# Patient Record
Sex: FEMALE | Race: BLACK OR AFRICAN AMERICAN | NOT HISPANIC OR LATINO | Employment: OTHER | URBAN - METROPOLITAN AREA
[De-identification: names, ages, dates, MRNs, and addresses within clinical notes are randomized per-mention and may not be internally consistent; named-entity substitution may affect disease eponyms.]

---

## 2017-01-05 RX ORDER — ZOLPIDEM TARTRATE 5 MG/1
TABLET ORAL
Qty: 21 TABLET | Refills: 0 | Status: SHIPPED | OUTPATIENT
Start: 2017-01-05 | End: 2017-05-15 | Stop reason: SDUPTHER

## 2017-01-16 ENCOUNTER — OFFICE VISIT (OUTPATIENT)
Dept: INTERNAL MEDICINE | Facility: CLINIC | Age: 62
End: 2017-01-16
Payer: COMMERCIAL

## 2017-01-16 VITALS
SYSTOLIC BLOOD PRESSURE: 132 MMHG | BODY MASS INDEX: 27.27 KG/M2 | WEIGHT: 153.88 LBS | TEMPERATURE: 96 F | DIASTOLIC BLOOD PRESSURE: 78 MMHG | HEART RATE: 69 BPM | HEIGHT: 63 IN

## 2017-01-16 DIAGNOSIS — I10 HTN (HYPERTENSION), BENIGN: ICD-10-CM

## 2017-01-16 DIAGNOSIS — L03.011 PARONYCHIA, RIGHT: ICD-10-CM

## 2017-01-16 DIAGNOSIS — M81.0 OSTEOPOROSIS: Primary | ICD-10-CM

## 2017-01-16 DIAGNOSIS — D64.9 ANEMIA, UNSPECIFIED TYPE: ICD-10-CM

## 2017-01-16 PROCEDURE — 3075F SYST BP GE 130 - 139MM HG: CPT | Mod: S$GLB,,, | Performed by: FAMILY MEDICINE

## 2017-01-16 PROCEDURE — 99999 PR PBB SHADOW E&M-EST. PATIENT-LVL II: CPT | Mod: PBBFAC,,, | Performed by: FAMILY MEDICINE

## 2017-01-16 PROCEDURE — 99214 OFFICE O/P EST MOD 30 MIN: CPT | Mod: S$GLB,,, | Performed by: FAMILY MEDICINE

## 2017-01-16 PROCEDURE — 1159F MED LIST DOCD IN RCRD: CPT | Mod: S$GLB,,, | Performed by: FAMILY MEDICINE

## 2017-01-16 PROCEDURE — 3078F DIAST BP <80 MM HG: CPT | Mod: S$GLB,,, | Performed by: FAMILY MEDICINE

## 2017-01-16 RX ORDER — CLINDAMYCIN HYDROCHLORIDE 150 MG/1
150 CAPSULE ORAL 3 TIMES DAILY
Qty: 21 CAPSULE | Refills: 0 | Status: SHIPPED | OUTPATIENT
Start: 2017-01-16 | End: 2017-01-23

## 2017-01-16 RX ORDER — ALENDRONATE SODIUM 70 MG/1
TABLET ORAL
Qty: 12 TABLET | Refills: 3 | Status: SHIPPED | OUTPATIENT
Start: 2017-01-16 | End: 2018-02-06 | Stop reason: SDUPTHER

## 2017-01-16 NOTE — PROGRESS NOTES
Subjective:       Patient ID: Kacy Blackwell is a 61 y.o. female.    Chief Complaint: Multiple issues see below    HPIr 1st toe nicked while cutting nails and swelling pain since. No fever  osteopor d/wd resuming fosmax. She remembers dosing /side eff. No intol  intermitt mild anemia    Some stress taking care 95 y/o mom    ,.  Past Medical History   Diagnosis Date    Chronic cough      controlled w flonase/zyrtec    Hyperaldosteronism      dr cummings    Hyperlipidemia     Hypertension     Hypokalemia     Osteoporosis 12/16 naeem 12/18    Seasonal allergies      Past Surgical History   Procedure Laterality Date    Hysterectomy  1980     cervix absent    Cyst removed from head       Family History   Problem Relation Age of Onset    Hypertension Mother     Arthritis Mother     Diabetes Mother     Heart failure Mother     Hypertension Father     Arthritis Father     Cancer Sister     Hypertension Brother     Diabetes Brother     Stroke Brother     Hypertension Sister     Breast cancer Neg Hx     Colon cancer Neg Hx     Ovarian cancer Neg Hx      Social History     Social History    Marital status:      Spouse name: N/A    Number of children: N/A    Years of education: N/A     Social History Main Topics    Smoking status: Never Smoker    Smokeless tobacco: Never Used    Alcohol use Yes      Comment: rare social drinker    Drug use: No    Sexual activity: Yes     Partners: Male     Other Topics Concern    None     Social History Narrative    . 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    Pulmonary/Chest: Effort normal and breath sounds normal.     no carot bruit    r 1st toe eith mild swelling tend paronych  area  Assessment:       1. Osteoporosis    2. HTN (hypertension), benign    3. Anemia, unspecified type      paronychia   Plan:       **If no better 3-4 days followup sooner if any worsening or change in symptoms or lack of resolution  Has [facility] visit May  *  fosmax rx  Declines flu shot

## 2017-03-06 RX ORDER — TRIAMTERENE 100 MG/1
CAPSULE ORAL
Qty: 30 CAPSULE | Refills: 7 | OUTPATIENT
Start: 2017-03-06

## 2017-03-07 RX ORDER — TRIAMTERENE 100 MG/1
CAPSULE ORAL
Qty: 30 CAPSULE | Refills: 7 | OUTPATIENT
Start: 2017-03-07

## 2017-03-09 RX ORDER — TRIAMTERENE 100 MG/1
CAPSULE ORAL
Qty: 30 CAPSULE | Refills: 7 | OUTPATIENT
Start: 2017-03-09

## 2017-03-14 ENCOUNTER — PATIENT MESSAGE (OUTPATIENT)
Dept: NEPHROLOGY | Facility: CLINIC | Age: 62
End: 2017-03-14

## 2017-03-14 ENCOUNTER — PATIENT MESSAGE (OUTPATIENT)
Dept: INTERNAL MEDICINE | Facility: CLINIC | Age: 62
End: 2017-03-14

## 2017-03-14 RX ORDER — TRIAMTERENE CAPSULES 100 MG/1
100 CAPSULE ORAL DAILY
Qty: 30 CAPSULE | Refills: 11 | OUTPATIENT
Start: 2017-03-14

## 2017-03-15 ENCOUNTER — PATIENT MESSAGE (OUTPATIENT)
Dept: NEPHROLOGY | Facility: CLINIC | Age: 62
End: 2017-03-15

## 2017-03-15 DIAGNOSIS — I10 ESSENTIAL HYPERTENSION: Primary | ICD-10-CM

## 2017-03-16 ENCOUNTER — PATIENT MESSAGE (OUTPATIENT)
Dept: NEPHROLOGY | Facility: CLINIC | Age: 62
End: 2017-03-16

## 2017-03-16 RX ORDER — TRIAMTERENE CAPSULES 100 MG/1
100 CAPSULE ORAL DAILY
Qty: 30 CAPSULE | Refills: 8 | OUTPATIENT
Start: 2017-03-16

## 2017-03-20 ENCOUNTER — LAB VISIT (OUTPATIENT)
Dept: LAB | Facility: HOSPITAL | Age: 62
End: 2017-03-20
Attending: INTERNAL MEDICINE
Payer: COMMERCIAL

## 2017-03-20 ENCOUNTER — OFFICE VISIT (OUTPATIENT)
Dept: NEPHROLOGY | Facility: CLINIC | Age: 62
End: 2017-03-20
Payer: COMMERCIAL

## 2017-03-20 VITALS
WEIGHT: 158.75 LBS | DIASTOLIC BLOOD PRESSURE: 86 MMHG | HEIGHT: 63 IN | SYSTOLIC BLOOD PRESSURE: 146 MMHG | HEART RATE: 64 BPM | BODY MASS INDEX: 28.13 KG/M2

## 2017-03-20 DIAGNOSIS — I10 HTN (HYPERTENSION), BENIGN: Primary | ICD-10-CM

## 2017-03-20 DIAGNOSIS — I10 ESSENTIAL HYPERTENSION: ICD-10-CM

## 2017-03-20 LAB
BILIRUB UR QL STRIP: NEGATIVE
CLARITY UR: CLEAR
COLOR UR: YELLOW
CREAT UR-MCNC: 108 MG/DL
GLUCOSE UR QL STRIP: NEGATIVE
HGB UR QL STRIP: ABNORMAL
KETONES UR QL STRIP: NEGATIVE
LEUKOCYTE ESTERASE UR QL STRIP: NEGATIVE
NITRITE UR QL STRIP: NEGATIVE
PH UR STRIP: 6 [PH] (ref 5–8)
PROT UR QL STRIP: NEGATIVE
PROT UR-MCNC: 7 MG/DL
PROT/CREAT RATIO, UR: 0.06
SP GR UR STRIP: 1.02 (ref 1–1.03)
URN SPEC COLLECT METH UR: ABNORMAL

## 2017-03-20 PROCEDURE — 1160F RVW MEDS BY RX/DR IN RCRD: CPT | Mod: S$GLB,,, | Performed by: INTERNAL MEDICINE

## 2017-03-20 PROCEDURE — 99999 PR PBB SHADOW E&M-EST. PATIENT-LVL III: CPT | Mod: PBBFAC,,, | Performed by: INTERNAL MEDICINE

## 2017-03-20 PROCEDURE — 99204 OFFICE O/P NEW MOD 45 MIN: CPT | Mod: S$GLB,,, | Performed by: INTERNAL MEDICINE

## 2017-03-20 PROCEDURE — 82570 ASSAY OF URINE CREATININE: CPT

## 2017-03-20 PROCEDURE — 3077F SYST BP >= 140 MM HG: CPT | Mod: S$GLB,,, | Performed by: INTERNAL MEDICINE

## 2017-03-20 PROCEDURE — 81003 URINALYSIS AUTO W/O SCOPE: CPT | Mod: PO

## 2017-03-20 PROCEDURE — 3079F DIAST BP 80-89 MM HG: CPT | Mod: S$GLB,,, | Performed by: INTERNAL MEDICINE

## 2017-03-20 RX ORDER — TRIAMTERENE CAPSULES 100 MG/1
100 CAPSULE ORAL DAILY
Qty: 90 CAPSULE | Refills: 3 | Status: SHIPPED | OUTPATIENT
Start: 2017-03-20 | End: 2018-04-06 | Stop reason: SDUPTHER

## 2017-03-20 NOTE — PATIENT INSTRUCTIONS
Avoid NSAID pain medications such as advil, aleve, motrin, ibuprofen, naprosyn, meloxicam, diclofenac, mobic.     Drink 60 - 70 oz of fluids a day     Low salt diet     Target BP less than 140/90     keep a track of daily BPs and bring at next appt    bring all meds in a bag at next appt

## 2017-03-20 NOTE — MR AVS SNAPSHOT
[facility] - Nephrology  [address] [facility] [place] [address]  Phone: [phone]  Fax: [phone]                  Kacy Blackwell   3/20/2017 8:30 AM   Office Visit    Description:  Female : 1955   Provider:  Cliff Barrera MD   Department:  [facility] - Nephrology           Reason for Visit     Follow-up           Diagnoses this Visit        Comments    HTN (hypertension), benign    -  Primary            To Do List           Future Appointments        Provider Department Dept Phone    2017 11:00 AM LABORATORY, [facility] [phone]    2017 11:10 AM SPECIMEN, [facility] [phone]    2017 11:00 AM Cliff Barrera MD [facility] Nephrology [phone]    2017 11:00 AM Gurinder Pierce MD [facility] Internal Medicine [phone]      Goals (5 Years of Data)     None      Follow-Up and Disposition     Return in about 3 months (around 2017).       These Medications        Disp Refills Start End    triamterene (DYRENIUM) 100 MG Cap 90 capsule 3 3/20/2017     Take 1 capsule (100 mg total) by mouth once daily. - Oral    [facility]: [id number] [address] #: [phone]         [facility]     [facility] Nurse Care [facility] -  Assistance  Registered nurses in the [facility] provide clinical advisement, health education, appointment booking, and other advisory services.  Call for this free service at [phone].             Medications           Message regarding Medications     Verify the changes and/or additions to your medication regime listed below are the same as discussed with your clinician today.  If any of these changes or additions are incorrect, please notify your healthcare provider.        CHANGE how you are taking these medications     Start Taking Instead of    triamterene (DYRENIUM) 100 MG Cap DYRENIUM 100 mg Cap    Dosage:  Take 1 capsule (100 mg total)  "by mouth once daily. Dosage:  TAKE ONE CAPSULE BY MOUTH EVERY DAY    Reason for Change:  Reorder       STOP taking these medications     scopolamine (TRANSDERM-SCOP) 1.5 mg (1 mg over 3 days) Place 1 patch (1.5 mg total) onto the skin Every 3 (three) days.           Verify that the below list of medications is an accurate representation of the medications you are currently taking.  If none reported, the list may be blank. If incorrect, please contact your healthcare provider. Carry this list with you in case of emergency.           Current Medications     alendronate (FOSAMAX) 70 MG tablet Take 1 tablet once weekly in the morning with a full glass of water on an empty stomach. Do not lie down for at least 30 minutes afterwards.    aspirin 81 MG Chew Take 81 mg by mouth once daily.    BIOTIN ORAL Take 10,000 mg by mouth once daily.     calcium carbonate-vit D3-min (CALTRATE 600+D PLUS MINERALS) 600 mg calcium- 400 unit Tab Take 1 tablet by mouth once daily.    CETIRIZINE HCL (ZYRTEC ORAL) Take by mouth.    CRESTOR 5 mg tablet TAKE ONE TABLET BY MOUTH AT BEDTIME    metoprolol succinate (TOPROL-XL) 50 MG 24 hr tablet TAKE ONE TABLET BY MOUTH EVERY DAY    multivitamin capsule Take 1 capsule by mouth once daily.    peg 400-propylene glycol, PF, (SYSTANE, PF,) 0.4-0.3 % Dpet Apply to eye.    potassium chloride SA (K-DUR,KLOR-CON) 20 MEQ tablet Take 1 tablet (20 mEq total) by mouth 2 (two) times daily.    triamterene (DYRENIUM) 100 MG Cap Take 1 capsule (100 mg total) by mouth once daily.    zolpidem (AMBIEN) 5 MG Tab TAKE ONE TABLET BY MOUTH NIGHTLY AS NEEDED. AVOID WITH ALPRAZOLAM.           Clinical Reference Information           Your Vitals Were     BP Pulse Height Weight BMI    146/86 64 5' 3" (1.6 m) 72 kg (158 lb 11.7 oz) 28.12 kg/m2      Blood Pressure          Most Recent Value    BP  (!)  146/86      Allergies as of 3/20/2017     Diovan [Valsartan]      Immunizations Administered on Date of Encounter - 3/20/2017 "     None      Orders Placed During Today's Visit     Future Labs/Procedures Expected by Expires    Aldosterone  3/20/2017 5/19/2018    CBC auto differential  3/20/2017 5/19/2018    Renal function panel  3/20/2017 5/19/2018    Renin  3/20/2017 5/19/2018    Urinalysis  3/20/2017 3/20/2018      Instructions    Avoid NSAID pain medications such as advil, aleve, motrin, ibuprofen, naprosyn, meloxicam, diclofenac, mobic.     Drink 60 - 70 oz of fluids a day     Low salt diet     Target BP less than 140/90     keep a track of daily BPs and bring at next appt    bring all meds in a bag at next appt            Language Assistance Services     ATTENTION: Language assistance services are available, free of charge. Please call [phone].      ATENCIÓN: Si habla luis m, tiene a nunes disposición servicios gratuitos de asistencia lingüística. Llame al [phone].     CHÚ Ý: N?u b?n nói Ti?ng Vi?t, có các d?ch v? h? tr? ngôn ng? mi?n phí dành cho b?n. G?i s? [phone].         [facility] - Nephrology complies with applicable Federal civil rights laws and does not discriminate on the basis of race, color, national origin, age, disability, or sex.

## 2017-03-20 NOTE — PROGRESS NOTES
Subjective:       Patient ID: Kacy Blackwell is a 61 y.o.   female who presents for new evaluation of Hypokalemia, Hypertension ,     Gurinder Pierce MD     HPI: Ms Kacy Blackwell is a 61-year-old AA woman seen in office today in consultation for above medical problems on referral from primary M.D.  Pertinent provider notes in the interim were reviewed.  I saw the patient in the clinic more than 3 years ago and she was lost to follow-up.  Patient has chronic hypokalemia dating back to 2009. She was stareted on potassium supplements. Despite the supplements her potassium levels have been running on the low side. Her hypokalemia is secondary to increased urinary losses. Serum renin and aldosterone levels were elevated acc to previous labs . CAT scan of the abdomen showed normal adrenal glands. Patient was initially started on Aldactone. Later  switched to triamterene secondary to concerns of side effects of aldactone, she has been tolerating Triamterene well, not very consistent with low salt diet,             Past Medical History:   Diagnosis Date    Chronic cough     controlled w flonase/zyrtec    Hyperaldosteronism     dr cummings    Hyperlipidemia     Hypertension     Hypokalemia     Osteoporosis 12/16 naeem 12/18    Seasonal allergies        Past Surgical History:   Procedure Laterality Date    cyst removed from head      HYSTERECTOMY  1980    cervix absent           Current Outpatient Prescriptions on File Prior to Visit   Medication Sig Dispense Refill    alendronate (FOSAMAX) 70 MG tablet Take 1 tablet once weekly in the morning with a full glass of water on an empty stomach. Do not lie down for at least 30 minutes afterwards. 12 tablet 3    aspirin 81 MG Chew Take 81 mg by mouth once daily.      BIOTIN ORAL Take 10,000 mg by mouth once daily.       calcium carbonate-vit D3-min (CALTRATE 600+D PLUS MINERALS) 600 mg calcium- 400 unit Tab Take 1 tablet by mouth once daily.       CETIRIZINE HCL (ZYRTEC ORAL) Take by mouth.      CRESTOR 5 mg tablet TAKE ONE TABLET BY MOUTH AT BEDTIME 30 tablet 10    DYRENIUM 100 mg Cap TAKE ONE CAPSULE BY MOUTH EVERY DAY 30 capsule 8    metoprolol succinate (TOPROL-XL) 50 MG 24 hr tablet TAKE ONE TABLET BY MOUTH EVERY DAY 90 tablet 3    multivitamin capsule Take 1 capsule by mouth once daily.      peg 400-propylene glycol, PF, (SYSTANE, PF,) 0.4-0.3 % Dpet Apply to eye.      potassium chloride SA (K-DUR,KLOR-CON) 20 MEQ tablet Take 1 tablet (20 mEq total) by mouth 2 (two) times daily. 60 tablet 5    zolpidem (AMBIEN) 5 MG Tab TAKE ONE TABLET BY MOUTH NIGHTLY AS NEEDED. AVOID WITH ALPRAZOLAM. 21 tablet 0    [DISCONTINUED] scopolamine (TRANSDERM-SCOP) 1.5 mg (1 mg over 3 days) Place 1 patch (1.5 mg total) onto the skin Every 3 (three) days. 1 patch 1     No current facility-administered medications on file prior to visit.        Review of patient's allergies indicates:   Allergen Reactions    Diovan [valsartan]      angioedema       FH : no FH of kidney disease,     SH : she is a , lives at home , has a son         Review of Systems  :      Constitutional: Negative for fever, activity change, appetite change and fatigue.   HENT: Negative for congestion, sore throat, facial swelling, trouble swallowing and voice change.   Eyes: Negative for redness and visual disturbance.   Respiratory: Negative for apnea, cough, chest tightness, shortness of breath and wheezing.   Cardiovascular: Negative for chest pain, palpitations and leg swelling.   Gastrointestinal: Negative for nausea, vomiting, abdominal pain, diarrhea, constipation, blood in stool and abdominal distention.   Genitourinary: Negative for dysuria, urgency, frequency, hematuria, flank pain, decreased urine volume, difficulty urinating and pelvic pain.   Musculoskeletal: Negative for back pain, joint swelling and gait problem.   Skin: Negative for color change and rash.   Neurological:  Negative for dizziness, syncope, weakness and headaches.   Hematological: Does not bruise/bleed easily.   Psychiatric/Behavioral: Negative for behavioral problems, confusion and agitation. The patient is not nervous/anxious.       Objective:         Vitals:    03/20/17 0825   BP: (!) 146/86   Pulse: 64       Respiratory rate 20, afebrile, weight 158 pounds    Physical Exam :    Constitutional: She is oriented to person, place, and time. She appears well-developed and well-nourished. No distress.   HENT: Head: Normocephalic and atraumatic.   Mouth/Throat: Oropharynx is clear and moist. No oropharyngeal exudate.   Eyes: Conjunctivae normal and EOM are normal. Pupils are equal, round, and reactive to light. No scleral icterus.   Neck: Normal range of motion. Neck supple. No JVD present. Carotid bruit is not present. No tracheal deviation present. No mass and no thyromegaly present.   Cardiovascular: Normal rate, regular rhythm, normal heart sounds and intact distal pulses. Exam reveals no gallop and no friction rub.   No murmur heard.  Pulmonary/Chest: Effort normal and breath sounds normal. No respiratory distress. She has no wheezes. She has no rales. She exhibits no tenderness.   Abdominal: Soft. Bowel sounds are normal. She exhibits no distension, no abdominal bruit, no ascites and no mass. There is no hepatosplenomegaly. There is no tenderness. There is no rebound, no guarding and no CVA tenderness.   Musculoskeletal: Normal range of motion. She exhibits no edema and no tenderness.   Lymphadenopathy: She has no cervical adenopathy.   Neurological: She is alert and oriented to person, place, and time. She has normal reflexes. No cranial nerve deficit. She exhibits normal muscle tone. Coordination normal.   Skin: Skin is warm and intact. No rash noted. No erythema. No pallor.   Psychiatric: She has a normal mood and affect. Her behavior is normal. Judgment normal.                Labs:    Lab Results   Component  Value Date    CREATININE 1.1 03/20/2017    BUN 26 (H) 03/20/2017     03/20/2017    K 3.5 03/20/2017     03/20/2017    CO2 25 03/20/2017         Lab Results   Component Value Date    WBC 5.67 03/20/2017    HGB 11.9 (L) 03/20/2017    HCT 37.2 03/20/2017    MCV 93 03/20/2017     03/20/2017       Lab Results   Component Value Date    CALCIUM 9.7 05/24/2016    PHOS 2.7 09/11/2013       Lab Results   Component Value Date    ALBUMIN 3.7 03/20/2017           61 -year-old  woman seen office today in consultation for following medical problems      1. Hypokalemia - from primary hyperaldosteronism. CAT scan of the abdomen shows normal adrenal glands. potassium levels are stable with current medications. Continue triamtrene 100 mg daily. Also she is on potassium chloride 20 mEq twice a day.  Continue current medications.  We will continue to monitor.     2. Hypertension - her blood pressure is slightly elevated today, patient is out of her triamterene.  Refill on the medication given today.  Advised patient to monitor her blood pressures at home and bring them at next visit.  Again discussed low-salt diet.     3. Stable renal fn with a normal serum creatinine    4.  Recent urinalysis shows normal range proteinuria.     We'll see her in followup in about 3 months.  Thank you for involving us in the care of this pleasant woman.  Total time spent 50 minutes including time needed to review the records,  patient  evaluation, documentation, face-to-face discussion with the patient,    more than 50% of the time was spent on coordination of care and counseling.        Sincerely,     Cliff Barrera M.D.    CC to Dr Pierce

## 2017-03-31 DIAGNOSIS — Z00.00 ROUTINE GENERAL MEDICAL EXAMINATION AT A HEALTH CARE FACILITY: Primary | ICD-10-CM

## 2017-05-09 RX ORDER — POTASSIUM CHLORIDE 20 MEQ/1
TABLET, EXTENDED RELEASE ORAL
Qty: 60 TABLET | Refills: 4 | Status: SHIPPED | OUTPATIENT
Start: 2017-05-09 | End: 2017-10-11 | Stop reason: SDUPTHER

## 2017-05-15 ENCOUNTER — CLINICAL SUPPORT (OUTPATIENT)
Dept: INTERNAL MEDICINE | Facility: CLINIC | Age: 62
End: 2017-05-15

## 2017-05-15 ENCOUNTER — CLINICAL SUPPORT (OUTPATIENT)
Dept: CARDIOLOGY | Facility: CLINIC | Age: 62
End: 2017-05-15
Payer: COMMERCIAL

## 2017-05-15 ENCOUNTER — HOSPITAL ENCOUNTER (OUTPATIENT)
Dept: RADIOLOGY | Facility: HOSPITAL | Age: 62
Discharge: HOME | End: 2017-05-15
Attending: FAMILY MEDICINE
Payer: COMMERCIAL

## 2017-05-15 ENCOUNTER — OFFICE VISIT (OUTPATIENT)
Dept: INTERNAL MEDICINE | Facility: CLINIC | Age: 62
End: 2017-05-15
Payer: COMMERCIAL

## 2017-05-15 VITALS
HEIGHT: 63 IN | BODY MASS INDEX: 27.34 KG/M2 | RESPIRATION RATE: 16 BRPM | SYSTOLIC BLOOD PRESSURE: 138 MMHG | DIASTOLIC BLOOD PRESSURE: 78 MMHG | WEIGHT: 154.31 LBS | HEART RATE: 72 BPM

## 2017-05-15 VITALS
HEART RATE: 72 BPM | HEIGHT: 63 IN | TEMPERATURE: 98 F | BODY MASS INDEX: 27.34 KG/M2 | SYSTOLIC BLOOD PRESSURE: 138 MMHG | WEIGHT: 154.31 LBS | DIASTOLIC BLOOD PRESSURE: 78 MMHG

## 2017-05-15 DIAGNOSIS — Z00.00 ROUTINE GENERAL MEDICAL EXAMINATION AT A HEALTH CARE FACILITY: ICD-10-CM

## 2017-05-15 DIAGNOSIS — Z00.00 ROUTINE HEALTH MAINTENANCE: Primary | ICD-10-CM

## 2017-05-15 DIAGNOSIS — Z00.00 ROUTINE GENERAL MEDICAL EXAMINATION AT A HEALTH CARE FACILITY: Primary | ICD-10-CM

## 2017-05-15 DIAGNOSIS — I10 HTN (HYPERTENSION), BENIGN: ICD-10-CM

## 2017-05-15 LAB
ALBUMIN SERPL BCP-MCNC: 3.8 G/DL
ALP SERPL-CCNC: 66 U/L
ALT SERPL W/O P-5'-P-CCNC: 25 U/L
ANION GAP SERPL CALC-SCNC: 12 MMOL/L
AST SERPL-CCNC: 25 U/L
BILIRUB SERPL-MCNC: 0.5 MG/DL
BILIRUB UR QL STRIP: NEGATIVE
BUN SERPL-MCNC: 26 MG/DL
CALCIUM SERPL-MCNC: 9.6 MG/DL
CHLORIDE SERPL-SCNC: 109 MMOL/L
CHOLEST/HDLC SERPL: 2.8 {RATIO}
CLARITY UR: CLEAR
CO2 SERPL-SCNC: 23 MMOL/L
COLOR UR: YELLOW
CREAT SERPL-MCNC: 1.4 MG/DL
ERYTHROCYTE [DISTWIDTH] IN BLOOD BY AUTOMATED COUNT: 13.8 %
EST. GFR  (AFRICAN AMERICAN): 46 ML/MIN/1.73 M^2
EST. GFR  (NON AFRICAN AMERICAN): 40 ML/MIN/1.73 M^2
GLUCOSE SERPL-MCNC: 95 MG/DL
GLUCOSE UR QL STRIP: NEGATIVE
GRAN CASTS #/AREA URNS LPF: 2 /LPF
HCT VFR BLD AUTO: 37.6 %
HDL/CHOLESTEROL RATIO: 35.3 %
HDLC SERPL-MCNC: 156 MG/DL
HDLC SERPL-MCNC: 55 MG/DL
HGB BLD-MCNC: 11.8 G/DL
HGB UR QL STRIP: NEGATIVE
KETONES UR QL STRIP: NEGATIVE
LDLC SERPL CALC-MCNC: 81.6 MG/DL
LEUKOCYTE ESTERASE UR QL STRIP: ABNORMAL
MCH RBC QN AUTO: 28.9 PG
MCHC RBC AUTO-ENTMCNC: 31.4 %
MCV RBC AUTO: 92 FL
MICROSCOPIC COMMENT: ABNORMAL
NITRITE UR QL STRIP: NEGATIVE
NONHDLC SERPL-MCNC: 101 MG/DL
PH UR STRIP: 6 [PH] (ref 5–8)
PLATELET # BLD AUTO: 243 K/UL
PMV BLD AUTO: 10.5 FL
POTASSIUM SERPL-SCNC: 4.1 MMOL/L
PROT SERPL-MCNC: 6.8 G/DL
PROT UR QL STRIP: NEGATIVE
RBC # BLD AUTO: 4.08 M/UL
SODIUM SERPL-SCNC: 144 MMOL/L
SP GR UR STRIP: 1.02 (ref 1–1.03)
TRIGL SERPL-MCNC: 97 MG/DL
URN SPEC COLLECT METH UR: ABNORMAL
WBC # BLD AUTO: 5.77 K/UL
WBC #/AREA URNS HPF: 1 /HPF (ref 0–5)

## 2017-05-15 PROCEDURE — 80053 COMPREHEN METABOLIC PANEL: CPT | Mod: PO

## 2017-05-15 PROCEDURE — 93000 ELECTROCARDIOGRAM COMPLETE: CPT | Mod: S$GLB,,, | Performed by: NUCLEAR MEDICINE

## 2017-05-15 PROCEDURE — 99999 PR PBB SHADOW E&M-EST. PATIENT-LVL III: CPT | Mod: PBBFAC,,, | Performed by: FAMILY MEDICINE

## 2017-05-15 PROCEDURE — 80061 LIPID PANEL: CPT | Mod: PO

## 2017-05-15 PROCEDURE — 3075F SYST BP GE 130 - 139MM HG: CPT | Mod: S$GLB,,, | Performed by: FAMILY MEDICINE

## 2017-05-15 PROCEDURE — 99396 PREV VISIT EST AGE 40-64: CPT | Mod: S$GLB,,, | Performed by: FAMILY MEDICINE

## 2017-05-15 PROCEDURE — 85027 COMPLETE CBC AUTOMATED: CPT | Mod: PO

## 2017-05-15 PROCEDURE — 71020 XR CHEST PA AND LATERAL: CPT | Mod: 26,,, | Performed by: RADIOLOGY

## 2017-05-15 PROCEDURE — 71020 XR CHEST PA AND LATERAL: CPT | Mod: TC,PO

## 2017-05-15 PROCEDURE — 81000 URINALYSIS NONAUTO W/SCOPE: CPT | Mod: PO

## 2017-05-15 PROCEDURE — 3078F DIAST BP <80 MM HG: CPT | Mod: S$GLB,,, | Performed by: FAMILY MEDICINE

## 2017-05-15 RX ORDER — ZOLPIDEM TARTRATE 5 MG/1
5 TABLET ORAL NIGHTLY PRN
Qty: 30 TABLET | Refills: 0 | Status: SHIPPED | OUTPATIENT
Start: 2017-05-15 | End: 2017-10-31 | Stop reason: SDUPTHER

## 2017-05-15 NOTE — MR AVS SNAPSHOT
[facility] Internal Medicine  [address] [facility] [address] [place] [address]  Phone: [phone]  Fax: [phone]                  Kacy Blackwell   5/15/2017 9:00 AM   Office Visit    Description:  Female : 1955   Provider:  Gurinder Pierce MD   Department:  [facility] - Internal Medicine           Diagnoses this Visit        Comments    Routine health maintenance    -  Primary     HTN (hypertension), benign                To Do List           Future Appointments        Provider Department Dept Phone    5/15/2017 10:30 AM [facility] [phone]    2017 11:00 AM LABORATORY, [facility] [phone]    2017 11:10 AM SPECIMEN, [facility] [phone]    2017 11:00 AM Cliff Barrera MD [facility] Nephrology [phone]    2017 11:20 AM Gurinder Pierce MD [facility] Internal Medicine [phone]      Goals (5 Years of Data)     None       These Medications        Disp Refills Start End    zolpidem (AMBIEN) 5 MG Tab 30 tablet 0 5/15/2017     Take 1 tablet (5 mg total) by mouth nightly as needed. - Oral    [facility]: [id number] [address] Ph #: [phone]         [facility] On Call     [facility] On [facility] Nurse Care Line -  Assistance  Unless otherwise directed by your provider, please contact [facility], our nurse care line that is available for / assistance.     Registered nurses in the [facility] provide: appointment scheduling, clinical advisement, health education, and other advisory services.  Call: [phone] (toll free)               Medications           Message regarding Medications     Verify the changes and/or additions to your medication regime listed below are the same as discussed with your clinician today.  If any of these changes or additions are incorrect, please notify your healthcare provider.        CHANGE how you are taking these medications  "    Start Taking Instead of    zolpidem (AMBIEN) 5 MG Tab zolpidem (AMBIEN) 5 MG Tab    Dosage:  Take 1 tablet (5 mg total) by mouth nightly as needed. Dosage:  TAKE ONE TABLET BY MOUTH NIGHTLY AS NEEDED. AVOID WITH ALPRAZOLAM.    Reason for Change:  Reorder            Verify that the below list of medications is an accurate representation of the medications you are currently taking.  If none reported, the list may be blank. If incorrect, please contact your healthcare provider. Carry this list with you in case of emergency.           Current Medications     alendronate (FOSAMAX) 70 MG tablet Take 1 tablet once weekly in the morning with a full glass of water on an empty stomach. Do not lie down for at least 30 minutes afterwards.    aspirin 81 MG Chew Take 81 mg by mouth once daily.    BIOTIN ORAL Take 10,000 mg by mouth once daily.     calcium carbonate-vit D3-min (CALTRATE 600+D PLUS MINERALS) 600 mg calcium- 400 unit Tab Take 1 tablet by mouth once daily.    CETIRIZINE HCL (ZYRTEC ORAL) Take by mouth.    CRESTOR 5 mg tablet TAKE ONE TABLET BY MOUTH AT BEDTIME    metoprolol succinate (TOPROL-XL) 50 MG 24 hr tablet TAKE ONE TABLET BY MOUTH EVERY DAY    multivitamin capsule Take 1 capsule by mouth once daily.    peg 400-propylene glycol, PF, (SYSTANE, PF,) 0.4-0.3 % Dpet Apply to eye.    potassium chloride SA (K-DUR,KLOR-CON) 20 MEQ tablet TAKE ONE TABLET BY MOUTH TWICE DAILY    triamterene (DYRENIUM) 100 MG Cap Take 1 capsule (100 mg total) by mouth once daily.    zolpidem (AMBIEN) 5 MG Tab Take 1 tablet (5 mg total) by mouth nightly as needed.           Clinical Reference Information           Your Vitals Were     BP Pulse Temp Height Weight BMI    138/78 72 97.7 °F (36.5 °C) (Tympanic) 5' 3" (1.6 m) 70 kg (154 lb 5.2 oz) 27.34 kg/m2      Blood Pressure          Most Recent Value    BP  138/78      Allergies as of 5/15/2017     Diovan [Valsartan]      Immunizations Administered on Date of Encounter - 5/15/2017  "    None      Orders Placed During Today's Visit     Future Labs/Procedures Expected by Expires    X-Ray Chest PA And Lateral  5/15/2017 5/15/2018      Language Assistance Services     ATTENTION: Language assistance services are available, free of charge. Please call [phone].      ATENCIÓN: Si colin asencio, tiene a nunes disposición servicios gratuitos de asistencia lingüística. Llame al [phone].     CHÚ Ý: N?u b?n nói Ti?ng Vi?t, có các d?ch v? h? tr? ngôn ng? mi?n phí dành cho b?n. G?i s? [phone].         [facility] - Internal Medicine complies with applicable Federal civil rights laws and does not discriminate on the basis of race, color, national origin, age, disability, or sex.

## 2017-05-15 NOTE — PROGRESS NOTES
Subjective:       Patient ID: Kacy Blackwell is a 61 y.o. female.    Chief Complaint: exec phys  HPIhere for exec phys; only concern is about once per week needing to take deep breath. No piedra cp. See prior note re: stress w mother and her son; mood ok. infreq ambien need; at least a yr ago olol tmst and holter for occas sensation palpit no recurr  Hypertension: blood pressures at home normal. Tolerating medicine.   osteopor shad fosamax    Past Medical History:   Diagnosis Date    Chronic cough     controlled w flonase/zyrtec    Hyperaldosteronism     dr cummings    Hyperlipidemia     Hypertension     Hypokalemia     Osteoporosis 12/16 naeem 12/18    Seasonal allergies      Past Surgical History:   Procedure Laterality Date    cyst removed from head      HYSTERECTOMY  1980    cervix absent     Family History   Problem Relation Age of Onset    Hypertension Mother     Arthritis Mother     Diabetes Mother     Heart failure Mother     Hypertension Father     Arthritis Father     Cancer Sister     Hypertension Brother     Diabetes Brother     Stroke Brother     Hypertension Sister     Breast cancer Neg Hx     Colon cancer Neg Hx     Ovarian cancer Neg Hx      Social History     Social History    Marital status:      Spouse name: N/A    Number of children: N/A    Years of education: N/A     Social History Main Topics    Smoking status: Never Smoker    Smokeless tobacco: Never Used    Alcohol use Yes      Comment: rare social drinker    Drug use: No    Sexual activity: Yes     Partners: Male     Other Topics Concern    None     Social History Narrative    . 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 She appears well-developed and well-nourished. No distress.   HENT:   Head: Atraumatic.   Right Ear: External ear normal.   Left Ear: External ear normal.   Nose: Nose normal.   Mouth/Throat: Oropharynx is clear and moist. No oropharyngeal exudate.   bilat tms nl   Eyes: Conjunctivae and EOM are normal. Pupils are equal, round, and reactive to light. No scleral icterus.   Neck: Normal range of motion. Neck supple. No thyromegaly present.   Cardiovascular: Normal rate, regular rhythm and normal heart sounds.    No murmur heard.  Pulmonary/Chest: Effort normal and breath sounds normal. No respiratory distress. She has no wheezes. She has no rales.   Abdominal: Soft. Bowel sounds are normal. She exhibits no distension and no mass. There is no hepatosplenomegaly. There is no tenderness. There is no rebound and no guarding.   Musculoskeletal: Normal range of motion. She exhibits no edema or tenderness.   Lymphadenopathy:     She has no cervical adenopathy.   Neurological: She is alert and oriented to person, place, and time. No cranial nerve deficit. She exhibits normal muscle tone. Coordination normal.   Skin: Skin is warm. No rash noted. No erythema. No pallor.   Psychiatric: She has a normal mood and affect. Her behavior is normal. Judgment and thought content normal.   Nursing note and vitals reviewed.      Assessment:       1. Routine health maintenance    2. HTN (hypertension), benign        Plan:     f/u dr dick. When due  exerc more   D/c jly visit  F/u 6month  **naeem tmst when ready  reassur sympt likely related to stress but will further eval when ready  Routine health maintenance    HTN (hypertension), benign  -     X-Ray Chest PA And Lateral; Future; Expected date: 5/15/17    Other orders  -     zolpidem (AMBIEN) 5 MG Tab; Take 1 tablet (5 mg total) by mouth nightly as needed.  Dispense: 30 tablet; Refill: 0    *  letter dict

## 2017-06-08 RX ORDER — ROSUVASTATIN CALCIUM 5 MG/1
TABLET, COATED ORAL
Qty: 30 TABLET | Refills: 9 | Status: SHIPPED | OUTPATIENT
Start: 2017-06-08 | End: 2018-04-25 | Stop reason: SDUPTHER

## 2017-06-26 ENCOUNTER — LAB VISIT (OUTPATIENT)
Dept: LAB | Facility: HOSPITAL | Age: 62
End: 2017-06-26
Attending: INTERNAL MEDICINE
Payer: COMMERCIAL

## 2017-06-26 DIAGNOSIS — I10 HTN (HYPERTENSION), BENIGN: ICD-10-CM

## 2017-06-26 LAB
ALBUMIN SERPL BCP-MCNC: 3.9 G/DL
ANION GAP SERPL CALC-SCNC: 8 MMOL/L
BASOPHILS # BLD AUTO: 0 K/UL
BASOPHILS NFR BLD: 0 %
BUN SERPL-MCNC: 18 MG/DL
CALCIUM SERPL-MCNC: 9.5 MG/DL
CHLORIDE SERPL-SCNC: 107 MMOL/L
CO2 SERPL-SCNC: 28 MMOL/L
CREAT SERPL-MCNC: 1.2 MG/DL
DIFFERENTIAL METHOD: ABNORMAL
EOSINOPHIL # BLD AUTO: 0.2 K/UL
EOSINOPHIL NFR BLD: 2.8 %
ERYTHROCYTE [DISTWIDTH] IN BLOOD BY AUTOMATED COUNT: 14.2 %
EST. GFR  (AFRICAN AMERICAN): 56 ML/MIN/1.73 M^2
EST. GFR  (NON AFRICAN AMERICAN): 48.6 ML/MIN/1.73 M^2
GLUCOSE SERPL-MCNC: 87 MG/DL
HCT VFR BLD AUTO: 36.7 %
HGB BLD-MCNC: 11.5 G/DL
LYMPHOCYTES # BLD AUTO: 1.7 K/UL
LYMPHOCYTES NFR BLD: 28.2 %
MCH RBC QN AUTO: 29 PG
MCHC RBC AUTO-ENTMCNC: 31.3 %
MCV RBC AUTO: 93 FL
MONOCYTES # BLD AUTO: 0.5 K/UL
MONOCYTES NFR BLD: 7.5 %
NEUTROPHILS # BLD AUTO: 3.7 K/UL
NEUTROPHILS NFR BLD: 61.2 %
PHOSPHATE SERPL-MCNC: 3 MG/DL
PLATELET # BLD AUTO: 277 K/UL
PMV BLD AUTO: 10.4 FL
POTASSIUM SERPL-SCNC: 3.9 MMOL/L
RBC # BLD AUTO: 3.96 M/UL
SODIUM SERPL-SCNC: 143 MMOL/L
WBC # BLD AUTO: 6.1 K/UL

## 2017-06-26 PROCEDURE — 82088 ASSAY OF ALDOSTERONE: CPT

## 2017-06-26 PROCEDURE — 36415 COLL VENOUS BLD VENIPUNCTURE: CPT | Mod: PO

## 2017-06-26 PROCEDURE — 84244 ASSAY OF RENIN: CPT

## 2017-06-26 PROCEDURE — 85025 COMPLETE CBC W/AUTO DIFF WBC: CPT

## 2017-06-26 PROCEDURE — 80069 RENAL FUNCTION PANEL: CPT

## 2017-06-28 LAB
ALDOST SERPL-MCNC: 54.2 NG/DL
RENIN PLAS-CCNC: 0.7 NG/ML/H

## 2017-06-30 ENCOUNTER — TELEPHONE (OUTPATIENT)
Dept: NEPHROLOGY | Facility: CLINIC | Age: 62
End: 2017-06-30

## 2017-06-30 DIAGNOSIS — E26.09 PRIMARY HYPERALDOSTERONISM: Primary | ICD-10-CM

## 2017-06-30 NOTE — PROGRESS NOTES
Reviewed recent labs, significantly elevated aldosterone level noted, will schedule appointment with endocrinology for further evaluation and recommendations.    Cliff Barrera MD

## 2017-07-12 ENCOUNTER — OFFICE VISIT (OUTPATIENT)
Dept: ENDOCRINOLOGY | Facility: CLINIC | Age: 62
End: 2017-07-12
Payer: COMMERCIAL

## 2017-07-12 VITALS
HEIGHT: 63 IN | SYSTOLIC BLOOD PRESSURE: 132 MMHG | BODY MASS INDEX: 26.88 KG/M2 | WEIGHT: 151.69 LBS | TEMPERATURE: 97 F | DIASTOLIC BLOOD PRESSURE: 74 MMHG | HEART RATE: 68 BPM

## 2017-07-12 DIAGNOSIS — E26.9 HYPERALDOSTERONISM: Primary | ICD-10-CM

## 2017-07-12 DIAGNOSIS — I10 HTN (HYPERTENSION), BENIGN: ICD-10-CM

## 2017-07-12 PROCEDURE — 99999 PR PBB SHADOW E&M-EST. PATIENT-LVL III: CPT | Mod: PBBFAC,,, | Performed by: INTERNAL MEDICINE

## 2017-07-12 PROCEDURE — 99244 OFF/OP CNSLTJ NEW/EST MOD 40: CPT | Mod: S$GLB,,, | Performed by: INTERNAL MEDICINE

## 2017-07-12 NOTE — PROGRESS NOTES
Subjective:       Patient ID: Kacy Blackwell is a 61 y.o. female.  Patient is new to me    Records were reviewed      Chief Complaint: Hypertension (hyperaldosteronism)      Consultation requested by Dr. Cliff Barrera    HPI     Hx of hypokalemia and hypertension since 2009. Initially seen by nephrology in 2013  Aldosterone at the time was 26 and PRA was suppressed and CT scan  Showed normal adrenal glands  She was on aldactone but requested to stop because of her concern she read about it causing tumors in animals so she was switched to triamterone and is on potassium  Recent labs show nl potassium and B/P has been controlled, also on metoprolol  Review of Systems   Constitutional: Negative for appetite change, fatigue, fever and unexpected weight change.   HENT: Negative for sore throat and trouble swallowing.    Eyes: Negative for visual disturbance.   Respiratory: Negative for shortness of breath and wheezing.    Cardiovascular: Negative for chest pain, palpitations and leg swelling.   Gastrointestinal: Negative for diarrhea, nausea and vomiting.   Endocrine: Negative for cold intolerance, heat intolerance, polydipsia, polyphagia and polyuria.   Genitourinary: Negative for difficulty urinating, dysuria and menstrual problem.   Musculoskeletal: Negative for arthralgias and joint swelling.   Skin: Negative for rash.        Hair thinning   Neurological: Negative for dizziness, weakness, numbness and headaches.   Psychiatric/Behavioral: Negative for confusion, dysphoric mood and sleep disturbance.       Objective:      Physical Exam   Constitutional: She is oriented to person, place, and time. She appears well-developed and well-nourished. No distress.   HENT:   Head: Normocephalic and atraumatic.   Right Ear: External ear normal.   Left Ear: External ear normal.   Nose: Nose normal.   Mouth/Throat: Oropharynx is clear and moist. No oropharyngeal exudate.   Eyes: Conjunctivae and EOM are normal. Pupils are  equal, round, and reactive to light. No scleral icterus.   Neck: No JVD present. No tracheal deviation present. No thyromegaly present.   Cardiovascular: Normal rate, regular rhythm, normal heart sounds and intact distal pulses.  Exam reveals no gallop and no friction rub.    No murmur heard.  Pulmonary/Chest: Effort normal and breath sounds normal. No respiratory distress. She has no wheezes. She has no rales.   Abdominal: Soft. Bowel sounds are normal. She exhibits no distension and no mass. There is no tenderness. There is no rebound and no guarding. No hernia.   Musculoskeletal: She exhibits no edema or deformity.   Lymphadenopathy:     She has no cervical adenopathy.   Neurological: She is alert and oriented to person, place, and time. She has normal reflexes. No cranial nerve deficit.   Skin: Skin is warm. No rash noted. No erythema.   Psychiatric: She has a normal mood and affect. Her behavior is normal.   Vitals reviewed.        Lab Review:   Lab Visit on 06/26/2017   Component Date Value    Specimen UA 06/26/2017 Urine, Clean Catch     Color, UA 06/26/2017 Yellow     Appearance, UA 06/26/2017 Clear     pH, UA 06/26/2017 7.0     Specific Gravity, UA 06/26/2017 1.010     Protein, UA 06/26/2017 Negative     Glucose, UA 06/26/2017 Negative     Ketones, UA 06/26/2017 Negative     Bilirubin (UA) 06/26/2017 Negative     Occult Blood UA 06/26/2017 Negative     Nitrite, UA 06/26/2017 Negative     Leukocytes, UA 06/26/2017 1+*    RBC, UA 06/26/2017 2     WBC, UA 06/26/2017 6*    Squam Epithel, UA 06/26/2017 6     Microscopic Comment 06/26/2017 SEE COMMENT    Lab Visit on 06/26/2017   Component Date Value    Glucose 06/26/2017 87     Sodium 06/26/2017 143     Potassium 06/26/2017 3.9     Chloride 06/26/2017 107     CO2 06/26/2017 28     BUN, Bld 06/26/2017 18     Calcium 06/26/2017 9.5     Creatinine 06/26/2017 1.2     Albumin 06/26/2017 3.9     Phosphorus 06/26/2017 3.0     eGFR if African  American 06/26/2017 56.0*    eGFR if non  Amer* 06/26/2017 48.6*    Anion Gap 06/26/2017 8     WBC 06/26/2017 6.10     RBC 06/26/2017 3.96*    Hemoglobin 06/26/2017 11.5*    Hematocrit 06/26/2017 36.7*    MCV 06/26/2017 93     MCH 06/26/2017 29.0     MCHC 06/26/2017 31.3*    RDW 06/26/2017 14.2     Platelets 06/26/2017 277     MPV 06/26/2017 10.4     Gran # 06/26/2017 3.7     Lymph # 06/26/2017 1.7     Mono # 06/26/2017 0.5     Eos # 06/26/2017 0.2     Baso # 06/26/2017 0.00     Gran% 06/26/2017 61.2     Lymph% 06/26/2017 28.2     Mono% 06/26/2017 7.5     Eosinophil% 06/26/2017 2.8     Basophil% 06/26/2017 0.0     Differential Method 06/26/2017 Automated     Aldosterone 06/28/2017 54.2     Renin Activity 06/28/2017 0.7    Clinical Support on 05/15/2017   Component Date Value    Sodium 05/15/2017 144     Potassium 05/15/2017 4.1     Chloride 05/15/2017 109     CO2 05/15/2017 23     Glucose 05/15/2017 95     BUN, Bld 05/15/2017 26*    Creatinine 05/15/2017 1.4     Calcium 05/15/2017 9.6     Total Protein 05/15/2017 6.8     Albumin 05/15/2017 3.8     Total Bilirubin 05/15/2017 0.5     Alkaline Phosphatase 05/15/2017 66     AST 05/15/2017 25     ALT 05/15/2017 25     Anion Gap 05/15/2017 12     eGFR if  05/15/2017 46*    eGFR if non  Amer* 05/15/2017 40*    WBC 05/15/2017 5.77     RBC 05/15/2017 4.08     Hemoglobin 05/15/2017 11.8*    Hematocrit 05/15/2017 37.6     MCV 05/15/2017 92     MCH 05/15/2017 28.9     MCHC 05/15/2017 31.4*    RDW 05/15/2017 13.8     Platelets 05/15/2017 243     MPV 05/15/2017 10.5     Cholesterol 05/15/2017 156     Triglycerides 05/15/2017 97     HDL 05/15/2017 55     LDL Cholesterol 05/15/2017 81.6     HDL/Chol Ratio 05/15/2017 35.3     Total Cholesterol/HDL Ra* 05/15/2017 2.8     Non-HDL Cholesterol 05/15/2017 101     Specimen UA 05/15/2017 Urine, Clean Catch     Color, UA 05/15/2017 Yellow      Appearance, UA 05/15/2017 Clear     pH, UA 05/15/2017 6.0     Specific Gravity, UA 05/15/2017 1.020     Protein, UA 05/15/2017 Negative     Glucose, UA 05/15/2017 Negative     Ketones, UA 05/15/2017 Negative     Bilirubin (UA) 05/15/2017 Negative     Occult Blood UA 05/15/2017 Negative     Nitrite, UA 05/15/2017 Negative     Leukocytes, UA 05/15/2017 1+*    WBC, UA 05/15/2017 1     Granular Casts, UA 05/15/2017 2*    Microscopic Comment 05/15/2017 SEE COMMENT        Assessment:     1. Hyperaldosteronism  CT Abdomen With Without Contrast   2. HTN (hypertension), benign          Considering the abount of elevation of aldosterone this is diagnostic of hyperaldosteronism and no confirmatory test such as salt loading is required however since it has been several years will repeat CT scan to see if adrenal abnormaility is visualized; if unrevealing will consider AVS    con't current meds for now  Plan:   Hyperaldosteronism  -     CT Abdomen With Without Contrast; Future; Expected date: 07/12/2017    HTN (hypertension), benign          No Follow-up on file.      This note is unavailable for viewing online. Certain specialties, including Behavioral Health and Pain Management, are currently not included in the open progress note program. For notes unavailable online, you can request a copy of your medical record.

## 2017-07-12 NOTE — LETTER
July 12, 2017      Cliff Barrera MD  [address]  [place] [address]           [facility] - Endocrinology  [address]  [address]  [place] [address]  Phone: [phone]  Fax: [phone]          Patient: Kacy Blackwell   MR Number: [id number]   YOB: 1955   Date of Visit: 7/12/2017       Dear Dr. Cliff Barrera:    Thank you for referring Kacy Blackwell to me for evaluation. Attached you will find relevant portions of my assessment and plan of care.    If you have questions, please do not hesitate to call me. I look forward to following Kacy Blackwell along with you.    Sincerely,    Rosalina Cabrera MD    Enclosure  CC:  No Recipients    If you would like to receive this communication electronically, please contact [email or web address] or [phone] to request more information on [facility] access.    For providers and/or their staff who would like to refer a patient to [facility], please contact us through our one-stop-shop provider referral line, [facility], at [phone].    If you feel you have received this communication in error or would no longer like to receive these types of communications, please e-mail [email or web address]

## 2017-07-14 ENCOUNTER — TELEPHONE (OUTPATIENT)
Dept: ADMINISTRATIVE | Facility: HOSPITAL | Age: 62
End: 2017-07-14

## 2017-07-14 ENCOUNTER — TELEPHONE (OUTPATIENT)
Dept: RADIOLOGY | Facility: HOSPITAL | Age: 62
End: 2017-07-14

## 2017-07-14 DIAGNOSIS — I10 ESSENTIAL HYPERTENSION: Primary | ICD-10-CM

## 2017-07-14 RX ORDER — METOPROLOL SUCCINATE 50 MG/1
50 TABLET, EXTENDED RELEASE ORAL DAILY
Qty: 90 TABLET | Refills: 3 | Status: SHIPPED | OUTPATIENT
Start: 2017-07-14 | End: 2018-04-06 | Stop reason: SDUPTHER

## 2017-07-14 NOTE — TELEPHONE ENCOUNTER
Called radiology at [facility] to discuss exactly what needs to be done with this patient. Radiology desk said place the order and schedule it one hour prior to ct at [facility]. Thy will call and notify patient of labs needed and arrival time for Monday. creatine order needs to be placed stat.

## 2017-07-14 NOTE — TELEPHONE ENCOUNTER
----- Message from Jacobo Moreira sent at 7/14/2017 12:19 PM CDT -----  Contact: Pt   States she is calling rg pt's pharm [facility] has made a request twice and has not received a response for her BP med and can be reached at [phone]/thanks/dbw   Pt is at pharm     1. What is the name of the medication you are requesting? metroprolol  2. What is the dose? 50mg  3. How do you take the medication? Orally, topically, etc? Orally   4. How often do you take this medication? Once daily   5. Do you need a 30 day or 90 day supply? As per Dr per pt   6. How many refills are you requesting? Per Dr  7. What is your preferred pharmacy and location of the pharmacy?   8. Who can we contact with further questions? Pt       [id number] - [address] [address]  Phone: [phone] Fax: [phone]    .

## 2017-07-14 NOTE — TELEPHONE ENCOUNTER
----- Message from Jayde Sims-Overs sent at 7/14/2017  7:35 AM CDT -----  Contact: Radiology  Hello,    This patient is scheduled for a CT on Monday. We will need a recent creatinine for this patient (within the last 30 days). Please place stat order and schedule patient 1 hr prior to radiology appointment. If you have any questions please contact Radiology at [phone].    Thank you,     Jayde  Radiology Dept

## 2017-07-17 ENCOUNTER — HOSPITAL ENCOUNTER (OUTPATIENT)
Dept: RADIOLOGY | Facility: HOSPITAL | Age: 62
Discharge: HOME | End: 2017-07-17
Attending: INTERNAL MEDICINE
Payer: COMMERCIAL

## 2017-07-17 DIAGNOSIS — E26.9 HYPERALDOSTERONISM: ICD-10-CM

## 2017-07-17 PROCEDURE — 74170 CT ABD WO CNTRST FLWD CNTRST: CPT | Mod: 26,,, | Performed by: RADIOLOGY

## 2017-07-17 PROCEDURE — 25500020 PHARM REV CODE 255: Mod: PO | Performed by: INTERNAL MEDICINE

## 2017-07-17 PROCEDURE — 74170 CT ABD WO CNTRST FLWD CNTRST: CPT | Mod: TC,PO

## 2017-07-17 RX ADMIN — IOHEXOL 100 ML: 350 INJECTION, SOLUTION INTRAVENOUS at 10:07

## 2017-07-17 RX ADMIN — IOHEXOL 30 ML: 350 INJECTION, SOLUTION INTRAVENOUS at 09:07

## 2017-07-27 ENCOUNTER — PATIENT MESSAGE (OUTPATIENT)
Dept: ENDOCRINOLOGY | Facility: CLINIC | Age: 62
End: 2017-07-27

## 2017-07-28 ENCOUNTER — PATIENT MESSAGE (OUTPATIENT)
Dept: ADMINISTRATIVE | Facility: HOSPITAL | Age: 62
End: 2017-07-28

## 2017-07-28 ENCOUNTER — TELEPHONE (OUTPATIENT)
Dept: ADMINISTRATIVE | Facility: HOSPITAL | Age: 62
End: 2017-07-28

## 2017-07-28 DIAGNOSIS — E26.9 HYPERALDOSTERONISM: Primary | ICD-10-CM

## 2017-07-28 NOTE — TELEPHONE ENCOUNTER
Left a detailed VM message for patient, portal message sent advising, Date of test ordered by Dr. Cabrera, instructions to STOP aspirin as of today and no food or drink after midnight on Aug. 2, 2017 prior to procedure. Contact information included on vm and my chart message.

## 2017-08-02 ENCOUNTER — TELEPHONE (OUTPATIENT)
Dept: NEPHROLOGY | Facility: CLINIC | Age: 62
End: 2017-08-02

## 2017-08-02 NOTE — TELEPHONE ENCOUNTER
----- Message from Desiree Villela sent at 8/2/2017  4:18 PM CDT -----  Contact: Pt   Pt called and stated she was returning the nurse. She can be reached at [phone] (home)       Thanks,  TF

## 2017-08-02 NOTE — TELEPHONE ENCOUNTER
Called pt. Several times per radiology as pt. Is scheduled for adrenal vein procedure but stated that she will not do the procedure unless she speaks with Dr. Barrera's office. I have not been able to reach pt. I was told per family member she was not home when I called home phone, and I called cell phone and left a message.

## 2017-08-03 ENCOUNTER — TELEPHONE (OUTPATIENT)
Dept: ENDOCRINOLOGY | Facility: CLINIC | Age: 62
End: 2017-08-03

## 2017-08-03 NOTE — TELEPHONE ENCOUNTER
Pt requested to speak with MD concerning vein mapping procedure. She has many questions and concerns. Please advise.

## 2017-08-07 ENCOUNTER — OFFICE VISIT (OUTPATIENT)
Dept: ENDOCRINOLOGY | Facility: CLINIC | Age: 62
End: 2017-08-07
Payer: COMMERCIAL

## 2017-08-07 VITALS
HEART RATE: 72 BPM | SYSTOLIC BLOOD PRESSURE: 124 MMHG | DIASTOLIC BLOOD PRESSURE: 74 MMHG | BODY MASS INDEX: 26.8 KG/M2 | HEIGHT: 63 IN | TEMPERATURE: 98 F | WEIGHT: 151.25 LBS

## 2017-08-07 DIAGNOSIS — E26.9 HYPERALDOSTERONISM: Primary | ICD-10-CM

## 2017-08-07 DIAGNOSIS — I10 HTN (HYPERTENSION), BENIGN: ICD-10-CM

## 2017-08-07 DIAGNOSIS — E87.6 HYPOKALEMIA: ICD-10-CM

## 2017-08-07 PROCEDURE — 3008F BODY MASS INDEX DOCD: CPT | Mod: S$GLB,,, | Performed by: INTERNAL MEDICINE

## 2017-08-07 PROCEDURE — 99214 OFFICE O/P EST MOD 30 MIN: CPT | Mod: S$GLB,,, | Performed by: INTERNAL MEDICINE

## 2017-08-07 PROCEDURE — 3078F DIAST BP <80 MM HG: CPT | Mod: S$GLB,,, | Performed by: INTERNAL MEDICINE

## 2017-08-07 PROCEDURE — 99999 PR PBB SHADOW E&M-EST. PATIENT-LVL III: CPT | Mod: PBBFAC,,, | Performed by: INTERNAL MEDICINE

## 2017-08-07 PROCEDURE — 3074F SYST BP LT 130 MM HG: CPT | Mod: S$GLB,,, | Performed by: INTERNAL MEDICINE

## 2017-08-07 NOTE — PROGRESS NOTES
""This note will be shared with the patient" Patient ID: Kacy Blackwell is a 61 y.o. female.  Patient is here for follow up        Chief Complaint: Multidisciplinary Discussion (hyperaldosteronism)    Patient has several questions regarding the procedure adrenal vein sampling  HPI  Hx of hypokalemia and hypertension since 2009. Initially seen by nephrology in 2013  Aldosterone at the time was 26 and PRA was suppressed and CT scan  Showed normal adrenal glands  She was on aldactone but requested to stop because of her concern she read about it causing tumors in animals so she was switched to triamterone and is on potassium  Recent labs show nl potassium and B/P has been controlled, also on metoprolol    After last visit I repeated her CAT scan again normal and aldosterone was elevated    I ordered adrenal vein sampling  I have reviewed the past medical, family and social history    Review of Systems   Constitutional: Negative for appetite change, fatigue, fever and unexpected weight change.   HENT: Negative for sore throat and trouble swallowing.    Eyes: Negative for visual disturbance.   Respiratory: Negative for shortness of breath and wheezing.    Cardiovascular: Negative for chest pain, palpitations and leg swelling.   Gastrointestinal: Negative for diarrhea, nausea and vomiting.   Endocrine: Negative for cold intolerance, heat intolerance, polydipsia, polyphagia and polyuria.   Genitourinary: Negative for difficulty urinating, dysuria and menstrual problem.   Musculoskeletal: Negative for arthralgias and joint swelling.   Skin: Negative for rash.   Neurological: Negative for dizziness, weakness, numbness and headaches.   Psychiatric/Behavioral: Negative for confusion, dysphoric mood and sleep disturbance.       Objective:      Physical Exam   Constitutional: She appears well-developed and well-nourished. No distress.   HENT:   Head: Normocephalic and atraumatic.   Eyes: Conjunctivae are normal. "   Musculoskeletal: She exhibits no edema.   Neurological: She is alert. No sensory deficit.        Skin: Skin is warm and dry. No rash noted. She is not diaphoretic. No erythema.   Psychiatric: She has a normal mood and affect. Her behavior is normal.   Vitals reviewed.        Lab Review:   Lab Visit on 07/17/2017   Component Date Value    BUN, Bld 07/17/2017 21     Creatinine 07/17/2017 1.4     eGFR if  07/17/2017 46*    eGFR if non African Amer* 07/17/2017 40*   Lab Visit on 06/26/2017   Component Date Value    Specimen UA 06/26/2017 Urine, Clean Catch     Color, UA 06/26/2017 Yellow     Appearance, UA 06/26/2017 Clear     pH, UA 06/26/2017 7.0     Specific Gravity, UA 06/26/2017 1.010     Protein, UA 06/26/2017 Negative     Glucose, UA 06/26/2017 Negative     Ketones, UA 06/26/2017 Negative     Bilirubin (UA) 06/26/2017 Negative     Occult Blood UA 06/26/2017 Negative     Nitrite, UA 06/26/2017 Negative     Leukocytes, UA 06/26/2017 1+*    RBC, UA 06/26/2017 2     WBC, UA 06/26/2017 6*    Squam Epithel, UA 06/26/2017 6     Microscopic Comment 06/26/2017 SEE COMMENT    Lab Visit on 06/26/2017   Component Date Value    Glucose 06/26/2017 87     Sodium 06/26/2017 143     Potassium 06/26/2017 3.9     Chloride 06/26/2017 107     CO2 06/26/2017 28     BUN, Bld 06/26/2017 18     Calcium 06/26/2017 9.5     Creatinine 06/26/2017 1.2     Albumin 06/26/2017 3.9     Phosphorus 06/26/2017 3.0     eGFR if  06/26/2017 56.0*    eGFR if non  Amer* 06/26/2017 48.6*    Anion Gap 06/26/2017 8     WBC 06/26/2017 6.10     RBC 06/26/2017 3.96*    Hemoglobin 06/26/2017 11.5*    Hematocrit 06/26/2017 36.7*    MCV 06/26/2017 93     MCH 06/26/2017 29.0     MCHC 06/26/2017 31.3*    RDW 06/26/2017 14.2     Platelets 06/26/2017 277     MPV 06/26/2017 10.4     Gran # 06/26/2017 3.7     Lymph # 06/26/2017 1.7     Mono # 06/26/2017 0.5     Eos # 06/26/2017 0.2      Baso # 06/26/2017 0.00     Gran% 06/26/2017 61.2     Lymph% 06/26/2017 28.2     Mono% 06/26/2017 7.5     Eosinophil% 06/26/2017 2.8     Basophil% 06/26/2017 0.0     Differential Method 06/26/2017 Automated     Aldosterone 06/28/2017 54.2     Renin Activity 06/28/2017 0.7        Assessment:     1. Hyperaldosteronism      I answered all patient's questions regarding adrenal vein sampling and handout given.  At this time she would like to think about having the procedure   2. Hypokalemia      Stable on medication   3. HTN (hypertension), benign      Stable on medication      Plan:   Hyperaldosteronism  Comments:  I answered all patient's questions regarding adrenal vein sampling and handout given.  At this time she would like to think about having the procedure    Hypokalemia  Comments:  Stable on medication    HTN (hypertension), benign  Comments:  Stable on medication          Return in about 3 months (around 11/7/2017).    Labs prior to appointment? no

## 2017-09-21 ENCOUNTER — LAB VISIT (OUTPATIENT)
Dept: LAB | Facility: HOSPITAL | Age: 62
End: 2017-09-21
Attending: INTERNAL MEDICINE
Payer: COMMERCIAL

## 2017-09-21 ENCOUNTER — TELEPHONE (OUTPATIENT)
Dept: NEPHROLOGY | Facility: CLINIC | Age: 62
End: 2017-09-21

## 2017-09-21 DIAGNOSIS — I10 ESSENTIAL HYPERTENSION: ICD-10-CM

## 2017-09-21 DIAGNOSIS — I10 ESSENTIAL HYPERTENSION: Primary | ICD-10-CM

## 2017-09-21 LAB
ALBUMIN SERPL BCP-MCNC: 3.9 G/DL
ANION GAP SERPL CALC-SCNC: 8 MMOL/L
BASOPHILS # BLD AUTO: 0 K/UL
BASOPHILS NFR BLD: 0 %
BUN SERPL-MCNC: 22 MG/DL
CALCIUM SERPL-MCNC: 10.1 MG/DL
CHLORIDE SERPL-SCNC: 105 MMOL/L
CO2 SERPL-SCNC: 30 MMOL/L
CREAT SERPL-MCNC: 1.2 MG/DL
DIFFERENTIAL METHOD: ABNORMAL
EOSINOPHIL # BLD AUTO: 0.2 K/UL
EOSINOPHIL NFR BLD: 2 %
ERYTHROCYTE [DISTWIDTH] IN BLOOD BY AUTOMATED COUNT: 14.3 %
EST. GFR  (AFRICAN AMERICAN): 56 ML/MIN/1.73 M^2
EST. GFR  (NON AFRICAN AMERICAN): 48.6 ML/MIN/1.73 M^2
GLUCOSE SERPL-MCNC: 100 MG/DL
HCT VFR BLD AUTO: 35.5 %
HGB BLD-MCNC: 11.7 G/DL
LYMPHOCYTES # BLD AUTO: 2.5 K/UL
LYMPHOCYTES NFR BLD: 33.4 %
MCH RBC QN AUTO: 29.3 PG
MCHC RBC AUTO-ENTMCNC: 33 G/DL
MCV RBC AUTO: 89 FL
MONOCYTES # BLD AUTO: 0.7 K/UL
MONOCYTES NFR BLD: 8.8 %
NEUTROPHILS # BLD AUTO: 4.2 K/UL
NEUTROPHILS NFR BLD: 55.5 %
PHOSPHATE SERPL-MCNC: 3.3 MG/DL
PLATELET # BLD AUTO: 264 K/UL
PMV BLD AUTO: 10.3 FL
POTASSIUM SERPL-SCNC: 3.6 MMOL/L
RBC # BLD AUTO: 4 M/UL
SODIUM SERPL-SCNC: 143 MMOL/L
WBC # BLD AUTO: 7.54 K/UL

## 2017-09-21 PROCEDURE — 85025 COMPLETE CBC W/AUTO DIFF WBC: CPT

## 2017-09-21 PROCEDURE — 36415 COLL VENOUS BLD VENIPUNCTURE: CPT | Mod: PO

## 2017-09-21 PROCEDURE — 80069 RENAL FUNCTION PANEL: CPT

## 2017-09-21 NOTE — TELEPHONE ENCOUNTER
----- Message from Michelle Patel sent at 9/21/2017 11:42 AM CDT -----  Contact: Patient   Patient returned call, Please call her at [phone] regards to doing labs.    Thanks  td

## 2017-09-25 ENCOUNTER — OFFICE VISIT (OUTPATIENT)
Dept: NEPHROLOGY | Facility: CLINIC | Age: 62
End: 2017-09-25
Payer: COMMERCIAL

## 2017-09-25 ENCOUNTER — OFFICE VISIT (OUTPATIENT)
Dept: INTERNAL MEDICINE | Facility: CLINIC | Age: 62
End: 2017-09-25
Payer: COMMERCIAL

## 2017-09-25 VITALS
TEMPERATURE: 95 F | HEIGHT: 63 IN | HEART RATE: 68 BPM | DIASTOLIC BLOOD PRESSURE: 90 MMHG | WEIGHT: 153.44 LBS | SYSTOLIC BLOOD PRESSURE: 140 MMHG | BODY MASS INDEX: 27.19 KG/M2

## 2017-09-25 VITALS
WEIGHT: 155.63 LBS | BODY MASS INDEX: 27.57 KG/M2 | HEART RATE: 76 BPM | DIASTOLIC BLOOD PRESSURE: 90 MMHG | SYSTOLIC BLOOD PRESSURE: 142 MMHG | HEIGHT: 63 IN

## 2017-09-25 DIAGNOSIS — I10 HTN (HYPERTENSION), BENIGN: Primary | ICD-10-CM

## 2017-09-25 DIAGNOSIS — I10 HTN (HYPERTENSION), BENIGN: ICD-10-CM

## 2017-09-25 DIAGNOSIS — J30.89 ACUTE NONSEASONAL ALLERGIC RHINITIS DUE TO OTHER ALLERGEN: Primary | ICD-10-CM

## 2017-09-25 PROCEDURE — 3080F DIAST BP >= 90 MM HG: CPT | Mod: S$GLB,,, | Performed by: INTERNAL MEDICINE

## 2017-09-25 PROCEDURE — 96372 THER/PROPH/DIAG INJ SC/IM: CPT | Mod: S$GLB,,, | Performed by: NURSE PRACTITIONER

## 2017-09-25 PROCEDURE — 3080F DIAST BP >= 90 MM HG: CPT | Mod: S$GLB,,, | Performed by: NURSE PRACTITIONER

## 2017-09-25 PROCEDURE — 99214 OFFICE O/P EST MOD 30 MIN: CPT | Mod: S$GLB,,, | Performed by: INTERNAL MEDICINE

## 2017-09-25 PROCEDURE — 3077F SYST BP >= 140 MM HG: CPT | Mod: S$GLB,,, | Performed by: INTERNAL MEDICINE

## 2017-09-25 PROCEDURE — 99999 PR PBB SHADOW E&M-EST. PATIENT-LVL III: CPT | Mod: PBBFAC,,, | Performed by: INTERNAL MEDICINE

## 2017-09-25 PROCEDURE — 99999 PR PBB SHADOW E&M-EST. PATIENT-LVL IV: CPT | Mod: PBBFAC,,, | Performed by: NURSE PRACTITIONER

## 2017-09-25 PROCEDURE — 3008F BODY MASS INDEX DOCD: CPT | Mod: S$GLB,,, | Performed by: NURSE PRACTITIONER

## 2017-09-25 PROCEDURE — 3077F SYST BP >= 140 MM HG: CPT | Mod: S$GLB,,, | Performed by: NURSE PRACTITIONER

## 2017-09-25 PROCEDURE — 3008F BODY MASS INDEX DOCD: CPT | Mod: S$GLB,,, | Performed by: INTERNAL MEDICINE

## 2017-09-25 PROCEDURE — 99214 OFFICE O/P EST MOD 30 MIN: CPT | Mod: 25,S$GLB,, | Performed by: NURSE PRACTITIONER

## 2017-09-25 RX ORDER — BETAMETHASONE SODIUM PHOSPHATE AND BETAMETHASONE ACETATE 3; 3 MG/ML; MG/ML
12 INJECTION, SUSPENSION INTRA-ARTICULAR; INTRALESIONAL; INTRAMUSCULAR; SOFT TISSUE
Status: COMPLETED | OUTPATIENT
Start: 2017-09-25 | End: 2017-09-25

## 2017-09-25 RX ADMIN — BETAMETHASONE SODIUM PHOSPHATE AND BETAMETHASONE ACETATE 12 MG: 3; 3 INJECTION, SUSPENSION INTRA-ARTICULAR; INTRALESIONAL; INTRAMUSCULAR; SOFT TISSUE at 03:09

## 2017-09-25 NOTE — PATIENT INSTRUCTIONS
Avoid NSAID pain medications such as advil, aleve, motrin, ibuprofen, naprosyn, meloxicam, diclofenac, mobic.     Low salt diet as discussed     goal blood pressure is less than 140/90     keep a track of daily BPs and bring at next appt     bring all meds in a bag at next appt     Nurse visit in 2 weeks for BP and home BP reading review

## 2017-09-25 NOTE — PROGRESS NOTES
Subjective:       Patient ID: Kacy Blackwell is a 61 y.o.   female who presents for follow-up evaluation of HTN , CKD stage 3     Gurinder Pierce MD      HPI:  Ms Kacy Blackwell is a 61-year-old AA woman seen in office today in f/u  for above medical problems .  I saw her in clinic about 3 months ago.  Patient was referred to endocrinology for further evaluation of primary hyperaldosteronism.  Serum renin/Ezra ratio is significantly elevated.  Next up in the workup include adrenal vein sampling, CT scan of the abdomen shows normal adrenal glands.  Patient is currently on 2 blood pressure medications with a decent blood pressure control.  She would like to hold off on any invasive procedures at this time.  Her mother also has hypertension and is on about 8 blood pressure medications, she is 95.   Patient has chronic hypokalemia dating back to 2009. She was started on potassium supplements.  Patient was initially started on Aldactone. Later  switched to triamterene secondary to concerns of side effects of aldactone, she has been tolerating Triamterene well, not very consistent with low salt diet, patient reports in the recent past she has been having symptoms due to seasonal ALLERGIES.         Past Medical History:   Diagnosis Date    Chronic cough     controlled w flonase/zyrtec    Hyperaldosteronism     dr cummings    Hyperlipidemia     Hypertension     Hypokalemia     Osteoporosis 12/16 naeem 12/18    Seasonal allergies          Current Outpatient Prescriptions on File Prior to Visit   Medication Sig Dispense Refill    alendronate (FOSAMAX) 70 MG tablet Take 1 tablet once weekly in the morning with a full glass of water on an empty stomach. Do not lie down for at least 30 minutes afterwards. 12 tablet 3    aspirin 81 MG Chew Take 81 mg by mouth once daily.      BIOTIN ORAL Take 10,000 mg by mouth once daily.       calcium carbonate-vit D3-min (CALTRATE 600+D PLUS MINERALS) 600 mg  calcium- 400 unit Tab Take 1 tablet by mouth once daily.      CETIRIZINE HCL (ZYRTEC ORAL) Take by mouth.      metoprolol succinate (TOPROL-XL) 50 MG 24 hr tablet Take 1 tablet (50 mg total) by mouth once daily. 90 tablet 3    multivitamin capsule Take 1 capsule by mouth once daily.      peg 400-propylene glycol, PF, (SYSTANE, PF,) 0.4-0.3 % Dpet Apply to eye.      potassium chloride SA (K-DUR,KLOR-CON) 20 MEQ tablet TAKE ONE TABLET BY MOUTH TWICE DAILY 60 tablet 4    rosuvastatin (CRESTOR) 5 MG tablet TAKE ONE TABLET BY MOUTH AT BEDTIME 30 tablet 9    triamterene (DYRENIUM) 100 MG Cap Take 1 capsule (100 mg total) by mouth once daily. 90 capsule 3    zolpidem (AMBIEN) 5 MG Tab Take 1 tablet (5 mg total) by mouth nightly as needed. 30 tablet 0     No current facility-administered medications on file prior to visit.          Review of Systems   Constitutional: Positive for activity change and fatigue. Negative for appetite change and fever.   HENT: Positive for congestion, postnasal drip and rhinorrhea. Negative for facial swelling, sore throat, trouble swallowing and voice change.    Eyes: Negative for redness and visual disturbance.   Respiratory: Negative for apnea, cough, chest tightness, shortness of breath and wheezing.    Cardiovascular: Negative for chest pain, palpitations and leg swelling.   Gastrointestinal: Negative for abdominal distention, abdominal pain, blood in stool, constipation, diarrhea, nausea and vomiting.   Genitourinary: Negative for decreased urine volume, difficulty urinating, dysuria, flank pain, frequency, hematuria, pelvic pain and urgency.   Musculoskeletal: Negative for back pain, gait problem and joint swelling.   Skin: Negative for color change and rash.   Neurological: Negative for dizziness, syncope, weakness and headaches.   Hematological: Does not bruise/bleed easily.   Psychiatric/Behavioral: Negative for agitation, behavioral problems and confusion. The patient is not  nervous/anxious.      :            Objective:         Vitals:    09/25/17 1404   BP: (!) 142/90   Pulse: 76       Respiratory rate 20, afebrile, weight 155 pounds, stable    Physical Exam  :    Constitutional: She is oriented to person, place, and time. She appears well-developed and well-nourished. No distress.   HENT: Head: Normocephalic and atraumatic.   Mouth/Throat: Oropharynx is clear and moist. No oropharyngeal exudate.   Eyes: Conjunctivae normal and EOM are normal. Pupils are equal, round, and reactive to light. No scleral icterus.   Neck: Normal range of motion. Neck supple. No JVD present. Carotid bruit is not present. No tracheal deviation present. No mass and no thyromegaly present.   Cardiovascular: Normal rate, regular rhythm, normal heart sounds and intact distal pulses. Exam reveals no gallop and no friction rub.   No murmur heard.  Pulmonary/Chest: Effort normal and breath sounds normal. No respiratory distress. She has no wheezes. She has no rales. She exhibits no tenderness.   Abdominal: Soft. Bowel sounds are normal. She exhibits no distension, no abdominal bruit, no ascites and no mass. There is no hepatosplenomegaly. There is no tenderness. There is no rebound, no guarding and no CVA tenderness.   Musculoskeletal: Normal range of motion. She exhibits no edema and no tenderness.   Lymphadenopathy: She has no cervical adenopathy.   Neurological: She is alert and oriented to person, place, and time. She has normal reflexes. No cranial nerve deficit. She exhibits normal muscle tone. Coordination normal.   Skin: Skin is warm and intact. No rash noted. No erythema. No pallor.   Psychiatric: She has a normal mood and affect. Her behavior is normal. Judgment normal.                 Labs:    Lab Results   Component Value Date    CREATININE 1.2 09/21/2017    BUN 22 09/21/2017     09/21/2017    K 3.6 09/21/2017     09/21/2017    CO2 30 (H) 09/21/2017       Lab Results   Component Value Date     WBC 7.54 09/21/2017    HGB 11.7 (L) 09/21/2017    HCT 35.5 (L) 09/21/2017    MCV 89 09/21/2017     09/21/2017       Lab Results   Component Value Date    CALCIUM 10.1 09/21/2017    PHOS 3.3 09/21/2017       Lab Results   Component Value Date    ALBUMIN 3.9 09/21/2017       Lab Results   Component Value Date    CALCIUM 10.1 09/21/2017    PHOS 3.3 09/21/2017       61 -year-old  woman seen office today in f/u for following medical problems      1.  Hypertension - her blood pressure is slightly elevated today, she has been having some headache due to seasonal ALLERGIES.  Home blood pressure readings reviewed, well controlled.  Continue current regimen.  Discussed low-salt diet.  Nurse visit in 2 weeks for repeat blood pressure check. Echocardiogram in the next few weeks to assess LV function.    2.  Primary hyperaldosteronism. CAT scan of the abdomen shows normal adrenal glands. potassium levels are stable with current medications. Continue triamtrene 100 mg daily. Also she is on potassium chloride 20 mEq twice a day.   recent notes by endocrine reviewed , further workup includes adrenal vein sampling, patient wants to hold off on any invasive procedures at this time as her blood pressure is decently controlled on medications.    3. CKD stage 3 : Stable renal function with a serum creatinine of 1.2 mg/dL, advised patient to avoid NSAID use.     4.  Recent urinalysis shows normal range proteinuria.     We'll see her in followup in about 3 months.   more than 25 minutes of face-to-face time was spent with the patient discussing labs and plan of care.      Sincerely,      Cliff Barrera M.D.

## 2017-09-25 NOTE — PATIENT INSTRUCTIONS
Increase fluid intake  7-8 bottles of water daily  Take mucinex (plain)- stop mucinex max   Monitor BP closely

## 2017-09-25 NOTE — PROGRESS NOTES
Subjective:       Patient ID: Kacy Blackwell is a 61 y.o. female.    Chief Complaint: Sinus Problem and Cough    Sinus Problem   This is a new problem. Episode onset: 3 weeks  The problem has been gradually worsening since onset. There has been no fever. Associated symptoms include congestion, coughing, headaches and sinus pressure. Pertinent negatives include no chills, diaphoresis, ear pain, hoarse voice, neck pain, shortness of breath, sneezing, sore throat or swollen glands. Treatments tried: mucinex, zyrtec  The treatment provided mild relief.     Review of Systems   Constitutional: Negative for chills, diaphoresis, fatigue and fever.   HENT: Positive for congestion and sinus pressure. Negative for ear pain, hoarse voice, postnasal drip, rhinorrhea, sneezing and sore throat.         Itchy eyes   Eyes: Negative for photophobia, pain and visual disturbance.   Respiratory: Positive for cough. Negative for chest tightness and shortness of breath.    Cardiovascular: Negative for chest pain, palpitations and leg swelling.   Gastrointestinal: Negative for abdominal pain, constipation, diarrhea, nausea and vomiting.   Genitourinary: Negative for dysuria and frequency.   Musculoskeletal: Negative for arthralgias and neck pain.   Neurological: Positive for headaches. Negative for dizziness and light-headedness.   Psychiatric/Behavioral: Negative for sleep disturbance.       Objective:      Physical Exam   Constitutional: She is oriented to person, place, and time. She appears well-developed and well-nourished.   HENT:   Head: Normocephalic and atraumatic.   Nose: Mucosal edema and rhinorrhea present.   Allergic shiners are present. There is clear discharge with swollen, boggy turbinates, and pale mucosa noted.   Eyes: Conjunctivae and EOM are normal. Right eye exhibits no discharge. Left eye exhibits no discharge.   Cardiovascular: Normal rate and regular rhythm.  Exam reveals no friction rub.    No murmur  heard.  Pulmonary/Chest: Effort normal and breath sounds normal. No respiratory distress. She has no wheezes.   Neurological: She is alert and oriented to person, place, and time.   Skin: Skin is warm and dry.       Assessment:       1. Acute nonseasonal allergic rhinitis due to other allergen    2. HTN (hypertension), benign        Plan:   Acute nonseasonal allergic rhinitis due to other allergen  Comments:  dust mites   Orders:  -     betamethasone acetate-betamethasone sodium phosphate injection 12 mg; Inject 2 mLs (12 mg total) into the muscle one time.    HTN (hypertension), benign    as above  Continue claritin  Side effects of steroids discussed  Increase fluid intake  7-8 bottles of water daily  Take mucinex (plain)- stop mucinex max   Monitor BP closely   Avoid NSAIDs take tylenol for headache if needed  Call if symptoms worsen or fail to improve within the next 2-3 days

## 2017-10-09 ENCOUNTER — CLINICAL SUPPORT (OUTPATIENT)
Dept: CARDIOLOGY | Facility: CLINIC | Age: 62
End: 2017-10-09
Payer: COMMERCIAL

## 2017-10-09 ENCOUNTER — CLINICAL SUPPORT (OUTPATIENT)
Dept: NEPHROLOGY | Facility: CLINIC | Age: 62
End: 2017-10-09
Payer: COMMERCIAL

## 2017-10-09 VITALS — DIASTOLIC BLOOD PRESSURE: 88 MMHG | HEART RATE: 72 BPM | SYSTOLIC BLOOD PRESSURE: 134 MMHG

## 2017-10-09 DIAGNOSIS — I10 HTN (HYPERTENSION), BENIGN: ICD-10-CM

## 2017-10-09 LAB
ESTIMATED PA SYSTOLIC PRESSURE: 34.36
RETIRED EF AND QEF - SEE NOTES: 55 (ref 55–65)
TRICUSPID VALVE REGURGITATION: NORMAL

## 2017-10-09 PROCEDURE — 93306 TTE W/DOPPLER COMPLETE: CPT | Mod: S$GLB,,, | Performed by: NUCLEAR MEDICINE

## 2017-10-09 PROCEDURE — 99999 PR PBB SHADOW E&M-EST. PATIENT-LVL II: CPT | Mod: PBBFAC,,,

## 2017-10-09 NOTE — PROGRESS NOTES
Pt. Presents for BP check. Initial BP is 132/100 pt. Sat for 15mins recheck BP is 134/88. Advised pt. That I will send readings to Dr. Barrera for review. Advised pt. To have nurses and physicians wait at least 15 mins before check BP. Pt. Verbalized understanding.

## 2017-10-11 RX ORDER — POTASSIUM CHLORIDE 20 MEQ/1
TABLET, EXTENDED RELEASE ORAL
Qty: 60 TABLET | Refills: 3 | Status: SHIPPED | OUTPATIENT
Start: 2017-10-11 | End: 2018-03-22 | Stop reason: SDUPTHER

## 2017-10-11 RX ORDER — POTASSIUM CHLORIDE 20 MEQ/1
TABLET, EXTENDED RELEASE ORAL
Qty: 60 TABLET | Refills: 4 | Status: SHIPPED | OUTPATIENT
Start: 2017-10-11 | End: 2017-12-04

## 2017-10-30 ENCOUNTER — PATIENT MESSAGE (OUTPATIENT)
Dept: INTERNAL MEDICINE | Facility: CLINIC | Age: 62
End: 2017-10-30

## 2017-10-31 RX ORDER — ZOLPIDEM TARTRATE 5 MG/1
5 TABLET ORAL NIGHTLY PRN
Qty: 30 TABLET | Refills: 0 | Status: SHIPPED | OUTPATIENT
Start: 2017-10-31 | End: 2018-03-19 | Stop reason: SDUPTHER

## 2017-11-13 ENCOUNTER — OFFICE VISIT (OUTPATIENT)
Dept: INTERNAL MEDICINE | Facility: CLINIC | Age: 62
End: 2017-11-13
Payer: COMMERCIAL

## 2017-11-13 VITALS
BODY MASS INDEX: 27.46 KG/M2 | OXYGEN SATURATION: 98 % | WEIGHT: 155 LBS | HEART RATE: 77 BPM | SYSTOLIC BLOOD PRESSURE: 134 MMHG | TEMPERATURE: 98 F | DIASTOLIC BLOOD PRESSURE: 82 MMHG | HEIGHT: 63 IN

## 2017-11-13 DIAGNOSIS — I10 HTN (HYPERTENSION), BENIGN: Primary | ICD-10-CM

## 2017-11-13 DIAGNOSIS — E26.9 HYPERALDOSTERONISM: ICD-10-CM

## 2017-11-13 DIAGNOSIS — G47.09 OTHER INSOMNIA: ICD-10-CM

## 2017-11-13 DIAGNOSIS — M81.0 OSTEOPOROSIS, UNSPECIFIED OSTEOPOROSIS TYPE, UNSPECIFIED PATHOLOGICAL FRACTURE PRESENCE: ICD-10-CM

## 2017-11-13 DIAGNOSIS — Z12.31 ENCOUNTER FOR SCREENING MAMMOGRAM FOR MALIGNANT NEOPLASM OF BREAST: ICD-10-CM

## 2017-11-13 PROCEDURE — 99214 OFFICE O/P EST MOD 30 MIN: CPT | Mod: S$GLB,,, | Performed by: FAMILY MEDICINE

## 2017-11-13 PROCEDURE — 99999 PR PBB SHADOW E&M-EST. PATIENT-LVL III: CPT | Mod: PBBFAC,,, | Performed by: FAMILY MEDICINE

## 2017-11-13 RX ORDER — MINERAL OIL
180 ENEMA (ML) RECTAL DAILY
Qty: 30 TABLET | Refills: 11 | Status: SHIPPED | OUTPATIENT
Start: 2017-11-13 | End: 2018-05-07

## 2017-11-13 NOTE — PROGRESS NOTES
Subjective:       Patient ID: Kacy Blackwell is a 62 y.o. female.    Chief Complaint: Multiple issues see below    HPIhtn/hyperalod utd specialist;saw castro haile and efren deferred  Insomnia hx prn ambien:#21 lasts 5 months  hyperchol shad med    Past Medical History:   Diagnosis Date    Chronic cough     controlled w flonase/zyrtec    Hyperaldosteronism     dr cummings    Hyperlipidemia     Hypertension     Hypokalemia     Osteoporosis 12/16 naeem 12/18    Seasonal allergies      Past Surgical History:   Procedure Laterality Date    cyst removed from head      HYSTERECTOMY  1980    cervix absent     Family History   Problem Relation Age of Onset    Hypertension Mother     Arthritis Mother     Diabetes Mother     Heart failure Mother     Hypertension Father     Arthritis Father     Cancer Sister     Hypertension Brother     Diabetes Brother     Stroke Brother     Hypertension Sister     Breast cancer Neg Hx     Colon cancer Neg Hx     Ovarian cancer Neg Hx      Social History     Social History    Marital status:      Spouse name: N/A    Number of children: N/A    Years of education: N/A     Social History Main Topics    Smoking status: Never Smoker    Smokeless tobacco: Never Used    Alcohol use Yes      Comment: rare social drinker    Drug use: No    Sexual activity: Yes     Partners: Male     Other Topics Concern    None     Social History Narrative    . 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 She appears well-developed and well-nourished. No distress.   HENT:   Head: Atraumatic.   Right Ear: External ear normal.   Left Ear: External ear normal.   Nose: Nose normal.   Mouth/Throat: Oropharynx is clear and moist. No oropharyngeal exudate.   Nl tms   Eyes: Conjunctivae and EOM are normal.  Pupils are equal, round, and reactive to light. No scleral icterus.   Neck: Normal range of motion. Neck supple. No thyromegaly present.   Cardiovascular: Normal rate, regular rhythm and normal heart sounds.    No murmur heard.  Pulmonary/Chest: Effort normal and breath sounds normal. No respiratory distress. She has no wheezes. She has no rales.   Lymphadenopathy:     She has no cervical adenopathy.   Neurological: She is alert and oriented to person, place, and time. No cranial nerve deficit. She exhibits normal muscle tone. Coordination normal.   Skin: Skin is warm. No rash noted. No erythema. No pallor.   Psychiatric: She has a normal mood and affect. Her behavior is normal. Judgment and thought content normal.   Nursing note and vitals reviewed.      Assessment:       1. HTN (hypertension), benign    2. Osteoporosis, unspecified osteoporosis type, unspecified pathological fracture presence    3. Hyperaldosteronism    4. Other insomnia        Plan:       **f/u specialists when due*    F/u [facility] she will sched  Change otc zyrtec to allegra  HTN (hypertension), benign    Osteoporosis, unspecified osteoporosis type, unspecified pathological fracture presence    Hyperaldosteronism    Other insomnia    Encounter for screening mammogram for malignant neoplasm of breast  -     Mammo Digital Screening Bilat with CAD; Future; Expected date: 11/29/2017    Other orders  -     fexofenadine (ALLEGRA) 180 MG tablet; Take 1 tablet (180 mg total) by mouth once daily.  Dispense: 30 tablet; Refill: 11

## 2017-12-01 ENCOUNTER — HOSPITAL ENCOUNTER (OUTPATIENT)
Dept: RADIOLOGY | Facility: HOSPITAL | Age: 62
Discharge: HOME | End: 2017-12-01
Attending: FAMILY MEDICINE
Payer: COMMERCIAL

## 2017-12-01 DIAGNOSIS — Z12.31 ENCOUNTER FOR SCREENING MAMMOGRAM FOR MALIGNANT NEOPLASM OF BREAST: ICD-10-CM

## 2017-12-01 PROCEDURE — 77067 SCR MAMMO BI INCL CAD: CPT | Mod: TC,PO

## 2017-12-01 PROCEDURE — 77063 BREAST TOMOSYNTHESIS BI: CPT | Mod: 26,,, | Performed by: RADIOLOGY

## 2017-12-01 PROCEDURE — 77067 SCR MAMMO BI INCL CAD: CPT | Mod: 26,,, | Performed by: RADIOLOGY

## 2017-12-04 ENCOUNTER — OFFICE VISIT (OUTPATIENT)
Dept: NEPHROLOGY | Facility: CLINIC | Age: 62
End: 2017-12-04
Payer: COMMERCIAL

## 2017-12-04 VITALS
DIASTOLIC BLOOD PRESSURE: 84 MMHG | HEIGHT: 63 IN | WEIGHT: 151.88 LBS | HEART RATE: 88 BPM | SYSTOLIC BLOOD PRESSURE: 142 MMHG | BODY MASS INDEX: 26.91 KG/M2

## 2017-12-04 DIAGNOSIS — N18.30 CHRONIC KIDNEY DISEASE (CKD), STAGE III (MODERATE): Primary | ICD-10-CM

## 2017-12-04 PROCEDURE — 99214 OFFICE O/P EST MOD 30 MIN: CPT | Mod: S$GLB,,, | Performed by: INTERNAL MEDICINE

## 2017-12-04 PROCEDURE — 99999 PR PBB SHADOW E&M-EST. PATIENT-LVL III: CPT | Mod: PBBFAC,,, | Performed by: INTERNAL MEDICINE

## 2017-12-04 NOTE — PROGRESS NOTES
Subjective:       Patient ID: Kacy Blackwell is a 62 y.o.  female who presents for follow-up evaluation of HTN , CKD stage 3      Gurinder Pierce MD       HPI: Ms Kacy Blackwell is a 62 -year-old AA woman seen in office today in f/u  for above medical problems .  I saw her in clinic about 3 months ago.  Patient was eval by  endocrinology for  primary hyperaldosteronism.  Serum renin/Ezra ratio is significantly elevated.  Next up in the workup include adrenal vein sampling, CT scan of the abdomen shows normal adrenal glands.  Patient did not want to proceed with any invasive testing and wanted to continue management of her condition with medications alone.  Her blood pressure is controlled on metoprolol and triamterene.  Her mother also has hypertension and is on about 8 blood pressure medications, she is 95.   Patient has chronic hypokalemia dating back to 2009. She was started on potassium supplements.  Patient was initially started on Aldactone. Later  switched to triamterene secondary to concerns of side effects of aldactone, she has been tolerating Triamterene well,        Past Medical History:   Diagnosis Date    Chronic cough     controlled w flonase/zyrtec    Hyperaldosteronism     dr cummings    Hyperlipidemia     Hypertension     Hypokalemia     Osteoporosis 12/16 naeem 12/18    Seasonal allergies        Current Outpatient Prescriptions on File Prior to Visit   Medication Sig Dispense Refill    alendronate (FOSAMAX) 70 MG tablet Take 1 tablet once weekly in the morning with a full glass of water on an empty stomach. Do not lie down for at least 30 minutes afterwards. 12 tablet 3    aspirin 81 MG Chew Take 81 mg by mouth once daily.      BIOTIN ORAL Take 10,000 mg by mouth once daily.       calcium carbonate-vit D3-min (CALTRATE 600+D PLUS MINERALS) 600 mg calcium- 400 unit Tab Take 1 tablet by mouth once daily.      fexofenadine (ALLEGRA) 180 MG tablet Take 1 tablet (180  mg total) by mouth once daily. 30 tablet 11    metoprolol succinate (TOPROL-XL) 50 MG 24 hr tablet Take 1 tablet (50 mg total) by mouth once daily. 90 tablet 3    multivitamin capsule Take 1 capsule by mouth once daily.      peg 400-propylene glycol, PF, (SYSTANE, PF,) 0.4-0.3 % Dpet Apply to eye.      rosuvastatin (CRESTOR) 5 MG tablet TAKE ONE TABLET BY MOUTH AT BEDTIME 30 tablet 9    triamterene (DYRENIUM) 100 MG Cap Take 1 capsule (100 mg total) by mouth once daily. 90 capsule 3    zolpidem (AMBIEN) 5 MG Tab Take 1 tablet (5 mg total) by mouth nightly as needed. 30 tablet 0    potassium chloride SA (K-DUR,KLOR-CON) 20 MEQ tablet TAKE ONE TABLET BY MOUTH TWICE DAILY 60 tablet 3    [DISCONTINUED] potassium chloride SA (K-DUR,KLOR-CON) 20 MEQ tablet TAKE ONE TABLET BY MOUTH TWICE DAILY 60 tablet 4     No current facility-administered medications on file prior to visit.              Review of Systems   Constitutional: Negative for activity change, appetite change, fatigue and fever.   HENT: Negative for congestion, facial swelling, sore throat, trouble swallowing and voice change.    Eyes: Negative for redness and visual disturbance.   Respiratory: Negative for apnea, cough, chest tightness, shortness of breath and wheezing.    Cardiovascular: Negative for chest pain, palpitations and leg swelling.   Gastrointestinal: Negative for abdominal distention, abdominal pain, blood in stool, constipation, diarrhea, nausea and vomiting.   Genitourinary: Negative for decreased urine volume, difficulty urinating, dysuria, flank pain, frequency, hematuria, pelvic pain and urgency.   Musculoskeletal: Negative for back pain, gait problem and joint swelling.   Skin: Negative for color change and rash.   Neurological: Negative for dizziness, syncope, weakness and headaches.   Hematological: Does not bruise/bleed easily.   Psychiatric/Behavioral: Negative for agitation, behavioral problems and confusion. The patient is not  nervous/anxious.      :            Objective:         Vitals:    12/04/17 1338   BP: (!) 142/84   Pulse: 88       Respiratory rate 20, afebrile, weight 151 pounds, previous weight was 155 pounds      Physical Exam  :      Constitutional: She is oriented to person, place, and time. She appears well-developed and well-nourished. No distress.   HENT: Head: Normocephalic and atraumatic.   Mouth/Throat: Oropharynx is clear and moist. No oropharyngeal exudate.   Eyes: Conjunctivae normal and EOM are normal. Pupils are equal, round, and reactive to light. No scleral icterus.   Neck: Normal range of motion. Neck supple. No JVD present. Carotid bruit is not present. No tracheal deviation present. No mass and no thyromegaly present.   Cardiovascular: Normal rate, regular rhythm, normal heart sounds and intact distal pulses. Exam reveals no gallop and no friction rub.   No murmur heard.  Pulmonary/Chest: Effort normal and breath sounds normal. No respiratory distress. She has no wheezes. She has no rales. She exhibits no tenderness.   Abdominal: Soft. Bowel sounds are normal. She exhibits no distension, no abdominal bruit, no ascites and no mass. There is no hepatosplenomegaly. There is no tenderness. There is no rebound, no guarding and no CVA tenderness.   Musculoskeletal: Normal range of motion. She exhibits no edema and no tenderness.   Lymphadenopathy: She has no cervical adenopathy.   Neurological: She is alert and oriented to person, place, and time. She has normal reflexes. No cranial nerve deficit. She exhibits normal muscle tone. Coordination normal.   Skin: Skin is warm and intact. No rash noted. No erythema. No pallor.   Psychiatric: She has a normal mood and affect. Her behavior is normal. Judgment normal.         Labs:    Lab Results   Component Value Date    CREATININE 1.2 12/01/2017    BUN 18 12/01/2017     12/01/2017    K 3.4 (L) 12/01/2017     12/01/2017    CO2 31 (H) 12/01/2017       Lab Results    Component Value Date    WBC 7.54 09/21/2017    HGB 11.7 (L) 09/21/2017    HCT 35.5 (L) 09/21/2017    MCV 89 09/21/2017     09/21/2017       Lab Results   Component Value Date    CALCIUM 10.1 12/01/2017    PHOS 3.4 12/01/2017       Lab Results   Component Value Date    ALBUMIN 3.7 12/01/2017       Lab Results   Component Value Date    URICACID 5.9 (H) 12/01/2017          62 -year-old  woman seen office today in f/u for following medical problems      1.  Hypertension - her blood pressure is controlled,  Continue current regimen.  Discussed low-salt diet.      2.  Primary hyperaldosteronism. CAT scan of the abdomen shows normal adrenal glands. Continue triamtrene 100 mg daily. Also she is on potassium chloride 20 mEq twice a day.    patient did not want to proceed with adrenal vein sampling,     3. CKD stage 3 : Stable renal function with a serum creatinine of 1.2 mg/dL, advised patient to avoid NSAID use.     4.  Recent urinalysis shows normal range proteinuria.    5.  Hypokalemia - continue potassium supplements and triamterene, discussed potassium rich diet,     We'll see her in followup in about 6 months.   more than 25 minutes of face-to-face time was spent with the patient discussing labs and plan of care.      Sincerely,      Cliff Barrera M.D.

## 2017-12-04 NOTE — PATIENT INSTRUCTIONS
Avoid NSAID pain medications such as advil, aleve, motrin, ibuprofen, naprosyn, meloxicam, diclofenac, mobic.     Potassium rich as discussed       Low-Salt Diet:    This diet removes foods that are high in salt. It also limits the amount of salt you use when cooking. It is most often used for people with high blood pressure, edema (fluid retention), and kidney, liver, or heart disease.  Table salt contains the mineral sodium. Your body needs sodium to work normally. But too much sodium can make your health problems worse. Your healthcare provider is recommending a low-salt (also called low-sodium) diet for you. Your total daily allowance of salt is 1,500 to 2,300 milligrams (mg). It is less than 1 teaspoon of table salt. This means you can have only about 500 to 700 mg of sodium at each meal. People with certain health problems should limit salt intake to the lower end of the recommended range.    When you cook, dont add much salt. If you can cook without using salt, even better. Dont add salt to your food at the table.  When shopping, read food labels. Salt is often called sodium on the label. Choose foods that are salt-free, low salt, or very low salt. Note that foods with reduced salt may not lower your salt intake enough.    Beans, potatoes, and pasta  Ok: Dry beans, split peas, lentils, potatoes, rice, macaroni, pasta, spaghetti without added salt  Avoid: Potato chips, tortilla chips, and similar products  Breads and cereals  Ok: Low-sodium breads, rolls, cereals, and cakes; low-salt crackers, matzo crackers  Avoid: Salted crackers, pretzels, popcorn, Faroese toast, pancakes, muffins  Dairy  Ok: Milk, chocolate milk, hot chocolate mix, low-salt cheeses, and yogurt  Avoid: Processed cheese and cheese spreads; Roquefort, Camembert, and cottage cheese; buttermilk, instant breakfast drink  Desserts  Ok: Ice cream, frozen yogurt, juice bars, gelatin, cookies and pies, sugar, honey, jelly, hard candy  Avoid: Most  pies, cakes and cookies prepared or processed with salt; instant pudding  Drinks  Ok: Tea, coffee, fizzy (carbonated) drinks, juices  Avoid: Flavored coffees, electrolyte replacement drinks, sports drinks  Meats  Ok: All fresh meat, fish, poultry, low-salt tuna, eggs, egg substitute  Avoid: Smoked, pickled, brine-cured, or salted meats and fish. This includes kramer, chipped beef, corned beef, hot dogs, deli meats, ham, kosher meats, salt pork, sausage, canned tuna, salted codfish, smoked salmon, herring, sardines, or anchovies.  Seasonings and spices  Ok: Most seasonings are okay. Good substitutes for salt include: fresh herb blends, hot sauce, lemon, garlic, finch, vinegar, dry mustard, parsley, cilantro, horseradish, tomato paste, regular margarine, mayonnaise, unsalted butter, cream cheese, vegetable oil, cream, low-salt salad dressing and gravy.  Avoid: Regular ketchup, relishes, pickles, soy sauce, teriyaki sauce, Worcestershire sauce, BBQ sauce, tartar sauce, meat tenderizer, chili sauce, regular gravy, regular salad dressing, salted butter  Soups  Ok: Low-salt soups and broths made with allowed foods  Avoid: Bouillon cubes, soups with smoked or salted meats, regular soup and broth  Vegetables  Ok: Most vegetables are okay; also low-salt tomato and vegetable juices  Avoid: Sauerkraut and other brine-soaked vegetables; pickles and other pickled vegetables; tomato juice, olives  Date Last Reviewed: 8/1/2016  © 2403-7940 [employer]. [address] [place] [address], [place] [address]. All rights reserved. This information is not intended as a substitute for professional medical care. Always follow your healthcare professional's instructions.

## 2017-12-05 ENCOUNTER — PATIENT MESSAGE (OUTPATIENT)
Dept: NEPHROLOGY | Facility: CLINIC | Age: 62
End: 2017-12-05

## 2018-02-06 RX ORDER — ALENDRONATE SODIUM 70 MG/1
TABLET ORAL
Qty: 12 TABLET | Refills: 2 | Status: SHIPPED | OUTPATIENT
Start: 2018-02-06 | End: 2018-12-06

## 2018-02-19 ENCOUNTER — TELEPHONE (OUTPATIENT)
Dept: INTERNAL MEDICINE | Facility: CLINIC | Age: 63
End: 2018-02-19

## 2018-02-19 DIAGNOSIS — Z88.9 MULTIPLE ALLERGIES: Primary | ICD-10-CM

## 2018-02-22 ENCOUNTER — OFFICE VISIT (OUTPATIENT)
Dept: ALLERGY | Facility: CLINIC | Age: 63
End: 2018-02-22
Payer: COMMERCIAL

## 2018-02-22 ENCOUNTER — LAB VISIT (OUTPATIENT)
Dept: LAB | Facility: HOSPITAL | Age: 63
End: 2018-02-22
Attending: ALLERGY & IMMUNOLOGY
Payer: COMMERCIAL

## 2018-02-22 VITALS
HEART RATE: 74 BPM | BODY MASS INDEX: 27.27 KG/M2 | RESPIRATION RATE: 15 BRPM | DIASTOLIC BLOOD PRESSURE: 70 MMHG | SYSTOLIC BLOOD PRESSURE: 104 MMHG | HEIGHT: 63 IN | TEMPERATURE: 97 F | WEIGHT: 153.88 LBS

## 2018-02-22 DIAGNOSIS — R05.9 COUGH: ICD-10-CM

## 2018-02-22 DIAGNOSIS — R05.9 COUGH: Primary | ICD-10-CM

## 2018-02-22 LAB
BASOPHILS # BLD AUTO: 0.01 K/UL
BASOPHILS NFR BLD: 0.1 %
CRP SERPL-MCNC: 3.2 MG/L
DIFFERENTIAL METHOD: ABNORMAL
EOSINOPHIL # BLD AUTO: 0.1 K/UL
EOSINOPHIL NFR BLD: 2 %
ERYTHROCYTE [DISTWIDTH] IN BLOOD BY AUTOMATED COUNT: 14.2 %
ERYTHROCYTE [SEDIMENTATION RATE] IN BLOOD BY WESTERGREN METHOD: 27 MM/HR
HCT VFR BLD AUTO: 40.5 %
HGB BLD-MCNC: 12.8 G/DL
IGA SERPL-MCNC: 256 MG/DL
IGE SERPL-ACNC: 53 IU/ML
IGG SERPL-MCNC: 1002 MG/DL
IGM SERPL-MCNC: 131 MG/DL
IMM GRANULOCYTES # BLD AUTO: 0.02 K/UL
IMM GRANULOCYTES NFR BLD AUTO: 0.3 %
LYMPHOCYTES # BLD AUTO: 1.8 K/UL
LYMPHOCYTES NFR BLD: 26.6 %
MCH RBC QN AUTO: 29 PG
MCHC RBC AUTO-ENTMCNC: 31.6 G/DL
MCV RBC AUTO: 92 FL
MONOCYTES # BLD AUTO: 0.4 K/UL
MONOCYTES NFR BLD: 6.1 %
NEUTROPHILS # BLD AUTO: 4.5 K/UL
NEUTROPHILS NFR BLD: 64.9 %
NRBC BLD-RTO: 0 /100 WBC
PLATELET # BLD AUTO: 286 K/UL
PMV BLD AUTO: 10.5 FL
RBC # BLD AUTO: 4.41 M/UL
WBC # BLD AUTO: 6.88 K/UL

## 2018-02-22 PROCEDURE — 99999 PR PBB SHADOW E&M-EST. PATIENT-LVL III: CPT | Mod: PBBFAC,,, | Performed by: ALLERGY & IMMUNOLOGY

## 2018-02-22 PROCEDURE — 86140 C-REACTIVE PROTEIN: CPT

## 2018-02-22 PROCEDURE — 86162 COMPLEMENT TOTAL (CH50): CPT

## 2018-02-22 PROCEDURE — 84165 PROTEIN E-PHORESIS SERUM: CPT | Mod: 26,,, | Performed by: PATHOLOGY

## 2018-02-22 PROCEDURE — 86774 TETANUS ANTIBODY: CPT

## 2018-02-22 PROCEDURE — 86331 IMMUNODIFFUSION OUCHTERLONY: CPT | Mod: 91

## 2018-02-22 PROCEDURE — 86255 FLUORESCENT ANTIBODY SCREEN: CPT

## 2018-02-22 PROCEDURE — 82787 IGG 1 2 3 OR 4 EACH: CPT

## 2018-02-22 PROCEDURE — 86003 ALLG SPEC IGE CRUDE XTRC EA: CPT

## 2018-02-22 PROCEDURE — 84165 PROTEIN E-PHORESIS SERUM: CPT

## 2018-02-22 PROCEDURE — 85651 RBC SED RATE NONAUTOMATED: CPT

## 2018-02-22 PROCEDURE — 86003 ALLG SPEC IGE CRUDE XTRC EA: CPT | Mod: 59

## 2018-02-22 PROCEDURE — 85025 COMPLETE CBC W/AUTO DIFF WBC: CPT

## 2018-02-22 PROCEDURE — 3008F BODY MASS INDEX DOCD: CPT | Mod: S$GLB,,, | Performed by: ALLERGY & IMMUNOLOGY

## 2018-02-22 PROCEDURE — 99204 OFFICE O/P NEW MOD 45 MIN: CPT | Mod: 25,S$GLB,, | Performed by: ALLERGY & IMMUNOLOGY

## 2018-02-22 PROCEDURE — 82785 ASSAY OF IGE: CPT

## 2018-02-22 PROCEDURE — 86038 ANTINUCLEAR ANTIBODIES: CPT

## 2018-02-22 PROCEDURE — 82784 ASSAY IGA/IGD/IGG/IGM EACH: CPT

## 2018-02-22 PROCEDURE — 36415 COLL VENOUS BLD VENIPUNCTURE: CPT

## 2018-02-22 PROCEDURE — 96372 THER/PROPH/DIAG INJ SC/IM: CPT | Mod: S$GLB,,, | Performed by: ALLERGY & IMMUNOLOGY

## 2018-02-22 RX ORDER — METHYLPREDNISOLONE ACETATE 40 MG/ML
40 INJECTION, SUSPENSION INTRA-ARTICULAR; INTRALESIONAL; INTRAMUSCULAR; SOFT TISSUE ONCE
Status: COMPLETED | OUTPATIENT
Start: 2018-02-22 | End: 2018-02-22

## 2018-02-22 RX ADMIN — METHYLPREDNISOLONE ACETATE 40 MG: 40 INJECTION, SUSPENSION INTRA-ARTICULAR; INTRALESIONAL; INTRAMUSCULAR; SOFT TISSUE at 11:02

## 2018-02-23 LAB
ALBUMIN SERPL ELPH-MCNC: 4.56 G/DL
ALPHA1 GLOB SERPL ELPH-MCNC: 0.35 G/DL
ALPHA2 GLOB SERPL ELPH-MCNC: 0.89 G/DL
ANA SER QL IF: NORMAL
B-GLOBULIN SERPL ELPH-MCNC: 0.98 G/DL
GAMMA GLOB SERPL ELPH-MCNC: 1.03 G/DL
PATHOLOGIST INTERPRETATION SPE: NORMAL
PROT SERPL-MCNC: 7.8 G/DL

## 2018-02-23 NOTE — PROGRESS NOTES
Chief complaint: Cough    This note was dictated using voice recognition software and may contain errors.    History:    She had a 10 AM appointment on .  Information in her medical record regarding her past medical history family history and social history was read reviewed and updated today.  Significant additions if any are as noted below.    She had an appointment with me in 2013.  The CT scan of the paranasal sinuses was performed in 2013 and did not demonstrate significant paranasal sinus disease at that time.  Her last chest x-ray was performed May 2017.  The report was reviewed while she was here today.    Diagnostic immediate hypersensitivity skin tests were performed at the time of her appointment with me in 2013.  1 house dust mites reacted positively.  The other 35 airborne allergens tested were negative.  We reviewed the results of those tests today.    She stated that she continues to experience the cough intermittently.  She stated for a period of time she thought Singulair was of some benefit.  It ceased to be helpful.  When she is taken oral prednisone she is not experienced the same degree of improvement in her coughing compared to those times when she is received corticosteroids administered by intramuscular injection.  Her cough has been of such a troublesome nature recently she is requesting treatment with an intramuscular injection of corticosteroid today.    Family history is positive for asthma and lung cancer.    Review of systems: She stated that her cough can awaken her from sleep.  The cough can be productive of sputum.  Usually sputum production is minimal and is usually clear.    Social history: At the time of her appointment with me in 2013 she informed me that her  suffered from cerebellar atrophy.  She stated he has .    Exam:    In general she is in no distress.  She is alert oriented well-developed in good  mood and attentive  Gait steady  Skin no rash noted  Head no swelling noted  Eyes sclera white conjunctiva pink  Nose patent no polyps seen  Mouth no inflammation or swelling of the lips tongue or in the throat noted  Ears not inflamed tympanic membranes not inflamed  Neck no masses or thyromegaly noted  Lymph nodes no cervical or epitrochlear lymphadenopathy noted no supraclavicular lymphadenopathy noted  Lungs clear to auscultation no wheezing heard  Heart regular rhythm no murmurs heard  Extremities no inflammation or swelling of the hands or legs noted    Impression:    #1 cough, exact cause uncertain  #2 other health concerns as noted in her medical record    Assessment and plan:    I have suggested that diagnostic laboratory tests be performed initially.  This is agreeable with her.  Arrangements were made to have blood drawn after her appointment with me today.  When the results of the tests are available to review with her I will do so.  Additional recommendations may be made at that time.    I have recommended that complete pulmonary function tests be performed.  This appointment will be scheduled at a time that is convenient for her.    It may be reasonable to consider additional diagnostic tests including repeat CT scan of the sinuses to be certain that she is not developed sinusitis which could be causing her cough.  It may be reasonable to consider obtaining a CT scan of the chest to evaluate for lung conditions.  It would be important not to overlooked problems such as bronchiectasis.  Depending upon the results of diagnostic tests, obtaining pulmonary consultation may be considered.    Laboratory tests ordered today include measurement of total IgE, and specific IgE directed against 2 different house dust mites and Aspergillus.  Quantitative immunoglobulin levels will be measured along with other diagnostic tests.    At her request, while she was here today, she received an intramuscular injection of  Depo-Medrol 40 mg administered by my nurse.  She remained for 15 minutes after receiving the injection.  She tolerated it.    She was given the office phone number.  Should she have additional questions or concerns she was instructed to call.    Her appointment was 50 minutes in duration spent entirely in face-to-face contact.  More than 50% of the visit was spent in counseling and coordination of care

## 2018-02-24 LAB
IGG1 SER-MCNC: 527 MG/DL
IGG2 SER-MCNC: 332 MG/DL
IGG3 SER-MCNC: 71 MG/DL
IGG4 SER-MCNC: 75 MG/DL

## 2018-02-26 LAB
A FUMIGATUS IGE QN: <0.35 KU/L
ANCA AB TITR SER IF: NORMAL TITER
CH50 SERPL-ACNC: 80 U/ML
D FARINAE IGE QN: 3.06 KU/L
D PTERONYSS IGE QN: 0.47 KU/L
DEPRECATED A FUMIGATUS IGE RAST QL: NORMAL
DEPRECATED D FARINAE IGE RAST QL: ABNORMAL
DEPRECATED D PTERONYSS IGE RAST QL: ABNORMAL
P-ANCA TITR SER IF: NORMAL TITER

## 2018-02-28 LAB
A FUMIGATUS1 AB SER QL ID: DETECTED
A FUMIGATUS6 AB SER QL ID: ABNORMAL
A PULLULANS AB SER QL ID: ABNORMAL
PIGEON SERUM AB QL ID: ABNORMAL
S RECTIVIRGULA AB SER QL ID: ABNORMAL
T VULGARIS1 AB SER QL ID: ABNORMAL

## 2018-03-13 ENCOUNTER — TELEPHONE (OUTPATIENT)
Dept: ALLERGY | Facility: CLINIC | Age: 63
End: 2018-03-13

## 2018-03-13 NOTE — TELEPHONE ENCOUNTER
I spoke with her on the telephone on Tuesday, March 13, 2018.  We completed our telephone conversation at 2 PM.  I reviewed with her results of laboratory tests available to review at this time.  When the results of her pulmonary function tests are available to review with her I will contact her and do so.    This note was dictated using voice recognition software and may contain errors.

## 2018-03-19 RX ORDER — ZOLPIDEM TARTRATE 5 MG/1
5 TABLET ORAL NIGHTLY PRN
Qty: 30 TABLET | Refills: 0 | Status: SHIPPED | OUTPATIENT
Start: 2018-03-19 | End: 2018-09-13 | Stop reason: SDUPTHER

## 2018-03-20 ENCOUNTER — PROCEDURE VISIT (OUTPATIENT)
Dept: PULMONOLOGY | Facility: CLINIC | Age: 63
End: 2018-03-20
Payer: COMMERCIAL

## 2018-03-20 DIAGNOSIS — R05.9 COUGH: ICD-10-CM

## 2018-03-20 PROCEDURE — 94060 EVALUATION OF WHEEZING: CPT | Mod: S$GLB,,, | Performed by: INTERNAL MEDICINE

## 2018-03-20 PROCEDURE — 94729 DIFFUSING CAPACITY: CPT | Mod: S$GLB,,, | Performed by: INTERNAL MEDICINE

## 2018-03-20 PROCEDURE — 94726 PLETHYSMOGRAPHY LUNG VOLUMES: CPT | Mod: S$GLB,,, | Performed by: INTERNAL MEDICINE

## 2018-03-22 RX ORDER — POTASSIUM CHLORIDE 20 MEQ/1
TABLET, EXTENDED RELEASE ORAL
Qty: 60 TABLET | Refills: 11 | Status: SHIPPED | OUTPATIENT
Start: 2018-03-22 | End: 2019-04-12 | Stop reason: SDUPTHER

## 2018-03-27 LAB
C DIPHTHERIAE AB SER IA-ACNC: 1.33 IU/ML
C TETANI AB SER-ACNC: 5.68 IU/ML
DEPRECATED S PNEUM 1 IGG SER-MCNC: 0.8 MCG/ML
DEPRECATED S PNEUM12 IGG SER-MCNC: <0.3 MCG/ML
DEPRECATED S PNEUM14 IGG SER-MCNC: 1.3 MCG/ML
DEPRECATED S PNEUM19 IGG SER-MCNC: 0.4 MCG/ML
DEPRECATED S PNEUM23 IGG SER-MCNC: <0.3 MCG/ML
DEPRECATED S PNEUM3 IGG SER-MCNC: 0.3 MCG/ML
DEPRECATED S PNEUM4 IGG SER-MCNC: <0.3 MCG/ML
DEPRECATED S PNEUM5 IGG SER-MCNC: 2.4 MCG/ML
DEPRECATED S PNEUM8 IGG SER-MCNC: <0.3 MCG/ML
DEPRECATED S PNEUM9 IGG SER-MCNC: 1.1 MCG/ML
HAEM INFLU B IGG SER-MCNC: NORMAL MG/L
S PNEUM DA 18C IGG SER-MCNC: 0.6 MCG/ML
S PNEUM DA 6B IGG SER-MCNC: <0.3 MCG/ML
S PNEUM DA 7F IGG SER-MCNC: <0.3 MCG/ML
S PNEUM DA 9V IGG SER-MCNC: 1 MCG/ML

## 2018-04-06 DIAGNOSIS — I10 HTN (HYPERTENSION), BENIGN: ICD-10-CM

## 2018-04-06 RX ORDER — TRIAMTERENE CAPSULES 100 MG/1
100 CAPSULE ORAL DAILY
Qty: 90 CAPSULE | Refills: 3 | Status: SHIPPED | OUTPATIENT
Start: 2018-04-06 | End: 2019-01-08 | Stop reason: ALTCHOICE

## 2018-04-08 RX ORDER — METOPROLOL SUCCINATE 50 MG/1
50 TABLET, EXTENDED RELEASE ORAL DAILY
Qty: 90 TABLET | Refills: 0 | Status: SHIPPED | OUTPATIENT
Start: 2018-04-08 | End: 2018-07-06 | Stop reason: SDUPTHER

## 2018-04-25 RX ORDER — ROSUVASTATIN CALCIUM 5 MG/1
5 TABLET, COATED ORAL NIGHTLY
Qty: 30 TABLET | Refills: 11 | Status: SHIPPED | OUTPATIENT
Start: 2018-04-25 | End: 2019-05-03 | Stop reason: SDUPTHER

## 2018-04-26 DIAGNOSIS — Z00.00 ROUTINE GENERAL MEDICAL EXAMINATION AT A HEALTH CARE FACILITY: Primary | ICD-10-CM

## 2018-05-07 ENCOUNTER — CLINICAL SUPPORT (OUTPATIENT)
Dept: CARDIOLOGY | Facility: CLINIC | Age: 63
End: 2018-05-07
Payer: COMMERCIAL

## 2018-05-07 ENCOUNTER — OFFICE VISIT (OUTPATIENT)
Dept: INTERNAL MEDICINE | Facility: CLINIC | Age: 63
End: 2018-05-07
Payer: COMMERCIAL

## 2018-05-07 ENCOUNTER — CLINICAL SUPPORT (OUTPATIENT)
Dept: INTERNAL MEDICINE | Facility: CLINIC | Age: 63
End: 2018-05-07
Payer: COMMERCIAL

## 2018-05-07 ENCOUNTER — PATIENT MESSAGE (OUTPATIENT)
Dept: INTERNAL MEDICINE | Facility: CLINIC | Age: 63
End: 2018-05-07

## 2018-05-07 ENCOUNTER — TELEPHONE (OUTPATIENT)
Dept: ALLERGY | Facility: CLINIC | Age: 63
End: 2018-05-07

## 2018-05-07 VITALS
SYSTOLIC BLOOD PRESSURE: 124 MMHG | HEART RATE: 72 BPM | SYSTOLIC BLOOD PRESSURE: 124 MMHG | HEART RATE: 72 BPM | RESPIRATION RATE: 14 BRPM | HEIGHT: 63 IN | WEIGHT: 155.19 LBS | WEIGHT: 155.19 LBS | OXYGEN SATURATION: 98 % | DIASTOLIC BLOOD PRESSURE: 80 MMHG | BODY MASS INDEX: 27.5 KG/M2 | DIASTOLIC BLOOD PRESSURE: 80 MMHG | RESPIRATION RATE: 14 BRPM | TEMPERATURE: 96 F | HEIGHT: 63 IN | BODY MASS INDEX: 27.5 KG/M2

## 2018-05-07 DIAGNOSIS — M89.9 BONE DISORDER: ICD-10-CM

## 2018-05-07 DIAGNOSIS — Z00.00 ROUTINE GENERAL MEDICAL EXAMINATION AT A HEALTH CARE FACILITY: Primary | ICD-10-CM

## 2018-05-07 DIAGNOSIS — Z00.00 ROUTINE HEALTH MAINTENANCE: Primary | ICD-10-CM

## 2018-05-07 DIAGNOSIS — R05.9 COUGH: Primary | ICD-10-CM

## 2018-05-07 DIAGNOSIS — Z00.00 ROUTINE GENERAL MEDICAL EXAMINATION AT A HEALTH CARE FACILITY: ICD-10-CM

## 2018-05-07 LAB
ALBUMIN SERPL BCP-MCNC: 3.9 G/DL
ALP SERPL-CCNC: 71 U/L
ALT SERPL W/O P-5'-P-CCNC: 30 U/L
ANION GAP SERPL CALC-SCNC: 10 MMOL/L
AST SERPL-CCNC: 27 U/L
BILIRUB SERPL-MCNC: 0.7 MG/DL
BUN SERPL-MCNC: 20 MG/DL
CALCIUM SERPL-MCNC: 9.8 MG/DL
CHLORIDE SERPL-SCNC: 107 MMOL/L
CHOLEST SERPL-MCNC: 166 MG/DL
CHOLEST/HDLC SERPL: 2.9 {RATIO}
CO2 SERPL-SCNC: 27 MMOL/L
CREAT SERPL-MCNC: 1.2 MG/DL
ERYTHROCYTE [DISTWIDTH] IN BLOOD BY AUTOMATED COUNT: 14.2 %
EST. GFR  (AFRICAN AMERICAN): 56 ML/MIN/1.73 M^2
EST. GFR  (NON AFRICAN AMERICAN): 48 ML/MIN/1.73 M^2
GLUCOSE SERPL-MCNC: 109 MG/DL
HCT VFR BLD AUTO: 38.8 %
HDLC SERPL-MCNC: 58 MG/DL
HDLC SERPL: 34.9 %
HGB BLD-MCNC: 12.5 G/DL
LDLC SERPL CALC-MCNC: 90 MG/DL
MCH RBC QN AUTO: 29.6 PG
MCHC RBC AUTO-ENTMCNC: 32.2 G/DL
MCV RBC AUTO: 92 FL
NONHDLC SERPL-MCNC: 108 MG/DL
PLATELET # BLD AUTO: 266 K/UL
PMV BLD AUTO: 10.5 FL
POST FEF 25 75: 1.86 L/S (ref 1.17–2.45)
POST FET 100: 16.62 S
POST FEV1 FVC: 79 %
POST FEV1: 1.88 L (ref 1.54–2.11)
POST FIF 50: 4.73 L/S
POST FVC: 2.38 L (ref 2–2.67)
POST PEF: 5.47 L/S (ref 4.11–5.98)
POTASSIUM SERPL-SCNC: 3.7 MMOL/L
PRE DLCO: 13.04 ML/MMHG/MIN (ref 15.57–23.86)
PRE ERV: 0.54 L
PRE FEF 25 75: 1.69 L/S (ref 1.17–2.45)
PRE FET 100: 14.28 S
PRE FEV1 FVC: 77 %
PRE FEV1: 1.88 L (ref 1.54–2.11)
PRE FIF 50: 3.14 L/S
PRE FRC PL: 3.55 L (ref 1.95–2.9)
PRE FVC: 2.43 L (ref 2–2.67)
PRE KROGHS K: 3.48 1/MIN
PRE PEF: 5.59 L/S (ref 4.11–5.98)
PRE RV: 2.9 L (ref 1.36–2.06)
PRE SVC: 2.45 L
PRE TLC: 5.35 L (ref 4.08–4.85)
PREDICTED DLCO: 19.71 ML/MMHG/MIN (ref 15.57–23.86)
PREDICTED FEV1 FVC: 77.42 % (ref 72.52–82.32)
PREDICTED FEV1: 1.82 L (ref 1.54–2.11)
PREDICTED FRC N2: 2.43 L (ref 1.95–2.9)
PREDICTED FRC PL: 2.43 L (ref 1.95–2.9)
PREDICTED FVC: 2.34 L (ref 2–2.67)
PREDICTED RV: 1.71 L (ref 1.36–2.06)
PREDICTED SVC: 2.71 L
PREDICTED TLC: 4.47 L (ref 4.08–4.85)
PROT SERPL-MCNC: 7.1 G/DL
PROVOCATION PROTOCOL: ABNORMAL
RBC # BLD AUTO: 4.23 M/UL
SODIUM SERPL-SCNC: 144 MMOL/L
TRIGL SERPL-MCNC: 90 MG/DL
WBC # BLD AUTO: 6 K/UL

## 2018-05-07 PROCEDURE — 85027 COMPLETE CBC AUTOMATED: CPT | Mod: PO

## 2018-05-07 PROCEDURE — 80061 LIPID PANEL: CPT | Mod: PO

## 2018-05-07 PROCEDURE — 80053 COMPREHEN METABOLIC PANEL: CPT | Mod: PO

## 2018-05-07 PROCEDURE — 93000 ELECTROCARDIOGRAM COMPLETE: CPT | Mod: S$GLB,,, | Performed by: NUCLEAR MEDICINE

## 2018-05-07 PROCEDURE — 36415 COLL VENOUS BLD VENIPUNCTURE: CPT | Mod: PO

## 2018-05-07 PROCEDURE — 99999 PR PBB SHADOW E&M-EST. PATIENT-LVL III: CPT | Mod: PBBFAC,,, | Performed by: FAMILY MEDICINE

## 2018-05-07 PROCEDURE — 99396 PREV VISIT EST AGE 40-64: CPT | Mod: S$GLB,,, | Performed by: FAMILY MEDICINE

## 2018-05-07 RX ORDER — CETIRIZINE HYDROCHLORIDE 10 MG/1
10 TABLET ORAL DAILY
COMMUNITY

## 2018-05-07 NOTE — PROGRESS NOTES
Subjective:       Patient ID: Kacy Blackwell is a . female.    Chief Complaint: exec phys  HPIhere for exec phys;   Hypertensionhyperalod utd specialist;saw castro haile and bx deferred: blood pressures at home normal. Tolerating medicine.   osteopor shad fosamax      Insomnia hx prn ambien:#21 lasts 5 months not due  hyperchol shad med    Chronic cough x yrs. Saw dr chambers note reviewed;saw pulm some yrs ago. Not as bad now as when saw allergist but gradually returning    Past Medical History:   Diagnosis Date    Chronic cough     controlled w flonase/zyrtec    Hyperaldosteronism     dr cummings    Hyperlipidemia     Hypertension     Hypokalemia     Osteoporosis 12/16 naeem 12/18    Seasonal allergies      Past Surgical History:   Procedure Laterality Date    cyst removed from head      HYSTERECTOMY  1980    cervix absent     Family History   Problem Relation Age of Onset    Hypertension Mother     Arthritis Mother     Diabetes Mother     Heart failure Mother     Hypertension Father     Arthritis Father     Cancer Sister     Hypertension Brother     Diabetes Brother     Stroke Brother     Hypertension Sister     Breast cancer Neg Hx     Colon cancer Neg Hx     Ovarian cancer Neg Hx      Social History     Social History    Marital status:      Spouse name: N/A    Number of children: N/A    Years of education: N/A     Social History Main Topics    Smoking status: Never Smoker    Smokeless tobacco: Never Used    Alcohol use Yes      Comment: rare social drinker    Drug use: No    Sexual activity: Yes     Partners: Male     Other Topics Concern    None     Social History Narrative    . 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 She appears well-developed and well-nourished. No distress.   HENT:   Head: Atraumatic.   Right Ear: External ear normal.   Left Ear: External ear normal.   Nose: Nose normal.   Mouth/Throat: Oropharynx is clear and moist. No oropharyngeal exudate.   bilat tms nl   Eyes: Conjunctivae and EOM are normal. Pupils are equal, round, and reactive to light. No scleral icterus.   Neck: Normal range of motion. Neck supple. No thyromegaly present.   Cardiovascular: Normal rate, regular rhythm and normal heart sounds.    No murmur heard.  Pulmonary/Chest: Effort normal and breath sounds normal. No respiratory distress. She has no wheezes. She has no rales.   Abdominal: Soft. Bowel sounds are normal. She exhibits no distension and no mass. There is no hepatosplenomegaly. There is no tenderness. There is no rebound and no guarding.   Musculoskeletal: Normal range of motion. She exhibits no edema or tenderness.   Lymphadenopathy:     She has no cervical adenopathy.   Neurological: She is alert and oriented to person, place, and time. No cranial nerve deficit. She exhibits normal muscle tone. Coordination normal.   Skin: Skin is warm. No rash noted. No erythema. No pallor.   Psychiatric: She has a normal mood and affect. Her behavior is normal. Judgment and thought content normal.   Nursing note and vitals reviewed.      Assessment:       1. Routine health maintenance    2. HTN (hypertension), benign /hyperaldost     Chronic cough  Plan:     f/u dr dick. When due    F/u 6month  **naeem tmst when ready  Letter pnd  msg to dr koch re:pft interpret: interpret sent "normal spirometry)  msg Dr Murphy next steps recommd. For chronic cough  "

## 2018-05-07 NOTE — Clinical Note
Dr Talbot, Dr Murphy ordered pfts about a month ago. She has not received result. I looked and dont see final interpretation. Were they normal?  Gurinder

## 2018-05-07 NOTE — Clinical Note
Dr Murphy I am seeing Mrs Blackwell. While cough is better it continues and she was awaiting pft results you ordered. I could not find final interpretation. I messaged Dr Talbot for this. She was asking if you had a plan for CT sinus etc if pfts were ok.  Gurinder

## 2018-05-07 NOTE — TELEPHONE ENCOUNTER
This note was dictated using voice recognition software and may contain errors.    I spoke with her on the telephone on Monday May 7, 2018.  We completed our conversation at 1:50 p.m..    Please refer to my previous notes from 2013 and 2018.    She stated that her cough is beginning to return.    The CT scan of the paranasal sinuses performed November 13, 2013 revealed some thickening of mucous membranes but no air-fluid levels.  I have recommended that a CT scan of the paranasal sinuses be performed at this time to document the presence or absence of sinus disease.  I reviewed with her that sinusitis and sinus disease may provoke coughing in some individuals.    Her last chest x-ray was performed May 15, 2017.  I recommended that a chest x-ray be repeated.    I reviewed with her the result of pulmonary function tests performed of this spring.    Once the results of the imaging studies noted above are known additional recommendations may be made.    I reviewed with her the consideration could be given to the performance of a methacholine challenge test and also a CT scan of the chest and also obtaining pulmonary consultation.    When the results of the diagnostic tests are known she will be informed.  My nurse will contact her and assist with the scheduling of the CT scan of the paranasal sinuses and the chest x-ray.

## 2018-05-11 ENCOUNTER — TELEPHONE (OUTPATIENT)
Dept: RADIOLOGY | Facility: HOSPITAL | Age: 63
End: 2018-05-11

## 2018-05-14 ENCOUNTER — HOSPITAL ENCOUNTER (OUTPATIENT)
Dept: RADIOLOGY | Facility: HOSPITAL | Age: 63
Discharge: HOME | End: 2018-05-14
Attending: ALLERGY & IMMUNOLOGY
Payer: COMMERCIAL

## 2018-05-14 ENCOUNTER — TELEPHONE (OUTPATIENT)
Dept: ALLERGY | Facility: CLINIC | Age: 63
End: 2018-05-14

## 2018-05-14 DIAGNOSIS — R05.9 COUGH: ICD-10-CM

## 2018-05-14 PROCEDURE — 71046 X-RAY EXAM CHEST 2 VIEWS: CPT | Mod: 26,,, | Performed by: RADIOLOGY

## 2018-05-14 PROCEDURE — 70486 CT MAXILLOFACIAL W/O DYE: CPT | Mod: 26,,, | Performed by: RADIOLOGY

## 2018-05-14 PROCEDURE — 71046 X-RAY EXAM CHEST 2 VIEWS: CPT | Mod: TC,FY,PO

## 2018-05-14 PROCEDURE — 70486 CT MAXILLOFACIAL W/O DYE: CPT | Mod: TC,PO

## 2018-05-14 NOTE — TELEPHONE ENCOUNTER
I called her on Monday he May 14, 2018 at 5:55 a.m. PM.  On I called her to review with her the reports of her chest x-ray and CT scan of the paranasal sinuses.  These diagnostic tests were performed today.  Please refer to the reports.    This note was dictated using voice recognition software and may contain errors.

## 2018-06-18 ENCOUNTER — LAB VISIT (OUTPATIENT)
Dept: LAB | Facility: HOSPITAL | Age: 63
End: 2018-06-18
Attending: INTERNAL MEDICINE
Payer: COMMERCIAL

## 2018-06-18 DIAGNOSIS — N18.30 CHRONIC KIDNEY DISEASE (CKD), STAGE III (MODERATE): ICD-10-CM

## 2018-06-18 LAB
ALBUMIN SERPL BCP-MCNC: 3.8 G/DL
ANION GAP SERPL CALC-SCNC: 7 MMOL/L
BUN SERPL-MCNC: 19 MG/DL
CALCIUM SERPL-MCNC: 9.3 MG/DL
CHLORIDE SERPL-SCNC: 108 MMOL/L
CO2 SERPL-SCNC: 29 MMOL/L
CREAT SERPL-MCNC: 1.1 MG/DL
EST. GFR  (AFRICAN AMERICAN): >60 ML/MIN/1.73 M^2
EST. GFR  (NON AFRICAN AMERICAN): 53.6 ML/MIN/1.73 M^2
GLUCOSE SERPL-MCNC: 86 MG/DL
PHOSPHATE SERPL-MCNC: 2.6 MG/DL
POTASSIUM SERPL-SCNC: 4.1 MMOL/L
SODIUM SERPL-SCNC: 144 MMOL/L

## 2018-06-18 PROCEDURE — 80069 RENAL FUNCTION PANEL: CPT

## 2018-06-18 PROCEDURE — 36415 COLL VENOUS BLD VENIPUNCTURE: CPT | Mod: PO

## 2018-06-25 ENCOUNTER — OFFICE VISIT (OUTPATIENT)
Dept: NEPHROLOGY | Facility: CLINIC | Age: 63
End: 2018-06-25
Payer: COMMERCIAL

## 2018-06-25 VITALS
BODY MASS INDEX: 26.88 KG/M2 | DIASTOLIC BLOOD PRESSURE: 80 MMHG | WEIGHT: 151.69 LBS | HEART RATE: 72 BPM | HEIGHT: 63 IN | SYSTOLIC BLOOD PRESSURE: 126 MMHG

## 2018-06-25 DIAGNOSIS — I10 HTN (HYPERTENSION), BENIGN: Primary | ICD-10-CM

## 2018-06-25 PROCEDURE — 99999 PR PBB SHADOW E&M-EST. PATIENT-LVL III: CPT | Mod: PBBFAC,,, | Performed by: INTERNAL MEDICINE

## 2018-06-25 PROCEDURE — 99214 OFFICE O/P EST MOD 30 MIN: CPT | Mod: S$GLB,,, | Performed by: INTERNAL MEDICINE

## 2018-06-25 NOTE — PROGRESS NOTES
Subjective:       Patient ID: Kacy Blackwell is a 62 y.o.   female who presents for follow-up evaluation of HTN , CKD stage 2/3         Gurinder Pierce MD      HPI : Ms Kacy Blackwell is a 62 -year-old AA woman seen in office today in f/u  for above medical problems .  I saw her in clinic about 6 months ago.  Patient was eval by  endocrinology for  primary hyperaldosteronism.  Serum renin/Ezra ratio is significantly elevated.  Next up in the workup include adrenal vein sampling, CT scan of the abdomen shows normal adrenal glands.  Patient did not want to proceed with any invasive testing and wanted to continue management of her condition with medications alone.  Her blood pressure is controlled on metoprolol and triamterene.  Her mother also has hypertension and is on about 8 blood pressure medications, she is 95.   Patient has chronic hypokalemia dating back to 2009. She was started on potassium supplements.  Patient was initially started on Aldactone. Later  switched to triamterene secondary to concerns of side effects of aldactone, she has been tolerating Triamterene well,         Past Medical History:   Diagnosis Date    Chronic cough     controlled w flonase/zyrtec    Hyperaldosteronism     dr cummings/marcello    Hyperlipidemia     Hypertension     Hypokalemia     Osteoporosis 12/16 naeem 12/18    Seasonal allergies            Current Outpatient Prescriptions on File Prior to Visit   Medication Sig Dispense Refill    alendronate (FOSAMAX) 70 MG tablet TAKE 1 TABLET ONCE WEEKLY IN THE MORNING WITH A FULL GLASS OF WATER ON AN EMPTY STOMACH. DO NOT LIE DOWN FOR 12 tablet 2    BIOTIN ORAL Take 10,000 mg by mouth once daily.       calcium carbonate-vit D3-min (CALTRATE 600+D PLUS MINERALS) 600 mg calcium- 400 unit Tab Take 1 tablet by mouth once daily.      cetirizine (ZYRTEC) 10 MG tablet Take 10 mg by mouth once daily.      metoprolol succinate (TOPROL-XL) 50 MG 24 hr tablet Take  1 tablet (50 mg total) by mouth once daily. 90 tablet 0    multivitamin capsule Take 1 capsule by mouth once daily.      peg 400-propylene glycol, PF, (SYSTANE, PF,) 0.4-0.3 % Dpet Apply to eye.      potassium chloride SA (K-DUR,KLOR-CON) 20 MEQ tablet TAKE ONE TABLET BY MOUTH TWICE DAILY 60 tablet 11    rosuvastatin (CRESTOR) 5 MG tablet Take 1 tablet (5 mg total) by mouth nightly. 30 tablet 11    triamterene (DYRENIUM) 100 MG Cap Take 1 capsule (100 mg total) by mouth once daily. 90 capsule 3    zolpidem (AMBIEN) 5 MG Tab Take 1 tablet (5 mg total) by mouth nightly as needed. 30 tablet 0     No current facility-administered medications on file prior to visit.          Review of Systems  :      Constitutional: Negative for activity change, appetite change, fatigue and fever.   HENT: Negative for congestion, facial swelling, sore throat, trouble swallowing and voice change.    Eyes: Negative for redness and visual disturbance.   Respiratory: Negative for apnea, cough, chest tightness, shortness of breath and wheezing.    Cardiovascular: Negative for chest pain, palpitations and leg swelling.   Gastrointestinal: Negative for abdominal distention, abdominal pain, blood in stool, constipation, diarrhea, nausea and vomiting.   Genitourinary: Negative for decreased urine volume, difficulty urinating, dysuria, flank pain, frequency, hematuria, pelvic pain and urgency.   Musculoskeletal: Negative for back pain, gait problem and joint swelling.   Skin: Negative for color change and rash.   Neurological: Negative for dizziness, syncope, weakness and headaches.   Hematological: Does not bruise/bleed easily.   Psychiatric/Behavioral: Negative for agitation, behavioral problems and confusion. The patient is not nervous/anxious.            Objective:         Vitals:    06/25/18 1328   BP: 126/80   Pulse: 72       Weight 151 lbs , stable       Physical Exam  :      Constitutional: She is oriented to person, place, and time.  She appears well-developed and well-nourished. No distress.   HENT: Head: Normocephalic and atraumatic. Mouth/Throat: Oropharynx is clear and moist. No oropharyngeal exudate.   Eyes: Conjunctivae normal and EOM are normal. Pupils are equal, round, and reactive to light. No scleral icterus.   Neck: Normal range of motion. Neck supple. No JVD present. Carotid bruit is not present. No tracheal deviation present. No mass and no thyromegaly present.   Cardiovascular: Normal rate, regular rhythm, normal heart sounds and intact distal pulses. Exam reveals no gallop and no friction rub.   No murmur heard.  Pulmonary/Chest: Effort normal and breath sounds normal. No respiratory distress. She has no wheezes. She has no rales. She exhibits no tenderness.   Abdominal: Soft. Bowel sounds are normal. She exhibits no distension, no abdominal bruit, no ascites and no mass. There is no hepatosplenomegaly. There is no tenderness. There is no rebound, no guarding and no CVA tenderness.   Musculoskeletal: Normal range of motion. She exhibits no edema and no tenderness.   Lymphadenopathy: She has no cervical adenopathy.   Neurological: She is alert and oriented to person, place, and time. She has normal reflexes. No cranial nerve deficit. She exhibits normal muscle tone. Coordination normal.   Skin: Skin is warm and intact. No rash noted. No erythema. No pallor.   Psychiatric: She has a normal mood and affect. Her behavior is normal. Judgment normal.            Labs:    Lab Results   Component Value Date    CREATININE 1.1 06/18/2018    BUN 19 06/18/2018     06/18/2018    K 4.1 06/18/2018     06/18/2018    CO2 29 06/18/2018       Lab Results   Component Value Date    WBC 6.00 05/07/2018    HGB 12.5 05/07/2018    HCT 38.8 05/07/2018    MCV 92 05/07/2018     05/07/2018       Lab Results   Component Value Date    CALCIUM 9.3 06/18/2018    PHOS 2.6 (L) 06/18/2018       Lab Results   Component Value Date    ALBUMIN 3.8  06/18/2018       Lab Results   Component Value Date    URICACID 5.9 (H) 12/01/2017       62 -year-old  woman seen office today in f/u for following medical problems ,      1.  Hypertension - her blood pressure is controlled,  Continue current regimen.  Discussed low-salt diet.      2.  Primary hyperaldosteronism. CAT scan of the abdomen shows normal adrenal glands. Continue triamtrene 100 mg daily. Also she is on potassium chloride 20 mEq twice a day.    patient did not want to proceed with adrenal vein sampling,     3. CKD stage 3 : Stable renal function with a serum creatinine of 1.1 mg/dL, advised patient to avoid NSAID use.     4.  Recent urinalysis shows normal range proteinuria.     5.  Hypokalemia - continue potassium supplements and triamterene, discussed potassium rich diet,     We'll see her in followup in about 6 months.   more than 25 minutes of face-to-face time was spent with the patient discussing labs and plan of care.      Sincerely,      Cliff Barrera M.D.

## 2018-07-06 RX ORDER — METOPROLOL SUCCINATE 50 MG/1
50 TABLET, EXTENDED RELEASE ORAL DAILY
Qty: 90 TABLET | Refills: 0 | Status: SHIPPED | OUTPATIENT
Start: 2018-07-06 | End: 2018-09-28 | Stop reason: SDUPTHER

## 2018-09-13 RX ORDER — ZOLPIDEM TARTRATE 5 MG/1
5 TABLET ORAL NIGHTLY PRN
Qty: 30 TABLET | Refills: 5 | Status: SHIPPED | OUTPATIENT
Start: 2018-09-13 | End: 2019-07-08 | Stop reason: SDUPTHER

## 2018-09-28 RX ORDER — METOPROLOL SUCCINATE 50 MG/1
50 TABLET, EXTENDED RELEASE ORAL DAILY
Qty: 90 TABLET | Refills: 0 | Status: SHIPPED | OUTPATIENT
Start: 2018-09-28 | End: 2019-01-02 | Stop reason: SDUPTHER

## 2018-10-30 ENCOUNTER — TELEPHONE (OUTPATIENT)
Dept: NEPHROLOGY | Facility: CLINIC | Age: 63
End: 2018-10-30

## 2018-10-30 ENCOUNTER — TELEPHONE (OUTPATIENT)
Dept: INTERNAL MEDICINE | Facility: CLINIC | Age: 63
End: 2018-10-30

## 2018-10-30 DIAGNOSIS — Z12.31 ENCOUNTER FOR SCREENING MAMMOGRAM FOR MALIGNANT NEOPLASM OF BREAST: Primary | ICD-10-CM

## 2018-10-30 NOTE — TELEPHONE ENCOUNTER
----- Message from Edel Hanson sent at 10/30/2018 11:12 AM CDT -----  Contact: self  Pt. Requesting mammo order. Please call back at [phone].    Thanks,  Edel Hanson

## 2018-10-30 NOTE — TELEPHONE ENCOUNTER
----- Message from Edel Hanson sent at 10/30/2018 11:14 AM CDT -----  Contact: self  Pt requesting call back regarding blood work being done. Please call back at [phone].      Thanks,  Edel Hanson

## 2018-12-03 ENCOUNTER — HOSPITAL ENCOUNTER (OUTPATIENT)
Dept: RADIOLOGY | Facility: HOSPITAL | Age: 63
Discharge: HOME | End: 2018-12-03
Attending: FAMILY MEDICINE
Payer: COMMERCIAL

## 2018-12-03 DIAGNOSIS — Z12.31 ENCOUNTER FOR SCREENING MAMMOGRAM FOR MALIGNANT NEOPLASM OF BREAST: ICD-10-CM

## 2018-12-03 PROCEDURE — 77063 BREAST TOMOSYNTHESIS BI: CPT | Mod: TC,PO

## 2018-12-03 PROCEDURE — 77067 SCR MAMMO BI INCL CAD: CPT | Mod: TC,PO

## 2018-12-03 PROCEDURE — 77067 SCR MAMMO BI INCL CAD: CPT | Mod: 26,,, | Performed by: RADIOLOGY

## 2018-12-03 PROCEDURE — 77063 BREAST TOMOSYNTHESIS BI: CPT | Mod: 26,,, | Performed by: RADIOLOGY

## 2018-12-06 ENCOUNTER — PATIENT MESSAGE (OUTPATIENT)
Dept: INTERNAL MEDICINE | Facility: CLINIC | Age: 63
End: 2018-12-06

## 2019-01-02 RX ORDER — METOPROLOL SUCCINATE 50 MG/1
50 TABLET, EXTENDED RELEASE ORAL DAILY
Qty: 90 TABLET | Refills: 3 | Status: SHIPPED | OUTPATIENT
Start: 2019-01-02 | End: 2019-01-31 | Stop reason: DRUGHIGH

## 2019-01-08 ENCOUNTER — PATIENT MESSAGE (OUTPATIENT)
Dept: NEPHROLOGY | Facility: CLINIC | Age: 64
End: 2019-01-08

## 2019-01-08 DIAGNOSIS — I10 HTN (HYPERTENSION), BENIGN: Primary | ICD-10-CM

## 2019-01-08 RX ORDER — TRIAMTERENE CAPSULES 100 MG/1
100 CAPSULE ORAL DAILY
Qty: 30 CAPSULE | Refills: 11 | Status: SHIPPED | OUTPATIENT
Start: 2019-01-08 | End: 2019-01-09 | Stop reason: ALTCHOICE

## 2019-01-08 NOTE — PROGRESS NOTES
from pt          My insurance co.will no longer pay for Dyrenium can you please fax a replacement generic to my pharmacy  listed on file the discussion I had with you a few years back about generic was the one u suggested caused CANCER in lab Rats.

## 2019-01-09 ENCOUNTER — TELEPHONE (OUTPATIENT)
Dept: NEUROLOGY | Facility: CLINIC | Age: 64
End: 2019-01-09

## 2019-01-09 DIAGNOSIS — I10 HTN (HYPERTENSION), BENIGN: Primary | ICD-10-CM

## 2019-01-09 RX ORDER — AMILORIDE HYDROCHLORIDE 5 MG/1
5 TABLET ORAL DAILY
Qty: 30 TABLET | Refills: 11 | Status: SHIPPED | OUTPATIENT
Start: 2019-01-09 | End: 2020-01-13 | Stop reason: SDUPTHER

## 2019-01-09 NOTE — PROGRESS NOTES
Patient was on triamterene, currently not covered by pharmacy, will switch her to amiloride 5 mg daily,    Dr Barerra

## 2019-01-09 NOTE — TELEPHONE ENCOUNTER
Pharmacy states that generic brand of Dyrenium is no longer available.  Pt can't afford trade brand even with a pa.  They are requesting an alternative.  Please send to [facility].

## 2019-01-09 NOTE — TELEPHONE ENCOUNTER
Will switch to amiloride 5 mg daily, prescription sent to pharmacy, check renal panel in a week to monitor gudelia Barrera

## 2019-01-09 NOTE — TELEPHONE ENCOUNTER
----- Message from Yazmin Harden sent at 1/9/2019  2:19 PM CST -----  Contact: [facility] pharmacy  Caller needs call back bere verde, caller states medication is not available [phone]

## 2019-01-31 ENCOUNTER — OFFICE VISIT (OUTPATIENT)
Dept: NEPHROLOGY | Facility: CLINIC | Age: 64
End: 2019-01-31
Payer: COMMERCIAL

## 2019-01-31 ENCOUNTER — LAB VISIT (OUTPATIENT)
Dept: LAB | Facility: HOSPITAL | Age: 64
End: 2019-01-31
Attending: INTERNAL MEDICINE
Payer: COMMERCIAL

## 2019-01-31 VITALS
BODY MASS INDEX: 27.31 KG/M2 | SYSTOLIC BLOOD PRESSURE: 140 MMHG | DIASTOLIC BLOOD PRESSURE: 92 MMHG | HEART RATE: 68 BPM | WEIGHT: 154.13 LBS | HEIGHT: 63 IN

## 2019-01-31 DIAGNOSIS — I10 HTN (HYPERTENSION), BENIGN: Primary | ICD-10-CM

## 2019-01-31 DIAGNOSIS — I20.89 ANGINA AT REST: ICD-10-CM

## 2019-01-31 DIAGNOSIS — I10 HTN (HYPERTENSION), BENIGN: ICD-10-CM

## 2019-01-31 LAB
ALBUMIN SERPL BCP-MCNC: 4 G/DL
ANION GAP SERPL CALC-SCNC: 8 MMOL/L
BUN SERPL-MCNC: 16 MG/DL
CALCIUM SERPL-MCNC: 10 MG/DL
CHLORIDE SERPL-SCNC: 103 MMOL/L
CO2 SERPL-SCNC: 29 MMOL/L
CREAT SERPL-MCNC: 1 MG/DL
EST. GFR  (AFRICAN AMERICAN): >60 ML/MIN/1.73 M^2
EST. GFR  (NON AFRICAN AMERICAN): 59.7 ML/MIN/1.73 M^2
GLUCOSE SERPL-MCNC: 88 MG/DL
PHOSPHATE SERPL-MCNC: 2.9 MG/DL
POTASSIUM SERPL-SCNC: 3.9 MMOL/L
SODIUM SERPL-SCNC: 140 MMOL/L

## 2019-01-31 PROCEDURE — 99214 OFFICE O/P EST MOD 30 MIN: CPT | Mod: S$GLB,,, | Performed by: INTERNAL MEDICINE

## 2019-01-31 PROCEDURE — 36415 COLL VENOUS BLD VENIPUNCTURE: CPT

## 2019-01-31 PROCEDURE — 99999 PR PBB SHADOW E&M-EST. PATIENT-LVL III: CPT | Mod: PBBFAC,,, | Performed by: INTERNAL MEDICINE

## 2019-01-31 PROCEDURE — 80069 RENAL FUNCTION PANEL: CPT

## 2019-01-31 PROCEDURE — 99999 PR PBB SHADOW E&M-EST. PATIENT-LVL III: ICD-10-PCS | Mod: PBBFAC,,, | Performed by: INTERNAL MEDICINE

## 2019-01-31 PROCEDURE — 99214 PR OFFICE/OUTPT VISIT, EST, LEVL IV, 30-39 MIN: ICD-10-PCS | Mod: S$GLB,,, | Performed by: INTERNAL MEDICINE

## 2019-01-31 RX ORDER — METOPROLOL SUCCINATE 100 MG/1
100 TABLET, EXTENDED RELEASE ORAL DAILY
Qty: 30 TABLET | Refills: 11 | Status: SHIPPED | OUTPATIENT
Start: 2019-01-31 | End: 2020-01-28 | Stop reason: SDUPTHER

## 2019-01-31 NOTE — PROGRESS NOTES
Subjective:       Patient ID: Kacy Blackwell is a 63 y.o.   female who presents for follow-up evaluation of HTN , CKD stage 2/3           Gurinder Pierce MD      HPI : Ms Kacy Blackwell is a 63 -year-old AA woman seen in office today in f/u  for above medical problems .  I saw her in clinic about 6 months ago.  Patient was eval by  endocrinology for  primary hyperaldosteronism.  Serum renin/Ezra ratio is significantly elevated.  Next up in the workup include adrenal vein sampling, CT scan of the abdomen shows normal adrenal glands.  Patient did not want to proceed with any invasive testing and wanted to continue management of her condition with medications alone.   Her mother also has hypertension and is on about 8 blood pressure medications, she is 96.   Patient has chronic hypokalemia dating back to 2009. She was started on potassium supplements.  Patient was initially started on Aldactone. Later  switched to triamterene secondary to concerns of side effects of aldactone, and now Amiloride due to insurance coverage ,          Past Medical History:   Diagnosis Date    Chronic cough     controlled w flonase/zyrtec    Hyperaldosteronism     dr cummings/marcello    Hyperlipidemia     Hypertension     Hypokalemia     Osteoporosis 12/16 naeem 12/18    Seasonal allergies        Current Outpatient Medications on File Prior to Visit   Medication Sig Dispense Refill    aMILoride (MIDAMOR) 5 MG Tab Take 1 tablet (5 mg total) by mouth once daily. 30 tablet 11    BIOTIN ORAL Take 10,000 mg by mouth once daily.       calcium carbonate-vit D3-min (CALTRATE 600+D PLUS MINERALS) 600 mg calcium- 400 unit Tab Take 1 tablet by mouth once daily.      cetirizine (ZYRTEC) 10 MG tablet Take 10 mg by mouth once daily.      metoprolol succinate (TOPROL-XL) 50 MG 24 hr tablet Take 1 tablet (50 mg total) by mouth once daily. 90 tablet 3    multivitamin capsule Take 1 capsule by mouth once daily.      peg  400-propylene glycol, PF, (SYSTANE, PF,) 0.4-0.3 % Dpet Apply to eye.      potassium chloride SA (K-DUR,KLOR-CON) 20 MEQ tablet TAKE ONE TABLET BY MOUTH TWICE DAILY 60 tablet 11    rosuvastatin (CRESTOR) 5 MG tablet Take 1 tablet (5 mg total) by mouth nightly. 30 tablet 11    zolpidem (AMBIEN) 5 MG Tab Take 1 tablet (5 mg total) by mouth nightly as needed. 30 tablet 5     No current facility-administered medications on file prior to visit.          Review of Systems  :        Constitutional: Negative for activity change, appetite change, fatigue and fever.   HENT: Negative for congestion, facial swelling, sore throat, trouble swallowing and voice change.    Eyes: Negative for redness and visual disturbance.   Respiratory: Negative for apnea, cough, chest tightness, shortness of breath and wheezing.    Cardiovascular: Negative for  palpitations and leg swelling. She has been having some angina at rest,  Gastrointestinal: Negative for abdominal distention, abdominal pain, blood in stool, constipation, diarrhea, nausea and vomiting.   Genitourinary: Negative for decreased urine volume, difficulty urinating, dysuria, flank pain, frequency, hematuria, pelvic pain and urgency.   Musculoskeletal: Negative for back pain, gait problem and joint swelling.   Skin: Negative for color change and rash.   Neurological: Negative for dizziness, syncope, weakness and headaches.   Hematological: Does not bruise/bleed easily.   Psychiatric/Behavioral: Negative for agitation, behavioral problems and confusion. The patient is not nervous/anxious.                 Objective:         Vitals:    01/31/19 1430   BP: (!) 140/92   Pulse: 68       Weight 154 lb, previous weight was 150 lb      Physical Exam  :        Constitutional: She is oriented to person, place, and time. She appears well-developed and well-nourished. No distress.   HENT: Head: Normocephalic and atraumatic. Mouth/Throat: Oropharynx is clear and moist. No oropharyngeal  exudate.   Eyes: Conjunctivae normal and EOM are normal. Pupils are equal, round, and reactive to light. No scleral icterus.   Neck: Normal range of motion. Neck supple. No JVD present. Carotid bruit is not present. No tracheal deviation present. No mass and no thyromegaly present.   Cardiovascular: Normal rate, regular rhythm, normal heart sounds and intact distal pulses. Exam reveals no gallop and no friction rub.   No murmur heard.  Pulmonary/Chest: Effort normal and breath sounds normal. No respiratory distress. She has no wheezes. She has no rales. She exhibits no tenderness.   Abdominal: Soft. Bowel sounds are normal. She exhibits no distension, no abdominal bruit, no ascites and no mass. There is no hepatosplenomegaly. There is no tenderness. There is no rebound, no guarding and no CVA tenderness.   Musculoskeletal: Normal range of motion. She exhibits no edema and no tenderness.   Lymphadenopathy: She has no cervical adenopathy.   Neurological: She is alert and oriented to person, place, and time. She has normal reflexes. No cranial nerve deficit. She exhibits normal muscle tone. Coordination normal.   Skin: Skin is warm and intact. No rash noted. No erythema. No pallor.   Psychiatric: She has a normal mood and affect. Her behavior is normal. Judgment normal.         Labs:    Lab Results   Component Value Date    CREATININE 1.0 12/03/2018    BUN 20 12/03/2018     12/03/2018    K 3.9 12/03/2018     12/03/2018    CO2 29 12/03/2018       Lab Results   Component Value Date    WBC 6.04 12/03/2018    HGB 12.3 12/03/2018    HCT 40.6 12/03/2018    MCV 98 12/03/2018     12/03/2018       Lab Results   Component Value Date    CALCIUM 9.9 12/03/2018    PHOS 3.1 12/03/2018       Lab Results   Component Value Date    ALBUMIN 4.0 12/03/2018       Lab Results   Component Value Date    URICACID 5.9 (H) 12/01/2017       63 -year-old  woman seen office today in f/u for following medical  problems ,      1.  Hypertension - blood pressure is slightly elevated, target blood pressure less than 130/80, will increase Toprol-XL from 50 mg daily to 100 mg daily, continue amiloride 5 mg daily,     2.  Primary hyperaldosteronism. CAT scan of the abdomen shows normal adrenal glands.  Continue amiloride and potassium supplements,     Also she is on potassium chloride 20 mEq twice a day.    patient did not want to proceed with adrenal vein sampling,     3. CKD stage 2/3 : Stable renal function with a serum creatinine of 1 mg/dL, advised patient to avoid NSAID use.     4.  Recent urinalysis shows normal range proteinuria.     5.  Hypokalemia - continue potassium supplements and triamterene, discussed potassium rich diet,    6.  Angina - can be reflux, will schedule an appointment with Cardiology for further evaluation,     We'll see her in followup in about 6 months.   more than 25 minutes of face-to-face time was spent with the patient discussing labs and plan of care.      Sincerely,      Cliff Barrera M.D.

## 2019-01-31 NOTE — PATIENT INSTRUCTIONS
Avoid NSAID pain medications such as advil, aleve, motrin, ibuprofen, naprosyn, meloxicam, diclofenac, mobic.     1) goal blood pressure is less than 130/80    2) please bring BP machine at next appt    3) keep a track of daily BPs and bring at next appt    4) bring all meds in a bag at next appt       Increase Toprol XL to 100 mg daily

## 2019-02-05 ENCOUNTER — PATIENT MESSAGE (OUTPATIENT)
Dept: NEPHROLOGY | Facility: CLINIC | Age: 64
End: 2019-02-05

## 2019-02-05 DIAGNOSIS — I10 ESSENTIAL HYPERTENSION: Primary | ICD-10-CM

## 2019-02-08 ENCOUNTER — LAB VISIT (OUTPATIENT)
Dept: LAB | Facility: HOSPITAL | Age: 64
End: 2019-02-08
Attending: INTERNAL MEDICINE
Payer: COMMERCIAL

## 2019-02-08 DIAGNOSIS — I10 HTN (HYPERTENSION), BENIGN: ICD-10-CM

## 2019-02-08 LAB
25(OH)D3+25(OH)D2 SERPL-MCNC: 54 NG/ML
ALBUMIN SERPL BCP-MCNC: 4 G/DL
ANION GAP SERPL CALC-SCNC: 8 MMOL/L
BASOPHILS # BLD AUTO: 0.01 K/UL
BASOPHILS NFR BLD: 0.1 %
BUN SERPL-MCNC: 19 MG/DL
CALCIUM SERPL-MCNC: 10.2 MG/DL
CHLORIDE SERPL-SCNC: 104 MMOL/L
CO2 SERPL-SCNC: 28 MMOL/L
CREAT SERPL-MCNC: 1.1 MG/DL
DIFFERENTIAL METHOD: ABNORMAL
EOSINOPHIL # BLD AUTO: 0.1 K/UL
EOSINOPHIL NFR BLD: 2.1 %
ERYTHROCYTE [DISTWIDTH] IN BLOOD BY AUTOMATED COUNT: 13.5 %
EST. GFR  (AFRICAN AMERICAN): >60 ML/MIN/1.73 M^2
EST. GFR  (NON AFRICAN AMERICAN): 53.2 ML/MIN/1.73 M^2
GLUCOSE SERPL-MCNC: 87 MG/DL
HCT VFR BLD AUTO: 39.8 %
HGB BLD-MCNC: 12.3 G/DL
IMM GRANULOCYTES # BLD AUTO: 0.02 K/UL
IMM GRANULOCYTES NFR BLD AUTO: 0.3 %
LYMPHOCYTES # BLD AUTO: 2.1 K/UL
LYMPHOCYTES NFR BLD: 31.8 %
MCH RBC QN AUTO: 29.6 PG
MCHC RBC AUTO-ENTMCNC: 30.9 G/DL
MCV RBC AUTO: 96 FL
MONOCYTES # BLD AUTO: 0.4 K/UL
MONOCYTES NFR BLD: 6.6 %
NEUTROPHILS # BLD AUTO: 3.9 K/UL
NEUTROPHILS NFR BLD: 59.1 %
NRBC BLD-RTO: 0 /100 WBC
PHOSPHATE SERPL-MCNC: 3.5 MG/DL
PLATELET # BLD AUTO: 277 K/UL
PMV BLD AUTO: 11 FL
POTASSIUM SERPL-SCNC: 4.1 MMOL/L
RBC # BLD AUTO: 4.15 M/UL
SODIUM SERPL-SCNC: 140 MMOL/L
WBC # BLD AUTO: 6.67 K/UL

## 2019-02-08 PROCEDURE — 85025 COMPLETE CBC W/AUTO DIFF WBC: CPT

## 2019-02-08 PROCEDURE — 82306 VITAMIN D 25 HYDROXY: CPT

## 2019-02-08 PROCEDURE — 80069 RENAL FUNCTION PANEL: CPT

## 2019-02-08 PROCEDURE — 36415 COLL VENOUS BLD VENIPUNCTURE: CPT

## 2019-03-27 DIAGNOSIS — Z00.00 ROUTINE GENERAL MEDICAL EXAMINATION AT A HEALTH CARE FACILITY: Primary | ICD-10-CM

## 2019-04-01 ENCOUNTER — OFFICE VISIT (OUTPATIENT)
Dept: ALLERGY | Facility: CLINIC | Age: 64
End: 2019-04-01
Payer: COMMERCIAL

## 2019-04-01 VITALS
DIASTOLIC BLOOD PRESSURE: 80 MMHG | HEIGHT: 63 IN | HEART RATE: 74 BPM | BODY MASS INDEX: 27.5 KG/M2 | RESPIRATION RATE: 15 BRPM | TEMPERATURE: 98 F | WEIGHT: 155.19 LBS | SYSTOLIC BLOOD PRESSURE: 130 MMHG

## 2019-04-01 DIAGNOSIS — J30.89 ALLERGIC RHINITIS DUE TO DERMATOPHAGOIDES FARINAE: ICD-10-CM

## 2019-04-01 DIAGNOSIS — J30.89 ALLERGIC RHINITIS DUE TO DERMATOPHAGOIDES PTERONYSSINUS: ICD-10-CM

## 2019-04-01 DIAGNOSIS — R05.9 COUGH: Primary | ICD-10-CM

## 2019-04-01 PROCEDURE — 96372 THER/PROPH/DIAG INJ SC/IM: CPT | Mod: S$GLB,,, | Performed by: ALLERGY & IMMUNOLOGY

## 2019-04-01 PROCEDURE — 99213 PR OFFICE/OUTPT VISIT, EST, LEVL III, 20-29 MIN: ICD-10-PCS | Mod: 25,S$GLB,, | Performed by: ALLERGY & IMMUNOLOGY

## 2019-04-01 PROCEDURE — 99213 OFFICE O/P EST LOW 20 MIN: CPT | Mod: 25,S$GLB,, | Performed by: ALLERGY & IMMUNOLOGY

## 2019-04-01 PROCEDURE — 96372 PR INJECTION,THERAP/PROPH/DIAG2ST, IM OR SUBCUT: ICD-10-PCS | Mod: S$GLB,,, | Performed by: ALLERGY & IMMUNOLOGY

## 2019-04-01 PROCEDURE — 99999 PR PBB SHADOW E&M-EST. PATIENT-LVL III: CPT | Mod: PBBFAC,,, | Performed by: ALLERGY & IMMUNOLOGY

## 2019-04-01 PROCEDURE — 99999 PR PBB SHADOW E&M-EST. PATIENT-LVL III: ICD-10-PCS | Mod: PBBFAC,,, | Performed by: ALLERGY & IMMUNOLOGY

## 2019-04-01 RX ORDER — METHYLPREDNISOLONE ACETATE 40 MG/ML
40 INJECTION, SUSPENSION INTRA-ARTICULAR; INTRALESIONAL; INTRAMUSCULAR; SOFT TISSUE ONCE
Status: COMPLETED | OUTPATIENT
Start: 2019-04-01 | End: 2019-04-01

## 2019-04-01 RX ADMIN — METHYLPREDNISOLONE ACETATE 40 MG: 40 INJECTION, SUSPENSION INTRA-ARTICULAR; INTRALESIONAL; INTRAMUSCULAR; SOFT TISSUE at 02:04

## 2019-04-01 NOTE — PROGRESS NOTES
Chief complaint cough    This note was dictated using voice recognition software and may contain errors.    History:    She had a 2:00 p.m. appointment on Monday April 1, 2019.  Information in her medical record regarding her past medical history family history and social history was reviewed and updated today.  Significant addition see if any are as noted below.    Her last appointment with me occurred in February of 2018.  At that time she received treatment with an intramuscular injection of Depo-Medrol 40 mg.  She stated that the treatment was very helpful and she did well until 3 weeks ago.  Prior to the redeveloped min of her coughing 3 weeks ago she stated that she had an upper respiratory tract infectious illness.  The cough developed subsequently and has persisted.  She is requesting treatment at this time.  She is interested in receiving the same treatment which was helpful last year.    We reviewed the results of the chest x-ray report and report of the CT scan of the paranasal sinuses performed in 18.    Review of systems:  At the time of her appointment today she is feeling well otherwise and had no additional new symptoms dimension    Exam:    In general she is in no distress.  She is alert oriented well-developed in good mood and attentive  Gait steady  Skin no rash noted  Head no swelling noted  Eyes scleral white conjunctiva pink  Nose patent no polyp seen  Mouth no inflammation or swelling of the lips tongue or in the throat noted  Ears not inflamed.  Wax is present each external ear canal.  Neck no masses or thyromegaly noted  Lymph nodes no significant cervical or epitrochlear lymphadenopathy noted  Lungs clear to auscultation.  Normal breath sounds good air exchange.  No wheezing heard.  Normal abnormal sounds heard  Heart regular rhythm no murmurs heard  Extremities no swelling or inflammation of the hands legs noted impression:    1.  Cough occurring subsequent to a respiratory tract  infectious illness, possibly due post infectious inflammation of the airway mucus membranes  2.  Allergic rhinitis  3.  Other health concerns as noted in her medical record.      Assessment and plan:    We discussed her recent symptoms.  She found the use of Depo-Medrol to be very helpful last year.   It is reasonable for her to receive treatment at this time in view of the troublesome nature of her cough.  At her request, while she was here today, my nurse administered Depo-Medrol 40 mg by intramuscular injection.  She was monitored for 15 minutes.  She tolerated the injection and administration of medication.    She was given the office phone number.  Should she have additional questions or concerns she was instructed to call.

## 2019-04-12 DIAGNOSIS — E87.6 HYPOKALEMIA: Primary | ICD-10-CM

## 2019-04-12 NOTE — TELEPHONE ENCOUNTER
Refill request sent to provider for authorization (Potassium Cl).    LV 05/07/18  NV 05/06/19    Last Renal panel 02/08/19.

## 2019-04-15 RX ORDER — POTASSIUM CHLORIDE 20 MEQ/1
20 TABLET, EXTENDED RELEASE ORAL 2 TIMES DAILY
Qty: 60 TABLET | Refills: 11 | Status: SHIPPED | OUTPATIENT
Start: 2019-04-15 | End: 2020-04-21 | Stop reason: SDUPTHER

## 2019-05-06 ENCOUNTER — OFFICE VISIT (OUTPATIENT)
Dept: OBSTETRICS AND GYNECOLOGY | Facility: CLINIC | Age: 64
End: 2019-05-06
Payer: COMMERCIAL

## 2019-05-06 ENCOUNTER — CLINICAL SUPPORT (OUTPATIENT)
Dept: INTERNAL MEDICINE | Facility: CLINIC | Age: 64
End: 2019-05-06
Payer: COMMERCIAL

## 2019-05-06 ENCOUNTER — CLINICAL SUPPORT (OUTPATIENT)
Dept: CARDIOLOGY | Facility: CLINIC | Age: 64
End: 2019-05-06
Payer: COMMERCIAL

## 2019-05-06 ENCOUNTER — OFFICE VISIT (OUTPATIENT)
Dept: INTERNAL MEDICINE | Facility: CLINIC | Age: 64
End: 2019-05-06
Payer: COMMERCIAL

## 2019-05-06 VITALS
WEIGHT: 149.94 LBS | SYSTOLIC BLOOD PRESSURE: 134 MMHG | RESPIRATION RATE: 16 BRPM | OXYGEN SATURATION: 98 % | TEMPERATURE: 96 F | HEART RATE: 56 BPM | HEIGHT: 63 IN | BODY MASS INDEX: 26.57 KG/M2 | DIASTOLIC BLOOD PRESSURE: 70 MMHG

## 2019-05-06 VITALS
SYSTOLIC BLOOD PRESSURE: 134 MMHG | SYSTOLIC BLOOD PRESSURE: 130 MMHG | BODY MASS INDEX: 26.57 KG/M2 | HEIGHT: 63 IN | DIASTOLIC BLOOD PRESSURE: 70 MMHG | DIASTOLIC BLOOD PRESSURE: 84 MMHG | RESPIRATION RATE: 16 BRPM | BODY MASS INDEX: 26.99 KG/M2 | WEIGHT: 149.94 LBS | HEIGHT: 63 IN | HEART RATE: 56 BPM | WEIGHT: 152.31 LBS

## 2019-05-06 DIAGNOSIS — Z00.00 ROUTINE GENERAL MEDICAL EXAMINATION AT A HEALTH CARE FACILITY: Primary | ICD-10-CM

## 2019-05-06 DIAGNOSIS — Z00.00 ROUTINE GENERAL MEDICAL EXAMINATION AT A HEALTH CARE FACILITY: ICD-10-CM

## 2019-05-06 DIAGNOSIS — M85.80 OSTEOPENIA, UNSPECIFIED LOCATION: ICD-10-CM

## 2019-05-06 DIAGNOSIS — Z01.419 ENCOUNTER FOR GYNECOLOGICAL EXAMINATION WITHOUT ABNORMAL FINDING: Primary | ICD-10-CM

## 2019-05-06 DIAGNOSIS — N81.11 CYSTOCELE, MIDLINE: ICD-10-CM

## 2019-05-06 DIAGNOSIS — Z00.00 ROUTINE HEALTH MAINTENANCE: Primary | ICD-10-CM

## 2019-05-06 LAB
ALBUMIN SERPL BCP-MCNC: 3.9 G/DL (ref 3.5–5.2)
ALP SERPL-CCNC: 85 U/L (ref 55–135)
ALT SERPL W/O P-5'-P-CCNC: 42 U/L (ref 10–44)
ANION GAP SERPL CALC-SCNC: 10 MMOL/L (ref 8–16)
AST SERPL-CCNC: 23 U/L (ref 10–40)
BACTERIA #/AREA URNS HPF: ABNORMAL /HPF
BILIRUB SERPL-MCNC: 0.7 MG/DL (ref 0.1–1)
BILIRUB UR QL STRIP: NEGATIVE
BUN SERPL-MCNC: 17 MG/DL (ref 8–23)
CALCIUM SERPL-MCNC: 10.1 MG/DL (ref 8.7–10.5)
CHLORIDE SERPL-SCNC: 104 MMOL/L (ref 95–110)
CHOLEST SERPL-MCNC: 175 MG/DL (ref 120–199)
CHOLEST/HDLC SERPL: 2.4 {RATIO} (ref 2–5)
CLARITY UR: CLEAR
CO2 SERPL-SCNC: 29 MMOL/L (ref 23–29)
COLOR UR: YELLOW
CREAT SERPL-MCNC: 1.1 MG/DL (ref 0.5–1.4)
ERYTHROCYTE [DISTWIDTH] IN BLOOD BY AUTOMATED COUNT: 13.5 % (ref 11.5–14.5)
EST. GFR  (AFRICAN AMERICAN): >60 ML/MIN/1.73 M^2
EST. GFR  (NON AFRICAN AMERICAN): 53 ML/MIN/1.73 M^2
GLUCOSE SERPL-MCNC: 93 MG/DL (ref 70–110)
GLUCOSE UR QL STRIP: NEGATIVE
HCT VFR BLD AUTO: 41.8 % (ref 37–48.5)
HDLC SERPL-MCNC: 74 MG/DL (ref 40–75)
HDLC SERPL: 42.3 % (ref 20–50)
HGB BLD-MCNC: 12.8 G/DL (ref 12–16)
HGB UR QL STRIP: NEGATIVE
KETONES UR QL STRIP: ABNORMAL
LDLC SERPL CALC-MCNC: 86.2 MG/DL (ref 63–159)
LEUKOCYTE ESTERASE UR QL STRIP: ABNORMAL
MCH RBC QN AUTO: 29.1 PG (ref 27–31)
MCHC RBC AUTO-ENTMCNC: 30.6 G/DL (ref 32–36)
MCV RBC AUTO: 95 FL (ref 82–98)
MICROSCOPIC COMMENT: ABNORMAL
NITRITE UR QL STRIP: NEGATIVE
NON-SQ EPI CELLS #/AREA URNS HPF: 1 /HPF
NONHDLC SERPL-MCNC: 101 MG/DL
PH UR STRIP: 7 [PH] (ref 5–8)
PLATELET # BLD AUTO: 261 K/UL (ref 150–350)
PMV BLD AUTO: 10.1 FL (ref 9.2–12.9)
POTASSIUM SERPL-SCNC: 3.9 MMOL/L (ref 3.5–5.1)
PROT SERPL-MCNC: 7.4 G/DL (ref 6–8.4)
PROT UR QL STRIP: ABNORMAL
RBC # BLD AUTO: 4.4 M/UL (ref 4–5.4)
RBC #/AREA URNS HPF: 1 /HPF (ref 0–4)
SODIUM SERPL-SCNC: 143 MMOL/L (ref 136–145)
SP GR UR STRIP: 1.01 (ref 1–1.03)
TRIGL SERPL-MCNC: 74 MG/DL (ref 30–150)
URN SPEC COLLECT METH UR: ABNORMAL
WBC # BLD AUTO: 6.91 K/UL (ref 3.9–12.7)
WBC #/AREA URNS HPF: 1 /HPF (ref 0–5)

## 2019-05-06 PROCEDURE — 80061 LIPID PANEL: CPT

## 2019-05-06 PROCEDURE — 93005 ELECTROCARDIOGRAM TRACING: CPT | Mod: PBBFAC | Performed by: NUCLEAR MEDICINE

## 2019-05-06 PROCEDURE — 99386 PREV VISIT NEW AGE 40-64: CPT | Mod: S$GLB,,, | Performed by: NURSE PRACTITIONER

## 2019-05-06 PROCEDURE — 80053 COMPREHEN METABOLIC PANEL: CPT

## 2019-05-06 PROCEDURE — 93010 ELECTROCARDIOGRAM REPORT: CPT | Mod: S$PBB,,, | Performed by: NUCLEAR MEDICINE

## 2019-05-06 PROCEDURE — 85027 COMPLETE CBC AUTOMATED: CPT

## 2019-05-06 PROCEDURE — 99999 PR PBB SHADOW E&M-EST. PATIENT-LVL III: CPT | Mod: PBBFAC,,, | Performed by: NURSE PRACTITIONER

## 2019-05-06 PROCEDURE — 99386 PR PREVENTIVE VISIT,NEW,40-64: ICD-10-PCS | Mod: S$GLB,,, | Performed by: NURSE PRACTITIONER

## 2019-05-06 PROCEDURE — 99999 PR PBB SHADOW E&M-EST. PATIENT-LVL III: ICD-10-PCS | Mod: PBBFAC,,, | Performed by: NURSE PRACTITIONER

## 2019-05-06 PROCEDURE — 99396 PREV VISIT EST AGE 40-64: CPT | Mod: ,,, | Performed by: FAMILY MEDICINE

## 2019-05-06 PROCEDURE — 81000 URINALYSIS NONAUTO W/SCOPE: CPT

## 2019-05-06 PROCEDURE — 93010 EKG 12-LEAD: ICD-10-PCS | Mod: S$PBB,,, | Performed by: NUCLEAR MEDICINE

## 2019-05-06 PROCEDURE — 99999 PR PBB SHADOW E&M-EST. PATIENT-LVL III: CPT | Mod: PBBFAC,,, | Performed by: FAMILY MEDICINE

## 2019-05-06 PROCEDURE — 99396 PR PREVENTIVE VISIT,EST,40-64: ICD-10-PCS | Mod: ,,, | Performed by: FAMILY MEDICINE

## 2019-05-06 PROCEDURE — 99999 PR PBB SHADOW E&M-EST. PATIENT-LVL III: ICD-10-PCS | Mod: PBBFAC,,, | Performed by: FAMILY MEDICINE

## 2019-05-06 RX ORDER — MONTELUKAST SODIUM 10 MG/1
10 TABLET ORAL NIGHTLY
Qty: 30 TABLET | Refills: 5 | Status: SHIPPED | OUTPATIENT
Start: 2019-05-06 | End: 2019-06-05

## 2019-05-06 RX ORDER — ROSUVASTATIN CALCIUM 5 MG/1
5 TABLET, COATED ORAL NIGHTLY
Qty: 30 TABLET | Refills: 11 | Status: SHIPPED | OUTPATIENT
Start: 2019-05-06 | End: 2020-04-23

## 2019-05-06 RX ORDER — FLUTICASONE PROPIONATE 50 MCG
2 SPRAY, SUSPENSION (ML) NASAL DAILY
Qty: 16 G | Refills: 11 | Status: SHIPPED | OUTPATIENT
Start: 2019-05-06 | End: 2020-07-14

## 2019-05-06 NOTE — PATIENT INSTRUCTIONS
Pelvic Organ Prolapse  Pelvic organ prolapse is when 1 or more of the organs inside the pelvis (found between the waist and thighs), slip from their normal positions. Normally, muscles and tissues in the pelvic region support the pelvic organs and hold them in place.  What is a normal pelvis?     Cutaway view of pelvis showing the small intesting, bladder, pubic bone, urethra, pelvic floor muscles, uterus, vagina, and rectum.   A. The small intestine absorbs nutrients from food.  B. The bladder collects and holds urine.  C. The pubic bone helps protect the pelvic organs.  D. The urethra is the tube that carries urine out of the body.  E. The pelvic floor muscles support organs and other structures in the pelvis.  F. The uterus is where the baby develops when a women is pregnant.  G. The vagina is the canal from the uterus to the outside of the body.  H. The rectum stores stool until a bowel movement occurs.  What causes pelvic organ prolapse?   There are several causes of pelvic organ prolapse including:  · Vaginal childbirth  · Genetic predisposition  · Connective tissue disorders  · Advancing age  · Constant coughing (such as with bronchitis or smoking)  · Heavy lifting  · Chronic straining (such as with constipation)  · Being overweight  What are the symptoms of pelvic organ prolapse?  The symptoms of pelvic organ prolapse include:  · A feeling of fullness or pressure in your pelvis  · A sense that a ball or lump is protruding from the vagina  · Problems passing urine or having a bowel movement  · Urine leakage when you cough or use stairs. (But this can happen even without prolapse.)   · Pain or pressure in your low back  · Problems with sexual intercourse  Date Last Reviewed: 5/10/2015  © 3703-7722 [employer]. [address] [place] [address], [place] [address]. All rights reserved. This information is not intended as a substitute for professional medical care. Always follow your healthcare  professional's instructions.        Pelvic Organ Prolapse: Nonsurgical Treatment  If your pelvic organ prolapse is mild or doesnt bother you much, or if you have medical conditions that make surgery too risky, nonsurgical treatment may be a good choice. A device (pessary) to wear in your vagina can help ease your symptoms. You may also be given certain exercises (Kegels) to do. And you may need to make some lifestyle changes.  Wearing a pessary  A pessary helps support the prolapsed organ or organs. It is specifically fitted by your doctor. A pessary may ease your symptoms, but it cant repair prolapse. The pessary must be removed for cleaning. If you cant do this, you will need to see your doctor regularly. He or she will remove and clean your pessary. If you have questions or concerns about the pessary, be sure to talk with your doctor.  Doing kegels  Kegels are simple exercises that you can do to strengthen the pelvic floor muscles. They may ease your symptoms and prevent further prolapse. To do a Kegel, contract your pelvic floor muscles as if to stop the urine stream. (Do this when youre not urinating.) Ask your doctor how many Kegels to do and how long to hold each one. During your treatment visits, your healthcare provider may place a device in your vagina to measure your Kegel contractions. That way, you can find out whether you are doing Kegel exercises correctly.  Living a healthy life  Improving your health may ease your symptoms or keep your problem from worsening. You may be asked to:  · Quit smoking to prevent excessive coughing  · Adjust medications that may cause urine leakage  · Avoid lifting, which puts pressure on pelvic muscles  · Exercise and eat well to maintain a healthy weight   Date Last Reviewed: 5/10/2015  © 0367-6394 [employer]. [address] [place] [address], [place] [address]. All rights reserved. This information is not intended as a substitute for professional medical care.  Always follow your healthcare professional's instructions.        Pelvic Organ Prolapse: Surgery for Cystocele  Cystocele occurs when the bladder prolapses (sags) into the vagina. The goal of surgery is to repair the problem. This will help relieve your symptoms. Your surgery may include 1 or more repairs.     Cystocele         Anterior Repair         Incision made in the vaginal wall       Abdominal incisions      The surgical procedure  Cystocele can be treated with an anterior repair. This type of surgery is done through the vagina. The sagging bladder is moved back into its normal position. Sutures (stitches) are placed in tissue between the bladder and the vagina. This helps hold the bladder in place. In some cases, another type of surgery is done. It can help correct weakness in the front wall of the vagina. The vagina is attached to strong tissues in the side wall of the pelvis.  Your incisions  During surgery, the doctor will reach your pelvic organs through the vagina or the abdomen. An incision may be made in the vaginal wall. Surgery through the abdomen may be done with a single incision made up and down (vertically) or across (transverse), or through several small incisions (called laparoscopy).  Possible risks and complications of this surgery  · Infection  · Bleeding  · Risks of anesthesia  · Damage to nerves, muscles, or nearby pelvic structures  · Blood clots  · Prolapse of the pelvic organ or organs occurring again   Date Last Reviewed: 5/10/2015  © 8667-6453 [employer]. [address] [place] [address]. All rights reserved. This information is not intended as a substitute for professional medical care. Always follow your healthcare professional's instructions.        Pelvic Organ Prolapse: Surgery for Incontinence  The pelvic organs include the organs of your reproductive system and your urinary tract. The pelvic organs are supported by pelvic floor muscles. Pelvic floor  muscles can weaken. This may cause one or more pelvic organs to fall out of place (prolapse). Pelvic organ prolapse can contribute to stress urinary incontinence (LAURA). This is trouble controlling the flow of urine out of the body. You may have surgery to treat LAURA. During this surgery, a prolapse can be fixed. One or more extra incisions may be needed to do this. Your surgeon can discuss the details with you.  Pelvic organ prolapse: common types     Cystocele.This is when the bladder sags into the vagina. To fix this, the bladder is moved back into its normal position. It is then sewn into place. The tissue between the vagina and bladder may be strengthened for better support to keep the bladder from sagging.     Uterine prolapse.This is when the uterus sags into the vagina. The uterus may fall as far as the opening of the vagina. To fix this, the uterus is often removed (hysterectomy). Or, the uterus may be sewn back into place.      Rectocele. This is when the rectum bulges into the vagina. An enterocele (much less common) is when the small intestine bulges into the vagina. During LAURA surgery, the bulge in the rectum or small intestine can be fixed.    Vaginal vault prolapse. This is when the walls of the vagina fall in on themselves. It can happen if the uterus has been removed. Surgery can be done to lift the vagina and hold it in place.   Risks and possible complications of this surgery  · Infection  · Bleeding  · Blood clots  · Risks of anesthesia  · Damage to nerves, muscles, or nearby pelvic structures  · Prolapse of the pelvic organ or organs happening again   Date Last Reviewed: 7/1/2016 © 2000-2017 [employer]. [address] [place] [address], [place] [address]. All rights reserved. This information is not intended as a substitute for professional medical care. Always follow your healthcare professional's instructions.        Kegel Exercises  Kegel exercises dont need special clothing or  equipment. Theyre easy to learn and simple to do. And if you do them right, no one can tell youre doing them, so they can be done almost anywhere. Your healthcare provider, nurse, or physical therapist can answer any questions you have and help you get started.    A weak pelvic floor   The pelvic floor muscles may weaken due to aging, pregnancy and vaginal childbirth, injury, surgery, chronic cough, or lack of exercise. If the pelvic floor is weak, your bladder and other pelvic organs may sag out of place. The urethra may also open too easily and allow urine to leak out. Kegel exercises can help you strengthen your pelvic floor muscles. Then they can better support the pelvic organs and control urine flow.  How Kegel exercises are done  Try each of the Kegel exercises described below. When youre doing them, try not to move your leg, buttock, or stomach muscles.  · Contract as if you were stopping your urine stream. But do it when youre not urinating.  · Tighten your rectum as if trying not to pass gas. Contract your anus, but dont move your buttocks.  · You may place a finger or 2 in the vagina and squeeze your finger with your vagina to learn which muscles to tighten.  Try to hold each Kegel for a slow count to 5. You probably wont be able to hold them for that long at first. But keep practicing. It will get easier as your pelvic floor gets stronger. Eventually, special weights that you place in your vagina may be recommended to help make your Kegels even more effective. Visit your healthcare provider if you have difficulties doing Kegel exercises.  Helpful hints  Here are some tips to follow:  · Do your Kegels as often as you can. The more you do them, the faster youll feel the results.  · Pick an activity you do often as a reminder. For instance, do your Kegels every time you sit down.  · Tighten your pelvic floor before you sneeze, get up from a chair, cough, laugh, or lift. This protects your pelvic  floor from injury and can help prevent urine leakage.   Date Last Reviewed: 8/5/2015  © 7236-4517 [employer]. [address] [place] [address], [place] [address]. All rights reserved. This information is not intended as a substitute for professional medical care. Always follow your healthcare professional's instructions.

## 2019-05-06 NOTE — PROGRESS NOTES
"CC: Well woman exam    Kacy Blackwell is a 63 y.o. female  presents for a well woman exam.    Having issues with urine leakage. Worsens with her allergies that causes her to cough.       Past Medical History:   Diagnosis Date    Chronic cough     controlled w flonase/zyrtec    Hyperaldosteronism     dr cummings/marcello    Hyperlipidemia     Hypertension     Hypokalemia     Osteoporosis  naeem     Seasonal allergies      Past Surgical History:   Procedure Laterality Date    cyst removed from head      HYSTERECTOMY      cervix absent     Family History   Problem Relation Age of Onset    Hypertension Mother     Arthritis Mother     Diabetes Mother     Heart failure Mother     Hypertension Father     Arthritis Father     Cancer Sister     Hypertension Brother     Diabetes Brother     Stroke Brother     Hypertension Sister     Breast cancer Neg Hx     Colon cancer Neg Hx     Ovarian cancer Neg Hx      Social History     Tobacco Use    Smoking status: Never Smoker    Smokeless tobacco: Never Used   Substance Use Topics    Alcohol use: Yes     Comment: rare social drinker    Drug use: No     OB History        3    Para   1    Term   1            AB   1    Living   1       SAB        TAB        Ectopic        Multiple        Live Births                     /84   Ht 5' 3" (1.6 m)   Wt 69.1 kg (152 lb 5.4 oz)   BMI 26.99 kg/m²     ROS:  GENERAL: Denies weight gain or weight loss. Feeling well overall.   SKIN: Denies rash or lesions.   HEAD: Denies head injury or headache.   NODES: Denies enlarged lymph nodes.   CHEST: Denies chest pain or shortness of breath.   CARDIOVASCULAR: Denies palpitations or left sided chest pain.   ABDOMEN: No abdominal pain, constipation, diarrhea, nausea, vomiting or rectal bleeding.   URINARY: No frequency, dysuria, hematuria, or burning on urination.  REPRODUCTIVE: See HPI.   BREASTS: The patient performs breast self-examination " and denies pain, lumps, or nipple discharge.   HEMATOLOGIC: No easy bruisability or excessive bleeding.   MUSCULOSKELETAL: Denies joint pain or swelling.   NEUROLOGIC: Denies syncope or weakness.   PSYCHIATRIC: Denies depression, anxiety or mood swings.    PE:   APPEARANCE: Well nourished, well developed, in no acute distress.  AFFECT: WNL, alert and oriented x 3.  SKIN: No acne or hirsutism.  NECK: Neck symmetric without masses or thyromegaly.  NODES: No inguinal, cervical, axillary or femoral lymph node enlargement.  CHEST: Good respiratory effort.   ABDOMEN: Soft. No tenderness or masses. No hepatosplenomegaly. No hernias.  BREASTS: Symmetrical, no skin changes or visible lesions. No palpable masses, nipple discharge bilaterally.  PELVIC: Normal external female genitalia without lesions. Normal hair distribution. Adequate perineal body, normal urethral meatus. Vagina atrophic without lesions or discharge. Grade 2 cystocele no rectocele. Bimanual exam shows uterus and cervix to be surgically absent. Adnexa without masses or tenderness.      1. Encounter for gynecological examination without abnormal finding     2. Cystocele, midline      and PLAN:    Patient was counseled today on A.C.S. Pap guidelines and recommendations for yearly pelvic exams, mammograms and monthly self breast exams; to see her PCP for other health maintenance.     See gyn MD to discuss her cystocele and options for management

## 2019-05-20 ENCOUNTER — OFFICE VISIT (OUTPATIENT)
Dept: OBSTETRICS AND GYNECOLOGY | Facility: CLINIC | Age: 64
End: 2019-05-20
Payer: COMMERCIAL

## 2019-05-20 VITALS
SYSTOLIC BLOOD PRESSURE: 148 MMHG | DIASTOLIC BLOOD PRESSURE: 90 MMHG | HEIGHT: 63 IN | BODY MASS INDEX: 26.72 KG/M2 | WEIGHT: 150.81 LBS

## 2019-05-20 DIAGNOSIS — N81.11 CYSTOCELE, MIDLINE: ICD-10-CM

## 2019-05-20 DIAGNOSIS — R32 INCONTINENCE IN FEMALE: Primary | ICD-10-CM

## 2019-05-20 DIAGNOSIS — N76.0 ACUTE VAGINITIS: ICD-10-CM

## 2019-05-20 LAB
BILIRUB UR QL STRIP: NEGATIVE
CLARITY UR: CLEAR
COLOR UR: YELLOW
GLUCOSE UR QL STRIP: NEGATIVE
HGB UR QL STRIP: NEGATIVE
KETONES UR QL STRIP: NEGATIVE
LEUKOCYTE ESTERASE UR QL STRIP: ABNORMAL
MICROSCOPIC COMMENT: NORMAL
NITRITE UR QL STRIP: NEGATIVE
PH UR STRIP: 8 [PH] (ref 5–8)
PROT UR QL STRIP: NEGATIVE
RBC #/AREA URNS HPF: 1 /HPF (ref 0–4)
SP GR UR STRIP: 1.01 (ref 1–1.03)
SQUAMOUS #/AREA URNS HPF: 2 /HPF
URN SPEC COLLECT METH UR: ABNORMAL
WBC #/AREA URNS HPF: 1 /HPF (ref 0–5)

## 2019-05-20 PROCEDURE — 87086 URINE CULTURE/COLONY COUNT: CPT

## 2019-05-20 PROCEDURE — 87480 CANDIDA DNA DIR PROBE: CPT

## 2019-05-20 PROCEDURE — 99999 PR PBB SHADOW E&M-EST. PATIENT-LVL III: CPT | Mod: PBBFAC,,, | Performed by: OBSTETRICS & GYNECOLOGY

## 2019-05-20 PROCEDURE — 81000 URINALYSIS NONAUTO W/SCOPE: CPT

## 2019-05-20 PROCEDURE — 99213 PR OFFICE/OUTPT VISIT, EST, LEVL III, 20-29 MIN: ICD-10-PCS | Mod: S$GLB,,, | Performed by: OBSTETRICS & GYNECOLOGY

## 2019-05-20 PROCEDURE — 87510 GARDNER VAG DNA DIR PROBE: CPT

## 2019-05-20 PROCEDURE — 99999 PR PBB SHADOW E&M-EST. PATIENT-LVL III: ICD-10-PCS | Mod: PBBFAC,,, | Performed by: OBSTETRICS & GYNECOLOGY

## 2019-05-20 PROCEDURE — 99213 OFFICE O/P EST LOW 20 MIN: CPT | Mod: S$GLB,,, | Performed by: OBSTETRICS & GYNECOLOGY

## 2019-05-20 NOTE — PROGRESS NOTES
Subjective:       Patient ID: Kacy Blackwell is a . female.    Chief Complaint: exec phys    HPIhere for exec phys;   Hypertensionhyperalod saw specialist;saw castro haile and efren deferred: blood pressures at home normal. Tolerating medicine.   osteopor shad fosamax      Insomnia hx prn ambien:#21 lasts 5 months not due  hyperchol shad med    Chronic cough x yrs. Saw dr chambers note reviewed;saw pulm some yrs ago.     Past Medical History:   Diagnosis Date    Chronic cough     controlled w flonase/zyrtec    Hyperaldosteronism     dr cummings    Hyperlipidemia     Hypertension     Hypokalemia     Osteoporosis 12/16 naeem 12/18    Seasonal allergies      Past Surgical History:   Procedure Laterality Date    cyst removed from head      HYSTERECTOMY  1980    cervix absent     Family History   Problem Relation Age of Onset    Hypertension Mother     Arthritis Mother     Diabetes Mother     Heart failure Mother     Hypertension Father     Arthritis Father     Cancer Sister     Hypertension Brother     Diabetes Brother     Stroke Brother     Hypertension Sister     Breast cancer Neg Hx     Colon cancer Neg Hx     Ovarian cancer Neg Hx      Social History     Social History    Marital status:      Spouse name: N/A    Number of children: N/A    Years of education: N/A     Social History Main Topics    Smoking status: Never Smoker    Smokeless tobacco: Never Used    Alcohol use Yes      Comment: rare social drinker    Drug use: No    Sexual activity: Yes     Partners: Male     Other Topics Concern    None     Social History Narrative    . husb was with [employer]    She is retired bathtub/shower . Interior design via [employer]       Review of Systems  Cardiovascular: no chest pain  Chest: no shortness of breath  Abd: no abd pain  Remainder review of systems negative  No urin sympt  Objective:      Physical Exam   Constitutional: She is oriented to person, place, and  time. She appears well-developed and well-nourished. No distress.   HENT:   Head: Atraumatic.   Right Ear: External ear normal.   Left Ear: External ear normal.   Nose: Nose normal.   Mouth/Throat: Oropharynx is clear and moist. No oropharyngeal exudate.   bilat tms nl   Eyes: Conjunctivae and EOM are normal. Pupils are equal, round, and reactive to light. No scleral icterus.   Neck: Normal range of motion. Neck supple. No thyromegaly present.   Cardiovascular: Normal rate, regular rhythm and normal heart sounds.    No murmur heard.  Pulmonary/Chest: Effort normal and breath sounds normal. No respiratory distress. She has no wheezes. She has no rales.   Abdominal: Soft. Bowel sounds are normal. She exhibits no distension and no mass. There is no hepatosplenomegaly. There is no tenderness. There is no rebound and no guarding.   Musculoskeletal: Normal range of motion. She exhibits no edema or tenderness.   Lymphadenopathy:     She has no cervical adenopathy.   Neurological: She is alert and oriented to person, place, and time. No cranial nerve deficit. She exhibits normal muscle tone. Coordination normal.   Skin: Skin is warm. No rash noted. No erythema. No pallor.   Psychiatric: She has a normal mood and affect. Her behavior is normal. Judgment and thought content normal.   Nursing note and vitals reviewed.      Assessment:       1. Routine health maintenance    2. HTN (hypertension), benign /hyperaldost     Chronic cough  Plan:     f/u dr dick. When due    Letter dict :[id number]  Routine health maintenance    Osteopenia, unspecified location    Other orders  -     fluticasone propionate (FLONASE) 50 mcg/actuation nasal spray; 2 sprays (100 mcg total) by Each Nare route once daily.  Dispense: 16 g; Refill: 11  -     montelukast (SINGULAIR) 10 mg tablet; Take 1 tablet (10 mg total) by mouth every evening.  Dispense: 30 tablet; Refill: 5    Try singulair or flonase

## 2019-05-20 NOTE — PROGRESS NOTES
Subjective:      Kacy Blackwell is a 63 y.o. female who presents for evaluation of a cystocele. Problem started several months ago. Symptoms include: urinary incontinence: mild and discomfort: mild. Symptoms have waxed and waned.    Patient was told that her bladder dropped by Zahra, our nurse practiconer.  Patient states that when she coughs she urinates.  Patient has a chronic cough, that she has had for years.  She only leaks when she has a cough.   When the cough goes away, no leakage.  Patient does not cough when she does yard work, (weeding, riding her ).  She does not leak when she lifts up heavy things.  Patient also has painful sexual intercourse.  Patient first noticed it 15 years ago.   is  for 7 years, no intercourse since then.  It was painful when she had her gynecological exam, had discomfort for the entire week after that.  Patient has noticed for the last 5 years.  Patient has been evaluated by several specialists for her cough, only thing that was positve was allergies to dust mites. When she doesn't cough no leak.  Allergy shot this year did not work.  Last year, allergy shot worked, did not leak.  Cough came back when allergy symptoms are bad.  No vaginal pain when not having intercourse.              Menstrual History:  OB History        3    Para   1    Term   1            AB   1    Living   1       SAB        TAB        Ectopic        Multiple        Live Births                      No LMP recorded. Patient has had a hysterectomy.           Review of Systems  Constitutional: negative  Eyes: negative  Ears, nose, mouth, throat, and face: negative  Respiratory: negative  Cardiovascular: negative  Gastrointestinal: negative  Genitourinary:negative  Integument/breast: negative  Hematologic/lymphatic: negative  Musculoskeletal:negative  Neurological: negative  Behavioral/Psych: negative  Endocrine: negative  Allergic/Immunologic: negative     Objective:  "      BP (!) 148/90   Ht 5' 3" (1.6 m)   Wt 68.4 kg (150 lb 12.7 oz)   BMI 26.71 kg/m²   Pelvis:  External genitalia: normal general appearance  Urinary system: urethral meatus normal  Vaginal: normal without tenderness, induration or masses and cystocele present, stage II cystocele, hypermobile urethra, no leakage  Cervix: removed surgically  Adnexa: normal bimanual exam  Uterus: removed surgically  Rectal: no masses     yellow vaginal discharge noted, slight erythma    Assessment:      The patient has a cystocele     Plan:      Discussed cystoceles/rectoceles and management options with the patient.  All questions answered.  will refer to pelvic floor therapy with PT.      Will encourage investigation with cause of chronic cough as patient does not have any stress incontinence symptoms when she is not coughing.   Vaginitis culture taken  U/A, urine culture taken.   "

## 2019-05-20 NOTE — PATIENT INSTRUCTIONS
Atrophic Vaginitis    Atrophic vaginitis means the walls of your vagina have become thin. This happens when your body makes too little of the hormone estrogen. Menopause or surgical removal of the ovaries are the most common causes for a drop in estrogen. Breastfeeding can also cause the hormone level to drop.  Symptoms of atrophic vaginitis include:  · Dryness, soreness, burning, or itching in the vagina  · Vaginal discharge  Sex can be uncomfortable, even painful. After sex, you may have bleeding from your vagina. You may also have burning or pain when you urinate.  Home care  Your healthcare provider may recommend 1 or more of these as treatment:  · Vaginal creams, lotions, and lubricants. These products help relieve vaginal dryness. They dont need a prescription. They can be found in the personal care section of most pharmacies. Creams and lotions are used daily to help keep the vagina moist. Lubricants help reduce dryness and pain during sex. Choose water-based lubricants. Dont use petroleum jelly, mineral oil, or other oils. These increase the chance of infection.   · Hormone therapy (HT). HT increases the amount of estrogen in your body. This can help manage or relieve symptoms. HT can be given in pill form. It may be given as a lotion, cream, ring put into the vagina, or a patch on the skin. The risks and benefits of HT vary for each woman. For instance, your risk may be higher if you have had breast cancer. Discuss this treatment with your healthcare provider. Not every woman can use HT.  You dont need to give up (abstain from) sex. In fact, regular sex can help keep vaginal tissues healthy. Take steps to make sex more comfortable by using water-based lubricants.  Preventing infections  Atrophic vaginitis makes an infection of the vagina or the urinary tract more likely. To help reduce your risk:  · Keep your genitals clean. When you bathe, wash the outside of your vagina with mild soap and water.  Clean gently between the folds of your vagina.  · Wipe from front to back after a bowel movement.  · Dont douche unless your healthcare provider tells you to.  · Avoid scented toilet paper, scented vaginal sprays, and scented tampons.  · Avoid wearing clothes that are tight in the crotch. These include pantyhose, jeans, and leggings. Wear cotton underwear. Change it every day.  Follow-up care  Follow up with your healthcare provider, or as advised.  When to seek medical advice  Call your health care provider right away if any of these occur:  · Fever of 100.4°F (38°C) or higher, or as directed by your healthcare provider  · Symptoms dont go away or get worse even with treatment  · Vaginal area swells or becomes painful  · Vaginal area bleeds, but not because of your period  · Bad-smelling discharge from the vagina  · Pain or burning when you urinate or you have trouble passing urine  · Open sores develop around vagina   Date Last Reviewed: 12/1/2016  © 6486-8176 [employer]. [address] [place] [address], [place] [address]. All rights reserved. This information is not intended as a substitute for professional medical care. Always follow your healthcare professional's instructions.

## 2019-05-21 LAB
BACTERIAL VAGINOSIS DNA: NEGATIVE
CANDIDA GLABRATA DNA: NEGATIVE
CANDIDA KRUSEI DNA: NEGATIVE
CANDIDA RRNA VAG QL PROBE: NEGATIVE
T VAGINALIS RRNA GENITAL QL PROBE: NEGATIVE

## 2019-05-22 LAB — BACTERIA UR CULT: NORMAL

## 2019-06-03 ENCOUNTER — CLINICAL SUPPORT (OUTPATIENT)
Dept: REHABILITATION | Facility: HOSPITAL | Age: 64
End: 2019-06-03
Attending: OBSTETRICS & GYNECOLOGY
Payer: COMMERCIAL

## 2019-06-03 DIAGNOSIS — R32 INCONTINENCE IN FEMALE: Primary | ICD-10-CM

## 2019-06-03 PROCEDURE — 97161 PT EVAL LOW COMPLEX 20 MIN: CPT | Performed by: PHYSICAL THERAPIST

## 2019-06-03 PROCEDURE — 97535 SELF CARE MNGMENT TRAINING: CPT | Performed by: PHYSICAL THERAPIST

## 2019-06-03 NOTE — PROGRESS NOTES
OUTPATIENT PHYSICAL THERAPY EVALUATION        Patient: Kacy Blackwell   [facility] #:  [id number]    Date of treatment: 06/03/2019   Time in:100  Time out: 200  Billable time:   # Visits: 1/30  Auth:yes  POC expiration: 12/31/19      HISTORY      Kacy is a 63 y.o. female evaluated on 06/03/2019    Physician:  Cem Orona MD   Diagnosis:   Encounter Diagnosis   Name Primary?    Incontinence in female Yes      Treatment ordered: Physical Therapy  Medical History:   Past Medical History:   Diagnosis Date    Chronic cough     controlled w flonase/zyrtec    Hyperaldosteronism     dr cummings/marcello    Hyperlipidemia     Hypertension     Hypokalemia     Osteopenia     Seasonal allergies       Surgical History:   Past Surgical History:   Procedure Laterality Date    cyst removed from head      HYSTERECTOMY  1980    cervix absent      Medications:   Current Outpatient Medications   Medication Sig    aMILoride (MIDAMOR) 5 MG Tab Take 1 tablet (5 mg total) by mouth once daily.    BIOTIN ORAL Take 10,000 mg by mouth once daily.     calcium carbonate-vit D3-min (CALTRATE 600+D PLUS MINERALS) 600 mg calcium- 400 unit Tab Take 1 tablet by mouth once daily.    cetirizine (ZYRTEC) 10 MG tablet Take 10 mg by mouth once daily.    fluticasone propionate (FLONASE) 50 mcg/actuation nasal spray 2 sprays (100 mcg total) by Each Nare route once daily.    metoprolol succinate (TOPROL-XL) 100 MG 24 hr tablet Take 1 tablet (100 mg total) by mouth once daily.    montelukast (SINGULAIR) 10 mg tablet Take 1 tablet (10 mg total) by mouth every evening.    multivitamin capsule Take 1 capsule by mouth once daily.    peg 400-propylene glycol, PF, (SYSTANE, PF,) 0.4-0.3 % Dpet Apply to eye.    potassium chloride SA (K-DUR,KLOR-CON) 20 MEQ tablet Take 1 tablet (20 mEq total) by mouth 2 (two) times daily.    rosuvastatin (CRESTOR) 5 MG tablet Take 1 tablet (5 mg total) by mouth nightly.    zolpidem (AMBIEN) 5 MG Tab Take 1  tablet (5 mg total) by mouth nightly as needed.     No current facility-administered medications for this visit.        Allergies:   Review of patient's allergies indicates:   Allergen Reactions    Diovan [valsartan]      angioedema        Precautions:     OB/GYN History:     Bladder/Bowel History: urinary incontinence and constipation/straining for movement      SUBJECTIVE     Date of onset: Pt reports since her menopause and has worsening through he yeas. Worst coughing fits  History of current complaint:   Patient's goals for therapy:   Pain: Patient reports 0/10, with 0 being the lowest and 10 being the highest.    Activities that cause symptoms: with cough/sneeze/laugh/yell    Previous treatment included no      Sexually active? Yes    Frequency of urination:   Daytime: every 2 hours           Nighttime: 1-2x week    Difficulty initiating urine stream: No  Urine stream: strong  Complete emptying: Yes  Bladder leakage: Yes  Frequency of incidents: only when coughing  Amount leaked (urine): drops    Frequency of bowel movements: once a day  Difficulty initiating BM: No  Quality/Shape of BM: type 4  Colon leakage: No  Frequency of incidents: 0   Amount leaked (bowels): streaking/staining  Form of protection: pad  Number of pads required in 24 hours: 1-2      Types of fluid intake: water  Diet:good diet  Current exercise: no    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    OBJECTIVE     ORTHO SCREEN  Posture: WNL  Pelvic alignment: no sign of deviations noted in supine    ABDOMINALS  Scarring: none  Abdominal strength: Rectus: 3/5       TA: 3/5  Chaperone: refused  Consent signed: Yes    VAGINAL PELVIC FLOOR EXAM    EXTERNAL ASSESSMENT  Introitus: gaping  Skin condition: WNL  Scarring: none   Sensation: WNL   Pain:  none  Voluntary contraction: nil and accessory muscle use  Voluntary relaxation: visible drop  Involuntary contraction: nil  Bearing down: bulge, prolapse and reflex  tightening  Perineal descent: absent      INTERNAL ASSESSMENT  Pain: none   Sensation: WNL  Vaginal vault: roomy   Muscle Bulk: atrophy   Muscle Power: 3/5  Muscle Endurance:  sec  # Reps To Fatigue:     Fast Contractions:      Quality of contraction: decreased hold and incomplete relaxation   Specificity: patient contracts: gluteal and add   Coordination: tends to hold breath during PFM contration, cannot isolate PFM from abdominals and cannot co-contract PFM and abdominals   Prolapse check:redundant tissue noted posterior and anterior  Comments:         10% disability on PFDI    TREATMENT      Education: instructed on general anatomy/physiology of urinary/bowel system; discussed plan of care with patient and parent/guardian; instructed in benefits/risks of treatment; instructed in alternative methods of treatment; instructed in risks of refusing treatment; patient a parent agreed to treatment plan.     Also educated in: bladder irritants, anatomy/physiology of pelvic floor, bladder retraining and kegels    ASSESSMENT      This is a 63 y.o. female referred to outpatient physical therapy and presents with a medical diagnosis of Urinary incontience. Patient will benefit from skilled physical therapy to improve coordination of pelvic floor mm      Educational/Spiritual/Cultural needs: none  Abuse/Neglect: no signs  Nutritional Status: WDWN   Fall Risk: no    Pt's spiritual, cultural and educational needs considered and pt agreeable to plan of care and goals as stated below:     Medical necessity is demonstrated by the following IMPAIRMENTS/PROMBLEMS     History  Co-morbidities and personal factors that may impact the plan of care Examination  Body Structures and Functions, activity limitations and participation restrictions that may impact the plan of care Clinical Presentation   Decision Making/ Complexity Score   Co-morbidities:                 Personal Factors:    Body Regions/Systems/Functions:    altered posture,  poor knowledge of body mechanics and posture, poor trunk stability, decreased pelvic muscle strength, decreased endurance of the pelvic muscles, decreased phasic ability of the pelvic muscles, poor quality of pelvic muscle contraction, increased frequency of urination, increased nocturia, poor coordination of pelvic floor muscles during ADL's leading to urinary or fecal leakage and unable to co-contract or co-relax abdominal wall and pelvic floor muscles           Activity limitations:   Barriers to Learning: none  Environmental Barriers: none noted    Participation Restrictions:           low           PLAN    Frequency: 1-2 week  Duration: 6-8 weeks  Short Term Goals: 1-2weeks   Pt will verbalize improved awareness of PFM activity as palpated by PT in order to improve activity involvement with HEP.    Long Term Goals: 6-8 weeks   Pt will report improved ability to cough/sneeze/laugh/yell with little (drops) to no urinary leakage 7/7 days per week.   Pt will report improved ability to sleep uninterrupted by excessive nocturia 5/7 days per week.   Pt will report a decrease in pad use to 0-1 pads per day.  Pt will maintain a voiding interval of > or = 3 hours for improved activity tolerance (ie. prolonged driving, work meeting, watching a movie).   Pt will urinate without crede/Valsalva to prevent adverse effects to adjacent structures.    Physical therapy will include: therapeutic exercises, neuromuscular re-education, manual therapy, modalities PRN, patient/family education and self care/home management      Therapist: Jessica Willams, PT  6/3/2019

## 2019-06-10 ENCOUNTER — CLINICAL SUPPORT (OUTPATIENT)
Dept: REHABILITATION | Facility: HOSPITAL | Age: 64
End: 2019-06-10
Attending: OBSTETRICS & GYNECOLOGY
Payer: COMMERCIAL

## 2019-06-10 DIAGNOSIS — N18.30 CHRONIC KIDNEY DISEASE (CKD), STAGE III (MODERATE): Primary | ICD-10-CM

## 2019-06-10 DIAGNOSIS — R32 INCONTINENCE IN FEMALE: Primary | ICD-10-CM

## 2019-06-10 PROCEDURE — 97112 NEUROMUSCULAR REEDUCATION: CPT | Performed by: PHYSICAL THERAPIST

## 2019-06-10 PROCEDURE — 97535 SELF CARE MNGMENT TRAINING: CPT | Performed by: PHYSICAL THERAPIST

## 2019-06-10 PROCEDURE — 97014 ELECTRIC STIMULATION THERAPY: CPT | Performed by: PHYSICAL THERAPIST

## 2019-06-10 NOTE — PROGRESS NOTES
OUTPATIENT PHYSICAL THERAPY DAILY NOTE       Patient: Kacy Blackwell   [facility] #:  [id number]    Diagnosis:   Encounter Diagnosis   Name Primary?    Incontinence in female Yes      Date of treatment: 06/10/2019     Time in: 105  Time out: 200  Billable time:55 min  Visit #: 2/  Auth: yes  POC expiration:     SUBJECTIVE   Pt reports: no changes since last visit, but have been trying to do the exercises  Pain: /10 on VAS scale  Pain location:     OBJECTIVE       Neuromuscular Re-education to develop improved coordination  for 25 minutes including:  Kegels with assist of SEMG and bearing down with abdominal release: 20 reps each and holds 10 reps each for 2 sec  kegels with ESTIM at 50 mhz 5 sec on and 10 sec off for 15 min        Modalities attended estim to PF mm for 15 min  Education: Discussed progression of plan of care with patient; educated pt in activity modification; reviewed HEP with pt. Pt demonstrated and verbalized understanding of all instruction 10 min  ASSESSMENT      Pt demo improved strength to 3/5 today with less TC and VC for breath, gluteal and adductor contractions    PLAN     Continue with established Plan of Care, working toward established PT goals.

## 2019-06-24 ENCOUNTER — CLINICAL SUPPORT (OUTPATIENT)
Dept: REHABILITATION | Facility: HOSPITAL | Age: 64
End: 2019-06-24
Attending: OBSTETRICS & GYNECOLOGY
Payer: COMMERCIAL

## 2019-06-24 DIAGNOSIS — R32 INCONTINENCE IN FEMALE: Primary | ICD-10-CM

## 2019-06-24 PROCEDURE — 97032 APPL MODALITY 1+ESTIM EA 15: CPT | Performed by: PHYSICAL THERAPIST

## 2019-06-24 PROCEDURE — 97112 NEUROMUSCULAR REEDUCATION: CPT | Performed by: PHYSICAL THERAPIST

## 2019-06-24 NOTE — PROGRESS NOTES
OUTPATIENT PHYSICAL THERAPY DAILY NOTE       Patient: Kacy Blackwell   [facility] #:  [id number]    Diagnosis:   Encounter Diagnosis   Name Primary?    Incontinence in female Yes      Date of treatment: 06/24/2019     Time in: 105  Time out: 200  Billable time:55 min  Visit #: 3/  Auth: yes  POC expiration:     SUBJECTIVE   Pt reports: I have not been coughing so I have not been leaking  Pain: /10 on VAS scale  Pain location:     OBJECTIVE       Neuromuscular Re-education to develop improved coordination  for 25 minutes including:  Kegels with assist of SEMG and bearing down with abdominal release: 20 reps each and holds 10 reps each for 2 sec  kegels with ESTIM at 50 mhz 5 sec on and 10 sec off for 15 min        Modalities attended estim to PF mm for 15 min  Education: Discussed progression of plan of care with patient; educated pt in activity modification; reviewed HEP with pt. Pt demonstrated and verbalized understanding of all instruction 10 min  ASSESSMENT      Pt demo improved strength to 3/5 today with less TC and VC for breath, gluteal and adductor contractions. Holds at 2 sec    PLAN     Continue with established Plan of Care, working toward established PT goals.

## 2019-07-09 RX ORDER — ZOLPIDEM TARTRATE 5 MG/1
5 TABLET ORAL NIGHTLY PRN
Qty: 30 TABLET | Refills: 5 | Status: SHIPPED | OUTPATIENT
Start: 2019-07-09 | End: 2020-03-17 | Stop reason: SDUPTHER

## 2019-07-15 ENCOUNTER — OFFICE VISIT (OUTPATIENT)
Dept: NEPHROLOGY | Facility: CLINIC | Age: 64
End: 2019-07-15
Payer: COMMERCIAL

## 2019-07-15 VITALS
BODY MASS INDEX: 26.8 KG/M2 | HEART RATE: 68 BPM | HEIGHT: 63 IN | SYSTOLIC BLOOD PRESSURE: 128 MMHG | WEIGHT: 151.25 LBS | DIASTOLIC BLOOD PRESSURE: 74 MMHG

## 2019-07-15 DIAGNOSIS — I10 HTN (HYPERTENSION), BENIGN: Primary | ICD-10-CM

## 2019-07-15 PROCEDURE — 99214 OFFICE O/P EST MOD 30 MIN: CPT | Mod: S$GLB,,, | Performed by: INTERNAL MEDICINE

## 2019-07-15 PROCEDURE — 99999 PR PBB SHADOW E&M-EST. PATIENT-LVL III: CPT | Mod: PBBFAC,,, | Performed by: INTERNAL MEDICINE

## 2019-07-15 PROCEDURE — 99999 PR PBB SHADOW E&M-EST. PATIENT-LVL III: ICD-10-PCS | Mod: PBBFAC,,, | Performed by: INTERNAL MEDICINE

## 2019-07-15 PROCEDURE — 99214 PR OFFICE/OUTPT VISIT, EST, LEVL IV, 30-39 MIN: ICD-10-PCS | Mod: S$GLB,,, | Performed by: INTERNAL MEDICINE

## 2019-07-15 NOTE — PROGRESS NOTES
Subjective:       Patient ID: Kacy Blackwell is a 63 y.o.   female who presents for follow-up evaluation of HTN , CKD stage 2/3         Gurinder Pierce MD      HPI  : Ms Kacy Blackwell is a 63 -year-old AA woman seen in office today in f/u  for above medical problems .  I saw her in clinic about 6 months ago.  Patient was eval by  endocrinology for  primary hyperaldosteronism.  Serum renin/Ezra ratio is significantly elevated.  Next up in the workup include adrenal vein sampling, CT scan of the abdomen shows normal adrenal glands.  Patient did not want to proceed with any invasive testing and wanted to continue management of her condition with medications alone.   Her mother also has hypertension and is on about 8 blood pressure medications, she is 97.   Patient has chronic hypokalemia dating back to 2009. She was started on potassium supplements.  Patient was initially started on Aldactone. Later  switched to triamterene secondary to concerns of side effects of aldactone, and now Amiloride due to insurance coverage ,        Past Medical History:   Diagnosis Date    Chronic cough     controlled w flonase/zyrtec    Hyperaldosteronism     dr cummings/marcello    Hyperlipidemia     Hypertension     Hypokalemia     Osteopenia     Seasonal allergies        Current Outpatient Medications on File Prior to Visit   Medication Sig Dispense Refill    aMILoride (MIDAMOR) 5 MG Tab Take 1 tablet (5 mg total) by mouth once daily. 30 tablet 11    BIOTIN ORAL Take 10,000 mg by mouth once daily.       calcium carbonate-vit D3-min (CALTRATE 600+D PLUS MINERALS) 600 mg calcium- 400 unit Tab Take 1 tablet by mouth once daily.      cetirizine (ZYRTEC) 10 MG tablet Take 10 mg by mouth once daily.      dextromethorphan-guaifenesin  mg (MUCINEX DM)  mg per 12 hr tablet Take 1 tablet by mouth once daily.      fluticasone propionate (FLONASE) 50 mcg/actuation nasal spray 2 sprays (100 mcg total)  by Each Nare route once daily. 16 g 11    metoprolol succinate (TOPROL-XL) 100 MG 24 hr tablet Take 1 tablet (100 mg total) by mouth once daily. 30 tablet 11    multivitamin capsule Take 1 capsule by mouth once daily.      peg 400-propylene glycol, PF, (SYSTANE, PF,) 0.4-0.3 % Dpet Apply to eye.      potassium chloride SA (K-DUR,KLOR-CON) 20 MEQ tablet Take 1 tablet (20 mEq total) by mouth 2 (two) times daily. 60 tablet 11    rosuvastatin (CRESTOR) 5 MG tablet Take 1 tablet (5 mg total) by mouth nightly. 30 tablet 11    zolpidem (AMBIEN) 5 MG Tab Take 1 tablet (5 mg total) by mouth nightly as needed. 30 tablet 5     No current facility-administered medications on file prior to visit.        Review of Systems  :      Constitutional: Negative for activity change, appetite change, fatigue and fever.   HENT: Negative for congestion, facial swelling, sore throat, trouble swallowing and voice change.    Eyes: Negative for redness and visual disturbance.   Respiratory: Negative for apnea, chest tightness, shortness of breath and wheezing.  cough off and on,   Cardiovascular: Negative for  palpitations and leg swelling. She has been having some angina at rest,  Gastrointestinal: Negative for abdominal distention, abdominal pain, blood in stool, constipation, diarrhea, nausea and vomiting.   Genitourinary: Negative for decreased urine volume, difficulty urinating, dysuria, flank pain, frequency, hematuria, pelvic pain and urgency.   Musculoskeletal: Negative for back pain, gait problem and joint swelling.   Skin: Negative for color change and rash.   Neurological: Negative for dizziness, syncope, weakness and headaches.   Hematological: Does not bruise/bleed easily.   Psychiatric/Behavioral: Negative for agitation, behavioral problems and confusion. The patient is not nervous/anxious.        Objective:         Vitals:    07/15/19 1516   BP: 128/74   Pulse: 68       Weight 151 lbs , last weight 154 lbs       Physical  Exam  :      Constitutional: She is oriented to person, place, and time. She appears well-developed and well-nourished. No distress.   HENT: Head: Normocephalic and atraumatic. Mouth/Throat: Oropharynx is clear and moist. No oropharyngeal exudate.   Eyes: Conjunctivae normal and EOM are normal. Pupils are equal, round, and reactive to light. No scleral icterus.   Neck: Normal range of motion. Neck supple. No JVD present. Carotid bruit is not present. No tracheal deviation present. No mass and no thyromegaly present.   Cardiovascular: Normal rate, regular rhythm, normal heart sounds and intact distal pulses. Exam reveals no gallop and no friction rub.   No murmur heard.  Pulmonary/Chest: Effort normal and breath sounds normal. No respiratory distress. She has no wheezes. She has no rales. She exhibits no tenderness.   Abdominal: Soft. Bowel sounds are normal. She exhibits no distension, no abdominal bruit, no ascites and no mass. There is no hepatosplenomegaly. There is no tenderness. There is no rebound, no guarding and no CVA tenderness.   Musculoskeletal: Normal range of motion. She exhibits no edema and no tenderness.   Lymphadenopathy: She has no cervical adenopathy.   Neurological: She is alert and oriented to person, place, and time. She has normal reflexes. No cranial nerve deficit. She exhibits normal muscle tone. Coordination normal.   Skin: Skin is warm and intact. No rash noted. No erythema. No pallor.   Psychiatric: She has a normal mood and affect. Her behavior is normal. Judgment normal.          Labs:    Lab Results   Component Value Date    CREATININE 1.1 05/06/2019    BUN 17 05/06/2019     05/06/2019    K 3.9 05/06/2019     05/06/2019    CO2 29 05/06/2019       Lab Results   Component Value Date    WBC 6.91 05/06/2019    HGB 12.8 05/06/2019    HCT 41.8 05/06/2019    MCV 95 05/06/2019     05/06/2019       Lab Results   Component Value Date    CALCIUM 10.1 05/06/2019    PHOS 3.5  02/08/2019       Lab Results   Component Value Date    HGBA1C 5.7 05/03/2013     Lab Results   Component Value Date    ALBUMIN 3.9 05/06/2019       63 -year-old  woman seen office today in f/u for following medical problems ,      1.  Hypertension - blood pressure is controlled,  target blood pressure less than 130/80,  Toprol- mg daily, continue amiloride 5 mg daily,     2.  Primary hyperaldosteronism. CAT scan of the abdomen shows normal adrenal glands.  Continue amiloride and potassium supplements,      Also she is on potassium chloride 20 mEq twice a day.    patient did not want to proceed with adrenal vein sampling,     3. CKD stage 2/3 : Stable renal function with a serum creatinine of 1.1 mg/dL, advised patient to avoid NSAID use.     4.  Recent urinalysis shows normal range proteinuria.     5.  Hypokalemia - continue potassium supplements and triamterene, discussed potassium rich diet,        We'll see her in followup in about 6 months.   more than 25 minutes of face-to-face time was spent with the patient discussing labs and plan of care.      Sincerely,      Cliff Barrera M.D.

## 2019-09-03 ENCOUNTER — TELEPHONE (OUTPATIENT)
Dept: ALLERGY | Facility: CLINIC | Age: 64
End: 2019-09-03

## 2019-09-03 ENCOUNTER — OFFICE VISIT (OUTPATIENT)
Dept: ALLERGY | Facility: CLINIC | Age: 64
End: 2019-09-03
Payer: COMMERCIAL

## 2019-09-03 ENCOUNTER — HOSPITAL ENCOUNTER (OUTPATIENT)
Dept: RADIOLOGY | Facility: HOSPITAL | Age: 64
Discharge: HOME | End: 2019-09-03
Attending: ALLERGY & IMMUNOLOGY
Payer: COMMERCIAL

## 2019-09-03 VITALS
HEART RATE: 74 BPM | TEMPERATURE: 98 F | RESPIRATION RATE: 15 BRPM | HEIGHT: 63 IN | BODY MASS INDEX: 27.23 KG/M2 | DIASTOLIC BLOOD PRESSURE: 80 MMHG | WEIGHT: 153.69 LBS | SYSTOLIC BLOOD PRESSURE: 120 MMHG

## 2019-09-03 DIAGNOSIS — J31.0 CHRONIC RHINITIS: ICD-10-CM

## 2019-09-03 DIAGNOSIS — R51.9 FACIAL PAIN: ICD-10-CM

## 2019-09-03 DIAGNOSIS — R05.9 COUGH: ICD-10-CM

## 2019-09-03 DIAGNOSIS — R05.9 COUGH: Primary | ICD-10-CM

## 2019-09-03 PROCEDURE — 70220 XR SINUSES MIN 3 VIEWS: ICD-10-PCS | Mod: 26,,, | Performed by: RADIOLOGY

## 2019-09-03 PROCEDURE — 71046 XR CHEST PA AND LATERAL: ICD-10-PCS | Mod: 26,,, | Performed by: RADIOLOGY

## 2019-09-03 PROCEDURE — 99215 OFFICE O/P EST HI 40 MIN: CPT | Mod: 25,S$GLB,, | Performed by: ALLERGY & IMMUNOLOGY

## 2019-09-03 PROCEDURE — 71046 X-RAY EXAM CHEST 2 VIEWS: CPT | Mod: 26,,, | Performed by: RADIOLOGY

## 2019-09-03 PROCEDURE — 96372 THER/PROPH/DIAG INJ SC/IM: CPT | Mod: S$GLB,,, | Performed by: ALLERGY & IMMUNOLOGY

## 2019-09-03 PROCEDURE — 99999 PR PBB SHADOW E&M-EST. PATIENT-LVL IV: CPT | Mod: PBBFAC,,, | Performed by: ALLERGY & IMMUNOLOGY

## 2019-09-03 PROCEDURE — 70220 X-RAY EXAM OF SINUSES: CPT | Mod: TC

## 2019-09-03 PROCEDURE — 70220 X-RAY EXAM OF SINUSES: CPT | Mod: 26,,, | Performed by: RADIOLOGY

## 2019-09-03 PROCEDURE — 71046 X-RAY EXAM CHEST 2 VIEWS: CPT | Mod: TC

## 2019-09-03 PROCEDURE — 99215 PR OFFICE/OUTPT VISIT, EST, LEVL V, 40-54 MIN: ICD-10-PCS | Mod: 25,S$GLB,, | Performed by: ALLERGY & IMMUNOLOGY

## 2019-09-03 PROCEDURE — 99999 PR PBB SHADOW E&M-EST. PATIENT-LVL IV: ICD-10-PCS | Mod: PBBFAC,,, | Performed by: ALLERGY & IMMUNOLOGY

## 2019-09-03 PROCEDURE — 96372 PR INJECTION,THERAP/PROPH/DIAG2ST, IM OR SUBCUT: ICD-10-PCS | Mod: S$GLB,,, | Performed by: ALLERGY & IMMUNOLOGY

## 2019-09-03 RX ORDER — BETAMETHASONE SODIUM PHOSPHATE AND BETAMETHASONE ACETATE 3; 3 MG/ML; MG/ML
9 INJECTION, SUSPENSION INTRA-ARTICULAR; INTRALESIONAL; INTRAMUSCULAR; SOFT TISSUE
Status: COMPLETED | OUTPATIENT
Start: 2019-09-03 | End: 2019-09-03

## 2019-09-03 RX ADMIN — BETAMETHASONE SODIUM PHOSPHATE AND BETAMETHASONE ACETATE 9 MG: 3; 3 INJECTION, SUSPENSION INTRA-ARTICULAR; INTRALESIONAL; INTRAMUSCULAR; SOFT TISSUE at 02:09

## 2019-09-03 NOTE — TELEPHONE ENCOUNTER
Patient check 15 minutes after receiving Celestone. No reaction noted.No complaint voiced.  
1 person assist

## 2019-09-26 ENCOUNTER — TELEPHONE (OUTPATIENT)
Dept: INTERNAL MEDICINE | Facility: CLINIC | Age: 64
End: 2019-09-26

## 2019-09-26 NOTE — TELEPHONE ENCOUNTER
----- Message from Rhiannon Thomas sent at 9/25/2019  9:14 AM CDT -----  Contact: Carmelita with [facility]  Type:  Pharmacy Calling to Clarify an RX    Name of Caller:  Carmelita  Pharmacy Name:    [id number] - [place], [place] - [address]  [address] [address]  Phone: [phone] Fax: [phone]  Prescription Name:  potassium chloride SA (K-DUR,KLOR-CON) 20 MEQ tablet  What do they need to clarify?:  Drug interaction with aMILoride (MIDAMOR) 5 MG Tab  Best Call Back Number:  [phone]    Additional Information:  na

## 2019-09-27 NOTE — TELEPHONE ENCOUNTER
Potassium Chloride has a Drug interaction with aMILoride (MIDAMOR) 5 MG Tab. Is it ok for patient to still take?

## 2019-11-12 ENCOUNTER — PATIENT MESSAGE (OUTPATIENT)
Dept: INTERNAL MEDICINE | Facility: CLINIC | Age: 64
End: 2019-11-12

## 2019-11-13 ENCOUNTER — TELEPHONE (OUTPATIENT)
Dept: INTERNAL MEDICINE | Facility: CLINIC | Age: 64
End: 2019-11-13

## 2019-11-13 DIAGNOSIS — Z12.31 ENCOUNTER FOR SCREENING MAMMOGRAM FOR MALIGNANT NEOPLASM OF BREAST: Primary | ICD-10-CM

## 2019-12-11 ENCOUNTER — HOSPITAL ENCOUNTER (OUTPATIENT)
Dept: RADIOLOGY | Facility: HOSPITAL | Age: 64
Discharge: HOME | End: 2019-12-11
Attending: FAMILY MEDICINE
Payer: MEDICARE

## 2019-12-11 VITALS — WEIGHT: 153.69 LBS | HEIGHT: 62 IN | BODY MASS INDEX: 28.28 KG/M2

## 2019-12-11 DIAGNOSIS — Z12.31 ENCOUNTER FOR SCREENING MAMMOGRAM FOR MALIGNANT NEOPLASM OF BREAST: ICD-10-CM

## 2019-12-11 PROCEDURE — 77063 MAMMO DIGITAL SCREENING BILAT WITH TOMOSYNTHESIS_CAD: ICD-10-PCS | Mod: 26,,, | Performed by: RADIOLOGY

## 2019-12-11 PROCEDURE — 77067 SCR MAMMO BI INCL CAD: CPT | Mod: TC

## 2019-12-11 PROCEDURE — 77067 MAMMO DIGITAL SCREENING BILAT WITH TOMOSYNTHESIS_CAD: ICD-10-PCS | Mod: 26,,, | Performed by: RADIOLOGY

## 2019-12-11 PROCEDURE — 77067 SCR MAMMO BI INCL CAD: CPT | Mod: 26,,, | Performed by: RADIOLOGY

## 2019-12-11 PROCEDURE — 77063 BREAST TOMOSYNTHESIS BI: CPT | Mod: 26,,, | Performed by: RADIOLOGY

## 2020-01-13 DIAGNOSIS — I10 HTN (HYPERTENSION), BENIGN: ICD-10-CM

## 2020-01-13 RX ORDER — AMILORIDE HYDROCHLORIDE 5 MG/1
5 TABLET ORAL DAILY
Qty: 30 TABLET | Refills: 9 | Status: SHIPPED | OUTPATIENT
Start: 2020-01-13 | End: 2020-01-31

## 2020-01-24 ENCOUNTER — LAB VISIT (OUTPATIENT)
Dept: LAB | Facility: HOSPITAL | Age: 65
End: 2020-01-24
Attending: INTERNAL MEDICINE
Payer: MEDICARE

## 2020-01-24 DIAGNOSIS — N18.30 CHRONIC KIDNEY DISEASE (CKD), STAGE III (MODERATE): ICD-10-CM

## 2020-01-24 DIAGNOSIS — I10 HTN (HYPERTENSION), BENIGN: ICD-10-CM

## 2020-01-24 LAB
BACTERIA #/AREA URNS HPF: ABNORMAL /HPF
BILIRUB UR QL STRIP: NEGATIVE
CLARITY UR: CLEAR
COLOR UR: YELLOW
CREAT UR-MCNC: 90 MG/DL (ref 15–325)
GLUCOSE UR QL STRIP: NEGATIVE
HGB UR QL STRIP: NEGATIVE
KETONES UR QL STRIP: NEGATIVE
LEUKOCYTE ESTERASE UR QL STRIP: ABNORMAL
MICROSCOPIC COMMENT: ABNORMAL
NITRITE UR QL STRIP: NEGATIVE
PH UR STRIP: 8 [PH] (ref 5–8)
PROT UR QL STRIP: NEGATIVE
PROT UR-MCNC: <7 MG/DL (ref 0–15)
PROT/CREAT UR: NORMAL MG/G{CREAT} (ref 0–0.2)
RBC #/AREA URNS HPF: 5 /HPF (ref 0–4)
SP GR UR STRIP: 1.01 (ref 1–1.03)
URN SPEC COLLECT METH UR: ABNORMAL
WBC #/AREA URNS HPF: 5 /HPF (ref 0–5)

## 2020-01-24 PROCEDURE — 81000 URINALYSIS NONAUTO W/SCOPE: CPT

## 2020-01-24 PROCEDURE — 84156 ASSAY OF PROTEIN URINE: CPT

## 2020-01-28 DIAGNOSIS — I10 HTN (HYPERTENSION), BENIGN: ICD-10-CM

## 2020-01-28 RX ORDER — METOPROLOL SUCCINATE 100 MG/1
100 TABLET, EXTENDED RELEASE ORAL DAILY
Qty: 30 TABLET | Refills: 11 | Status: SHIPPED | OUTPATIENT
Start: 2020-01-28 | End: 2021-01-19 | Stop reason: SDUPTHER

## 2020-01-31 ENCOUNTER — OFFICE VISIT (OUTPATIENT)
Dept: NEPHROLOGY | Facility: CLINIC | Age: 65
End: 2020-01-31
Payer: MEDICARE

## 2020-01-31 VITALS
SYSTOLIC BLOOD PRESSURE: 128 MMHG | DIASTOLIC BLOOD PRESSURE: 90 MMHG | HEART RATE: 60 BPM | HEIGHT: 62 IN | RESPIRATION RATE: 20 BRPM | BODY MASS INDEX: 27.75 KG/M2 | WEIGHT: 150.81 LBS

## 2020-01-31 DIAGNOSIS — Z82.49 FAMILY HISTORY OF EARLY CAD: Primary | ICD-10-CM

## 2020-01-31 DIAGNOSIS — I10 HTN (HYPERTENSION), BENIGN: ICD-10-CM

## 2020-01-31 PROCEDURE — 99999 PR PBB SHADOW E&M-EST. PATIENT-LVL III: CPT | Mod: PBBFAC,,, | Performed by: INTERNAL MEDICINE

## 2020-01-31 PROCEDURE — 99999 PR PBB SHADOW E&M-EST. PATIENT-LVL III: ICD-10-PCS | Mod: PBBFAC,,, | Performed by: INTERNAL MEDICINE

## 2020-01-31 PROCEDURE — 99214 OFFICE O/P EST MOD 30 MIN: CPT | Mod: S$PBB,,, | Performed by: INTERNAL MEDICINE

## 2020-01-31 PROCEDURE — 99214 PR OFFICE/OUTPT VISIT, EST, LEVL IV, 30-39 MIN: ICD-10-PCS | Mod: S$PBB,,, | Performed by: INTERNAL MEDICINE

## 2020-01-31 PROCEDURE — 99213 OFFICE O/P EST LOW 20 MIN: CPT | Mod: PBBFAC | Performed by: INTERNAL MEDICINE

## 2020-01-31 RX ORDER — AMILORIDE HYDROCHLORIDE 5 MG/1
10 TABLET ORAL DAILY
Qty: 60 TABLET | Refills: 11 | Status: SHIPPED | OUTPATIENT
Start: 2020-01-31 | End: 2021-02-11 | Stop reason: SDUPTHER

## 2020-01-31 NOTE — PATIENT INSTRUCTIONS
Avoid NSAID pain medications such as advil, aleve, motrin, ibuprofen, naprosyn, meloxicam, diclofenac, mobic.     Increase amiloride to 10 mg a day     Drink atleast 70 oz of water a day

## 2020-01-31 NOTE — PROGRESS NOTES
Subjective:       Patient ID: Kacy Blackwell is a 64 y.o.   female who presents for follow-up evaluation of HTN , CKD stage 2/3           Gurinder Pierce MD         HPI: Ms Kacy Blackwell is a 64 -year-old AA woman seen in office today in f/u  for above medical problems .  I saw her in clinic about 6 months ago.  Recent labs reviewed and discussed with the patient.  Stable renal function with a serum creatinine less than 1 mg/dL.  He has hypertension, currently on Amiloride and metoprolol, in 2017 patient had workup for some primary hyperaldosteronism, was eval by  endocrinology , Serum renin/Ezra ratio  significantly elevated.  Next up in the workup include adrenal vein sampling, CT scan of the abdomen showed  normal adrenal glands.  Patient did not want to proceed with any invasive testing and wanted to continue management of her condition with medications alone.   Her mother also has hypertension and is on about 8 blood pressure medications, she is 98.   Patient has chronic hypokalemia dating back to 2009. She was started on potassium supplements.  Patient was initially started on Aldactone. Later  switched to triamterene secondary to concerns of side effects of aldactone, and now Amiloride due to insurance coverage ,           Past Medical History:   Diagnosis Date    Chronic cough     controlled w flonase/zyrtec    Hyperaldosteronism     dr cummings/marcello    Hyperlipidemia     Hypertension     Hypokalemia     Osteopenia     Seasonal allergies        Current Outpatient Medications on File Prior to Visit   Medication Sig Dispense Refill    aMILoride (MIDAMOR) 5 MG Tab Take 1 tablet (5 mg total) by mouth once daily. 30 tablet 9    BIOTIN ORAL Take 10,000 mg by mouth once daily.       calcium carbonate-vit D3-min (CALTRATE 600+D PLUS MINERALS) 600 mg calcium- 400 unit Tab Take 1 tablet by mouth once daily.      cetirizine (ZYRTEC) 10 MG tablet Take 10 mg by mouth once daily.       dextromethorphan-guaifenesin  mg (MUCINEX DM)  mg per 12 hr tablet Take 1 tablet by mouth once daily.      fluticasone propionate (FLONASE) 50 mcg/actuation nasal spray 2 sprays (100 mcg total) by Each Nare route once daily. 16 g 11    metoprolol succinate (TOPROL-XL) 100 MG 24 hr tablet Take 1 tablet (100 mg total) by mouth once daily. 30 tablet 11    multivitamin capsule Take 1 capsule by mouth once daily.      peg 400-propylene glycol, PF, (SYSTANE, PF,) 0.4-0.3 % Dpet Apply to eye.      potassium chloride SA (K-DUR,KLOR-CON) 20 MEQ tablet Take 1 tablet (20 mEq total) by mouth 2 (two) times daily. 60 tablet 11    rosuvastatin (CRESTOR) 5 MG tablet Take 1 tablet (5 mg total) by mouth nightly. 30 tablet 11    zolpidem (AMBIEN) 5 MG Tab Take 1 tablet (5 mg total) by mouth nightly as needed. 30 tablet 5     No current facility-administered medications on file prior to visit.        Review of Systems   Constitutional: Negative for activity change, appetite change, fatigue and fever.   HENT: Negative for congestion, facial swelling, sore throat, trouble swallowing and voice change.    Eyes: Negative for redness and visual disturbance.   Respiratory: Negative for apnea, cough, chest tightness, shortness of breath and wheezing.    Cardiovascular: Negative for chest pain, palpitations and leg swelling.   Gastrointestinal: Negative for abdominal distention, abdominal pain, blood in stool, constipation, diarrhea, nausea and vomiting.   Genitourinary: Negative for decreased urine volume, difficulty urinating, dysuria, flank pain, frequency, hematuria, pelvic pain and urgency.   Musculoskeletal: Positive for arthralgias and back pain. Negative for gait problem and joint swelling.   Skin: Negative for color change and rash.   Neurological: Negative for dizziness, syncope, weakness and headaches.   Hematological: Does not bruise/bleed easily.   Psychiatric/Behavioral: Negative for agitation, behavioral  problems and confusion. The patient is not nervous/anxious.      :            Objective:           Vitals:    01/31/20 1601   BP: (!) 128/90   Pulse: 60   Resp: 20       Weight 150 lb, stable    Physical Exam  :    Constitutional: She is oriented to person, place, and time. She appears well-developed and well-nourished. No distress.   HENT: Head: Normocephalic and atraumatic. Mouth/Throat: Oropharynx is clear and moist. No oropharyngeal exudate.   Eyes: Conjunctivae normal and EOM are normal. Pupils are equal, round, and reactive to light. No scleral icterus.   Neck: Normal range of motion. Neck supple. No JVD present. Carotid bruit is not present. No tracheal deviation present. No mass and no thyromegaly present.   Cardiovascular: Normal rate, regular rhythm, normal heart sounds and intact distal pulses. Exam reveals no gallop and no friction rub.   No murmur heard.  Pulmonary/Chest: Effort normal and breath sounds normal. No respiratory distress. She has no wheezes. She has no rales. She exhibits no tenderness.   Abdominal: Soft. Bowel sounds are normal. She exhibits no distension, no abdominal bruit, no ascites and no mass. There is no hepatosplenomegaly. There is no tenderness. There is no rebound, no guarding and no CVA tenderness.   Musculoskeletal: Normal range of motion. She exhibits no edema and no tenderness.   Lymphadenopathy: She has no cervical adenopathy.   Neurological: She is alert and oriented to person, place, and time. She has normal reflexes. No cranial nerve deficit. She exhibits normal muscle tone. Coordination normal.   Skin: Skin is warm and intact. No rash noted. No erythema. No pallor.   Psychiatric: She has a normal mood and affect. Her behavior is normal. Judgment normal.            Labs:    Lab Results   Component Value Date    CREATININE 1.1 01/24/2020    BUN 19 01/24/2020     01/24/2020    K 3.9 01/24/2020     01/24/2020    CO2 31 (H) 01/24/2020       Lab Results    Component Value Date    WBC 5.84 01/24/2020    HGB 12.7 01/24/2020    HCT 42.2 01/24/2020    MCV 97 01/24/2020     01/24/2020         Lab Results   Component Value Date    CALCIUM 9.5 01/24/2020    PHOS 3.4 01/24/2020       Lab Results   Component Value Date    ALBUMIN 4.0 01/24/2020       Lab Results   Component Value Date    URICACID 5.9 (H) 12/01/2017       Lab Results   Component Value Date    HGBA1C 5.7 05/03/2013          64  -year-old  woman seen office today in f/u for following medical problems ,      1.  Hypertension - blood pressure is slightly elevated, target blood pressure less than 130/80,  Toprol- mg daily,   Will increase Amiloride from 5 mg daily to 10 mg daily, check potassium in a week,     2.  Primary hyperaldosteronism. CAT scan of the abdomen shows normal adrenal glands.  Continue amiloride and potassium supplements,      on potassium chloride 20 mEq twice a day.    patient did not want to proceed with adrenal vein sampling,     3. CKD stage 2/3 : Stable renal function with a serum creatinine of 1.1 mg/dL, advised patient to avoid NSAID use.     4.  Recent urinalysis shows normal range proteinuria.     5.  Hypokalemia - continue potassium supplements and triamterene, discussed potassium rich diet,    6.  Metabolic alkalosis discussed adequate fluid intake, advised patient to get evaluated for RASHAUN ,       We'll see her in followup in about 5 months.   more than 25 minutes of face-to-face time was spent with the patient discussing labs and plan of care.      Cliff Barrera M.D.

## 2020-02-10 ENCOUNTER — LAB VISIT (OUTPATIENT)
Dept: LAB | Facility: HOSPITAL | Age: 65
End: 2020-02-10
Attending: INTERNAL MEDICINE
Payer: MEDICARE

## 2020-02-10 ENCOUNTER — PATIENT MESSAGE (OUTPATIENT)
Dept: NEPHROLOGY | Facility: HOSPITAL | Age: 65
End: 2020-02-10

## 2020-02-10 DIAGNOSIS — I10 HTN (HYPERTENSION), BENIGN: ICD-10-CM

## 2020-02-10 LAB
ALBUMIN SERPL BCP-MCNC: 3.9 G/DL (ref 3.5–5.2)
ANION GAP SERPL CALC-SCNC: 7 MMOL/L (ref 8–16)
BUN SERPL-MCNC: 16 MG/DL (ref 8–23)
CALCIUM SERPL-MCNC: 9.7 MG/DL (ref 8.7–10.5)
CHLORIDE SERPL-SCNC: 107 MMOL/L (ref 95–110)
CO2 SERPL-SCNC: 29 MMOL/L (ref 23–29)
CREAT SERPL-MCNC: 1 MG/DL (ref 0.5–1.4)
EST. GFR  (AFRICAN AMERICAN): >60 ML/MIN/1.73 M^2
EST. GFR  (NON AFRICAN AMERICAN): 59 ML/MIN/1.73 M^2
GLUCOSE SERPL-MCNC: 87 MG/DL (ref 70–110)
PHOSPHATE SERPL-MCNC: 3.1 MG/DL (ref 2.7–4.5)
POTASSIUM SERPL-SCNC: 4.3 MMOL/L (ref 3.5–5.1)
SODIUM SERPL-SCNC: 143 MMOL/L (ref 136–145)

## 2020-02-10 PROCEDURE — 36415 COLL VENOUS BLD VENIPUNCTURE: CPT

## 2020-02-10 PROCEDURE — 80069 RENAL FUNCTION PANEL: CPT

## 2020-02-10 NOTE — TELEPHONE ENCOUNTER
Lab Results   Component Value Date    CREATININE 1.0 02/10/2020    BUN 16 02/10/2020     02/10/2020    K 4.3 02/10/2020     02/10/2020    CO2 29 02/10/2020       Stable labs    Dr Barrera

## 2020-03-18 RX ORDER — ZOLPIDEM TARTRATE 5 MG/1
5 TABLET ORAL NIGHTLY PRN
Qty: 30 TABLET | Refills: 5 | Status: SHIPPED | OUTPATIENT
Start: 2020-03-18 | End: 2020-11-03

## 2020-04-21 DIAGNOSIS — E87.6 HYPOKALEMIA: ICD-10-CM

## 2020-04-21 RX ORDER — POTASSIUM CHLORIDE 20 MEQ/1
20 TABLET, EXTENDED RELEASE ORAL 2 TIMES DAILY
Qty: 60 TABLET | Refills: 11 | Status: SHIPPED | OUTPATIENT
Start: 2020-04-21 | End: 2020-05-05

## 2020-04-23 RX ORDER — ROSUVASTATIN CALCIUM 5 MG/1
TABLET, COATED ORAL
Qty: 30 TABLET | Refills: 5 | Status: SHIPPED | OUTPATIENT
Start: 2020-04-23 | End: 2020-04-27

## 2020-04-27 RX ORDER — ROSUVASTATIN CALCIUM 5 MG/1
TABLET, COATED ORAL
Qty: 30 TABLET | Refills: 11 | Status: SHIPPED | OUTPATIENT
Start: 2020-04-27 | End: 2020-11-22

## 2020-05-01 DIAGNOSIS — E87.6 HYPOKALEMIA: ICD-10-CM

## 2020-05-05 DIAGNOSIS — Z00.00 ROUTINE GENERAL MEDICAL EXAMINATION AT A HEALTH CARE FACILITY: Primary | ICD-10-CM

## 2020-05-05 RX ORDER — POTASSIUM CHLORIDE 20 MEQ/1
TABLET, EXTENDED RELEASE ORAL
Qty: 60 TABLET | Refills: 11 | Status: SHIPPED | OUTPATIENT
Start: 2020-05-05 | End: 2021-05-19

## 2020-06-01 ENCOUNTER — PATIENT OUTREACH (OUTPATIENT)
Dept: ADMINISTRATIVE | Facility: HOSPITAL | Age: 65
End: 2020-06-01

## 2020-06-01 NOTE — PROGRESS NOTES
Pre Visit Chart Audit complete.    Health Maintenance: Updated  Immunizations: Abstracted  Care Everywhere: No results found  [facility] Search: No results  Health Maintenance Due   Topic    Aspirin/Antiplatelet Therapy     TETANUS VACCINE     Pneumococcal Vaccine (65+ High/Highest Risk) (1 of 2 - PCV13)    Lipid Panel

## 2020-06-01 NOTE — PATIENT INSTRUCTIONS
Pre-Visit chart audit complete.     Health Maintenance: Updated  Immunizations: Abstracted  Care Everywhere: No results found  [facility] Search: Pt not found.   Health Maintenance Due   Topic    Aspirin/Antiplatelet Therapy     TETANUS VACCINE     Pneumococcal Vaccine (65+ High/Highest Risk) (1 of 2 - PCV13)    Lipid Panel

## 2020-06-15 ENCOUNTER — OFFICE VISIT (OUTPATIENT)
Dept: INTERNAL MEDICINE | Facility: CLINIC | Age: 65
End: 2020-06-15
Payer: MEDICARE

## 2020-06-15 ENCOUNTER — HOSPITAL ENCOUNTER (OUTPATIENT)
Dept: RADIOLOGY | Facility: HOSPITAL | Age: 65
Discharge: HOME | End: 2020-06-15
Attending: FAMILY MEDICINE
Payer: MEDICARE

## 2020-06-15 ENCOUNTER — CLINICAL SUPPORT (OUTPATIENT)
Dept: INTERNAL MEDICINE | Facility: CLINIC | Age: 65
End: 2020-06-15
Payer: MEDICARE

## 2020-06-15 ENCOUNTER — HOSPITAL ENCOUNTER (OUTPATIENT)
Dept: CARDIOLOGY | Facility: HOSPITAL | Age: 65
Discharge: HOME | End: 2020-06-15
Payer: MEDICARE

## 2020-06-15 VITALS
HEIGHT: 63 IN | SYSTOLIC BLOOD PRESSURE: 131 MMHG | DIASTOLIC BLOOD PRESSURE: 82 MMHG | WEIGHT: 149.94 LBS | HEART RATE: 60 BPM | BODY MASS INDEX: 26.57 KG/M2

## 2020-06-15 DIAGNOSIS — Z00.00 ROUTINE GENERAL MEDICAL EXAMINATION AT A HEALTH CARE FACILITY: ICD-10-CM

## 2020-06-15 DIAGNOSIS — R79.9 ABNORMAL BLOOD CHEMISTRY: ICD-10-CM

## 2020-06-15 DIAGNOSIS — Z00.00 ROUTINE HEALTH MAINTENANCE: ICD-10-CM

## 2020-06-15 DIAGNOSIS — R06.09 DOE (DYSPNEA ON EXERTION): ICD-10-CM

## 2020-06-15 DIAGNOSIS — R06.09 DOE (DYSPNEA ON EXERTION): Primary | ICD-10-CM

## 2020-06-15 DIAGNOSIS — E78.5 HYPERLIPIDEMIA, UNSPECIFIED HYPERLIPIDEMIA TYPE: ICD-10-CM

## 2020-06-15 DIAGNOSIS — R82.90 NONSPECIFIC FINDING ON EXAMINATION OF URINE: ICD-10-CM

## 2020-06-15 DIAGNOSIS — R53.83 FATIGUE, UNSPECIFIED TYPE: Primary | ICD-10-CM

## 2020-06-15 LAB
ALBUMIN SERPL BCP-MCNC: 4 G/DL (ref 3.5–5.2)
ALP SERPL-CCNC: 77 U/L (ref 55–135)
ALT SERPL W/O P-5'-P-CCNC: 27 U/L (ref 10–44)
ANION GAP SERPL CALC-SCNC: 9 MMOL/L (ref 8–16)
AST SERPL-CCNC: 28 U/L (ref 10–40)
BILIRUB SERPL-MCNC: 0.6 MG/DL (ref 0.1–1)
BILIRUB UR QL STRIP: NEGATIVE
BUN SERPL-MCNC: 19 MG/DL (ref 8–23)
CALCIUM SERPL-MCNC: 9.4 MG/DL (ref 8.7–10.5)
CHLORIDE SERPL-SCNC: 106 MMOL/L (ref 95–110)
CHOLEST SERPL-MCNC: 166 MG/DL (ref 120–199)
CHOLEST/HDLC SERPL: 2.4 {RATIO} (ref 2–5)
CLARITY UR: CLEAR
CO2 SERPL-SCNC: 27 MMOL/L (ref 23–29)
COLOR UR: YELLOW
CREAT SERPL-MCNC: 1 MG/DL (ref 0.5–1.4)
ERYTHROCYTE [DISTWIDTH] IN BLOOD BY AUTOMATED COUNT: 13.5 % (ref 11.5–14.5)
EST. GFR  (AFRICAN AMERICAN): >60 ML/MIN/1.73 M^2
EST. GFR  (NON AFRICAN AMERICAN): 59 ML/MIN/1.73 M^2
GLUCOSE SERPL-MCNC: 101 MG/DL (ref 70–110)
GLUCOSE UR QL STRIP: NEGATIVE
HCT VFR BLD AUTO: 39.4 % (ref 37–48.5)
HDLC SERPL-MCNC: 70 MG/DL (ref 40–75)
HDLC SERPL: 42.2 % (ref 20–50)
HGB BLD-MCNC: 12.5 G/DL (ref 12–16)
HGB UR QL STRIP: NEGATIVE
KETONES UR QL STRIP: NEGATIVE
LDLC SERPL CALC-MCNC: 79.8 MG/DL (ref 63–159)
LEUKOCYTE ESTERASE UR QL STRIP: NEGATIVE
MCH RBC QN AUTO: 30.2 PG (ref 27–31)
MCHC RBC AUTO-ENTMCNC: 31.7 G/DL (ref 32–36)
MCV RBC AUTO: 95 FL (ref 82–98)
NITRITE UR QL STRIP: NEGATIVE
NONHDLC SERPL-MCNC: 96 MG/DL
PH UR STRIP: 8 [PH] (ref 5–8)
PLATELET # BLD AUTO: 252 K/UL (ref 150–350)
PMV BLD AUTO: 10.1 FL (ref 9.2–12.9)
POTASSIUM SERPL-SCNC: 3.8 MMOL/L (ref 3.5–5.1)
PROT SERPL-MCNC: 7.2 G/DL (ref 6–8.4)
PROT UR QL STRIP: NEGATIVE
RBC # BLD AUTO: 4.14 M/UL (ref 4–5.4)
SODIUM SERPL-SCNC: 142 MMOL/L (ref 136–145)
SP GR UR STRIP: 1.02 (ref 1–1.03)
TRIGL SERPL-MCNC: 81 MG/DL (ref 30–150)
URN SPEC COLLECT METH UR: NORMAL
WBC # BLD AUTO: 6.01 K/UL (ref 3.9–12.7)

## 2020-06-15 PROCEDURE — 71046 X-RAY EXAM CHEST 2 VIEWS: CPT | Mod: 26,,, | Performed by: RADIOLOGY

## 2020-06-15 PROCEDURE — 99397 PR PREVENTIVE VISIT,EST,65 & OVER: ICD-10-PCS | Mod: S$PBB,,, | Performed by: FAMILY MEDICINE

## 2020-06-15 PROCEDURE — 80053 COMPREHEN METABOLIC PANEL: CPT

## 2020-06-15 PROCEDURE — 80061 LIPID PANEL: CPT

## 2020-06-15 PROCEDURE — 85027 COMPLETE CBC AUTOMATED: CPT

## 2020-06-15 PROCEDURE — 99999 PR PBB SHADOW E&M-EST. PATIENT-LVL IV: CPT | Mod: PBBFAC,,, | Performed by: FAMILY MEDICINE

## 2020-06-15 PROCEDURE — 93005 ELECTROCARDIOGRAM TRACING: CPT

## 2020-06-15 PROCEDURE — 71046 XR CHEST PA AND LATERAL: ICD-10-PCS | Mod: 26,,, | Performed by: RADIOLOGY

## 2020-06-15 PROCEDURE — 99214 OFFICE O/P EST MOD 30 MIN: CPT | Mod: PBBFAC,25 | Performed by: FAMILY MEDICINE

## 2020-06-15 PROCEDURE — 93010 EKG 12-LEAD: ICD-10-PCS | Mod: ,,, | Performed by: INTERNAL MEDICINE

## 2020-06-15 PROCEDURE — 99999 PR PBB SHADOW E&M-EST. PATIENT-LVL IV: ICD-10-PCS | Mod: PBBFAC,,, | Performed by: FAMILY MEDICINE

## 2020-06-15 PROCEDURE — 71046 X-RAY EXAM CHEST 2 VIEWS: CPT | Mod: TC

## 2020-06-15 PROCEDURE — 99397 PER PM REEVAL EST PAT 65+ YR: CPT | Mod: S$PBB,,, | Performed by: FAMILY MEDICINE

## 2020-06-15 PROCEDURE — G0009 ADMIN PNEUMOCOCCAL VACCINE: HCPCS | Mod: PBBFAC

## 2020-06-15 PROCEDURE — 93010 ELECTROCARDIOGRAM REPORT: CPT | Mod: ,,, | Performed by: INTERNAL MEDICINE

## 2020-06-15 PROCEDURE — 81003 URINALYSIS AUTO W/O SCOPE: CPT

## 2020-06-15 NOTE — PROGRESS NOTES
Subjective:       Patient ID: Kacy Blackwell is a . female.    Chief Complaint: exec phys    HPIhere for exec phys;   Hypertensionhyperalod saw specialist;saw castro haile and bx deferred: blood pressures at home normal. Tolerating medicine.   osteopor shad fosamax    DAVIDSON few months .was more active precovid but nto as much now. She does req cardiol.. famhx cad. No chest pain    Insomnia hx prn ambien:#21 lasts 5 months not due  hyperchol shad med    Chronic cough x yrs. Saw dr chambers note reviewed;saw pulm some yrs ago.     Taking care of 97 y/o mom as June 20    6 months l lower lid quivering intermitt thru day. Sleep is ok. 5 cups /day    Past Medical History:   Diagnosis Date    Chronic cough     controlled w flonase/zyrtec    Hyperaldosteronism     dr cummings    Hyperlipidemia     Hypertension     Hypokalemia     Osteoporosis 12/16 naeem 12/18    Seasonal allergies      Past Surgical History:   Procedure Laterality Date    cyst removed from head      HYSTERECTOMY  1980    cervix absent     Family History   Problem Relation Age of Onset    Hypertension Mother     Arthritis Mother     Diabetes Mother     Heart failure Mother     Hypertension Father     Arthritis Father     Cancer Sister     Hypertension Brother     Diabetes Brother     Stroke Brother     Hypertension Sister     Breast cancer Neg Hx     Colon cancer Neg Hx     Ovarian cancer Neg Hx      Social History     Social History    Marital status:      Spouse name: N/A    Number of children: N/A    Years of education: N/A     Social History Main Topics    Smoking status: Never Smoker    Smokeless tobacco: Never Used    Alcohol use Yes      Comment: rare social drinker    Drug use: No    Sexual activity: Yes     Partners: Male     Other Topics Concern    None     Social History Narrative    . 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 She appears well-developed and well-nourished. No distress.   HENT:   Head: Atraumatic.   Right Ear: External ear normal.   Left Ear: External ear normal.   Nose: Nose normal.   Mouth/Throat: Oropharynx is clear and moist. No oropharyngeal exudate.   bilat ci  Eyes: Conjunctivae and EOM are normal. Pupils are equal, round, and reactive to light. No scleral icterus.   Neck: Normal range of motion. Neck supple. No thyromegaly present.   Cardiovascular: Normal rate, regular rhythm and normal heart sounds.    No murmur heard.  Pulmonary/Chest: Effort normal and breath sounds normal. No respiratory distress. She has no wheezes. She has no rales.   Abdominal: Soft. Bowel sounds are normal. She exhibits no distension and no mass. There is no hepatosplenomegaly. There is no tenderness. There is no rebound and no guarding.   Musculoskeletal: Normal range of motion. She exhibits no edema or tenderness.   Lymphadenopathy:     She has no cervical adenopathy.   Neurological: She is alert and oriented to person, place, and time. No cranial nerve deficit. She exhibits normal muscle tone. Coordination normal.   Skin: Skin is warm. No rash noted. No erythema. No pallor.   Psychiatric: She has a normal mood and affect. Her behavior is normal. Judgment and thought content normal.   Nursing note and vitals reviewed.      Assessment:       1. Routine health maintenance    2. HTN (hypertension), benign /hyperaldost     Chronic cough  Nuñez  Lid quivering  ci  Plan:     f/u dr dick. When due  Will try home irrig. If no success then ent    Dec coffee intake if lid issue cont then notify me    Shingrix new shingles vaccine  via a pharmacy  Tetanus/whooping cough vaccine via pharmacy    Suspect nuñez will resolve w resmption exerc. Will see  after cv eval  DAVIDSON (dyspnea on exertion)  -     X-Ray Chest PA And Lateral; Future; Expected date: 06/15/2020  -     Stress Echo Which stress agent will be used? Treadmill Exercise; Color Flow Doppler? No; Future  -     Ambulatory referral/consult to Cardiology; Future; Expected date: 06/22/2020    Routine health maintenance    Other orders  -     (In Office Administered) Pneumococcal Conjugate Vaccine (13 Valent) (IM)

## 2020-07-02 ENCOUNTER — PATIENT OUTREACH (OUTPATIENT)
Dept: ADMINISTRATIVE | Facility: OTHER | Age: 65
End: 2020-07-02

## 2020-07-02 NOTE — PROGRESS NOTES
Requested updates within Care Everywhere.  Patient's chart was reviewed for overdue BIRDIE topics.  Immunizations reconciled.

## 2020-07-06 ENCOUNTER — LAB VISIT (OUTPATIENT)
Dept: LAB | Facility: HOSPITAL | Age: 65
End: 2020-07-06
Attending: INTERNAL MEDICINE
Payer: MEDICARE

## 2020-07-06 ENCOUNTER — OFFICE VISIT (OUTPATIENT)
Dept: CARDIOLOGY | Facility: CLINIC | Age: 65
End: 2020-07-06
Payer: MEDICARE

## 2020-07-06 VITALS
WEIGHT: 151.25 LBS | DIASTOLIC BLOOD PRESSURE: 86 MMHG | OXYGEN SATURATION: 99 % | BODY MASS INDEX: 26.8 KG/M2 | HEART RATE: 68 BPM | SYSTOLIC BLOOD PRESSURE: 140 MMHG | HEIGHT: 63 IN

## 2020-07-06 DIAGNOSIS — N18.30 CHRONIC KIDNEY DISEASE (CKD), STAGE III (MODERATE): ICD-10-CM

## 2020-07-06 DIAGNOSIS — E78.5 HYPERLIPIDEMIA, UNSPECIFIED HYPERLIPIDEMIA TYPE: ICD-10-CM

## 2020-07-06 DIAGNOSIS — I10 HTN (HYPERTENSION), BENIGN: Primary | ICD-10-CM

## 2020-07-06 DIAGNOSIS — R00.2 PALPITATIONS: ICD-10-CM

## 2020-07-06 DIAGNOSIS — R06.09 DOE (DYSPNEA ON EXERTION): ICD-10-CM

## 2020-07-06 DIAGNOSIS — G47.39 OTHER SLEEP APNEA: ICD-10-CM

## 2020-07-06 LAB
BASOPHILS # BLD AUTO: 0.01 K/UL (ref 0–0.2)
BASOPHILS NFR BLD: 0.1 % (ref 0–1.9)
DIFFERENTIAL METHOD: ABNORMAL
EOSINOPHIL # BLD AUTO: 0.1 K/UL (ref 0–0.5)
EOSINOPHIL NFR BLD: 1.7 % (ref 0–8)
ERYTHROCYTE [DISTWIDTH] IN BLOOD BY AUTOMATED COUNT: 13.9 % (ref 11.5–14.5)
HCT VFR BLD AUTO: 40.9 % (ref 37–48.5)
HGB BLD-MCNC: 12.5 G/DL (ref 12–16)
IMM GRANULOCYTES # BLD AUTO: 0.02 K/UL (ref 0–0.04)
IMM GRANULOCYTES NFR BLD AUTO: 0.3 % (ref 0–0.5)
LYMPHOCYTES # BLD AUTO: 2.6 K/UL (ref 1–4.8)
LYMPHOCYTES NFR BLD: 34 % (ref 18–48)
MCH RBC QN AUTO: 29.4 PG (ref 27–31)
MCHC RBC AUTO-ENTMCNC: 30.6 G/DL (ref 32–36)
MCV RBC AUTO: 96 FL (ref 82–98)
MONOCYTES # BLD AUTO: 0.6 K/UL (ref 0.3–1)
MONOCYTES NFR BLD: 8.3 % (ref 4–15)
NEUTROPHILS # BLD AUTO: 4.2 K/UL (ref 1.8–7.7)
NEUTROPHILS NFR BLD: 55.6 % (ref 38–73)
NRBC BLD-RTO: 0 /100 WBC
PLATELET # BLD AUTO: 279 K/UL (ref 150–350)
PMV BLD AUTO: 11.3 FL (ref 9.2–12.9)
RBC # BLD AUTO: 4.25 M/UL (ref 4–5.4)
WBC # BLD AUTO: 7.5 K/UL (ref 3.9–12.7)

## 2020-07-06 PROCEDURE — 99215 OFFICE O/P EST HI 40 MIN: CPT | Mod: PBBFAC | Performed by: INTERNAL MEDICINE

## 2020-07-06 PROCEDURE — 99204 OFFICE O/P NEW MOD 45 MIN: CPT | Mod: S$PBB,,, | Performed by: INTERNAL MEDICINE

## 2020-07-06 PROCEDURE — 83970 ASSAY OF PARATHORMONE: CPT

## 2020-07-06 PROCEDURE — 36415 COLL VENOUS BLD VENIPUNCTURE: CPT

## 2020-07-06 PROCEDURE — 99999 PR PBB SHADOW E&M-EST. PATIENT-LVL V: ICD-10-PCS | Mod: PBBFAC,,, | Performed by: INTERNAL MEDICINE

## 2020-07-06 PROCEDURE — 80069 RENAL FUNCTION PANEL: CPT

## 2020-07-06 PROCEDURE — 99204 PR OFFICE/OUTPT VISIT, NEW, LEVL IV, 45-59 MIN: ICD-10-PCS | Mod: S$PBB,,, | Performed by: INTERNAL MEDICINE

## 2020-07-06 PROCEDURE — 99999 PR PBB SHADOW E&M-EST. PATIENT-LVL V: CPT | Mod: PBBFAC,,, | Performed by: INTERNAL MEDICINE

## 2020-07-06 PROCEDURE — 85025 COMPLETE CBC W/AUTO DIFF WBC: CPT

## 2020-07-06 NOTE — PATIENT INSTRUCTIONS

## 2020-07-06 NOTE — PROGRESS NOTES
Subjective:   Patient ID:  Kacy Blackwell is a 64 y.o. female who presents for cardiac consult of Shortness of Breath    Referring Physician: Gurinder Pierce MD   Reason for consult: DAVIDSON    HPI  The patient came in today for cardiac consult of Shortness of Breath    7/6/20  Kacy Blackwell is a 64 y.o. female pt with HTN, HLD, chronic cough, allergies presents for CV eval for DAVIDSON.     She has been having DAVIDSON, sx for about a year. She has palpitations more than normal lately. She saw Dr. Pierce recently and had stress ECHO ordered.  She feels like heart stops/skips a beat at times. She saw Dr. Lu in the past. Stress ECHO next week. She had prior Holter neg, ECHO in past neg as well.   Palpitations not daily.  Is overall active - cuts grass, weed eats. Drinks 5 cups of coffee.     Patient feels  no leg swelling, no PND, no palpitation, no dizziness, no syncope, no CNS symptoms.    Patient has fairly good exercise tolerance.    Patient is compliant with medications.    FH - mother - 98 years ago, has CHF, aunts had CHF    Past Medical History:   Diagnosis Date    Chronic cough     controlled w flonase/zyrtec    Hyperaldosteronism     dr cummings/marcello    Hyperlipidemia     Hypertension     Hypokalemia     Osteopenia     Seasonal allergies        Past Surgical History:   Procedure Laterality Date    cyst removed from head      HYSTERECTOMY  1980    cervix absent       Social History     Tobacco Use    Smoking status: Never Smoker    Smokeless tobacco: Never Used   Substance Use Topics    Alcohol use: Yes     Comment: rare social drinker    Drug use: No       Family History   Problem Relation Age of Onset    Hypertension Mother     Arthritis Mother     Diabetes Mother     Heart failure Mother     Hypertension Father     Arthritis Father     Cancer Sister     Hypertension Brother     Diabetes Brother     Stroke Brother     Hypertension Sister     Breast cancer Neg Hx     Colon cancer Neg Hx      Ovarian cancer Neg Hx        Patient's Medications   New Prescriptions    No medications on file   Previous Medications    AMILORIDE (MIDAMOR) 5 MG TAB    Take 2 tablets (10 mg total) by mouth once daily.    BIOTIN ORAL    Take 10,000 mg by mouth once daily.     CALCIUM CARBONATE-VIT D3-MIN (CALTRATE 600+D PLUS MINERALS) 600 MG CALCIUM- 400 UNIT TAB    Take 1 tablet by mouth once daily.    CETIRIZINE (ZYRTEC) 10 MG TABLET    Take 10 mg by mouth once daily.    DEXTROMETHORPHAN-GUAIFENESIN  MG (MUCINEX DM)  MG PER 12 HR TABLET    Take 1 tablet by mouth once daily.    FLUTICASONE PROPIONATE (FLONASE) 50 MCG/ACTUATION NASAL SPRAY    2 sprays (100 mcg total) by Each Nare route once daily.    METOPROLOL SUCCINATE (TOPROL-XL) 100 MG 24 HR TABLET    Take 1 tablet (100 mg total) by mouth once daily.    MULTIVITAMIN CAPSULE    Take 1 capsule by mouth once daily.    -PROPYLENE GLYCOL, PF, (SYSTANE, PF,) 0.4-0.3 % DPET    Apply to eye.    POTASSIUM CHLORIDE SA (K-DUR,KLOR-CON) 20 MEQ TABLET    TAKE ONE TABLET BY MOUTH TWICE DAILY    ROSUVASTATIN (CRESTOR) 5 MG TABLET    TAKE 1 TABLET BY MOUTH NIGHTLY.    ZOLPIDEM (AMBIEN) 5 MG TAB    Take 1 tablet (5 mg total) by mouth nightly as needed.   Modified Medications    No medications on file   Discontinued Medications    No medications on file       Review of Systems   Constitutional: Negative.    HENT: Negative.    Eyes: Negative.    Respiratory: Positive for shortness of breath.    Cardiovascular: Positive for palpitations.   Gastrointestinal: Negative.    Genitourinary: Negative.    Musculoskeletal: Negative.    Skin: Negative.    Neurological: Negative.    Endo/Heme/Allergies: Negative.    Psychiatric/Behavioral: Negative.    All 12 systems otherwise negative.      Wt Readings from Last 3 Encounters:   07/06/20 68.6 kg (151 lb 3.8 oz)   06/15/20 68 kg (149 lb 14.6 oz)   01/31/20 68.4 kg (150 lb 12.7 oz)     Temp Readings from Last 3 Encounters:   09/03/19  "97.6 °F (36.4 °C)   05/06/19 96.3 °F (35.7 °C) (Tympanic)   04/01/19 97.7 °F (36.5 °C)     BP Readings from Last 3 Encounters:   07/06/20 (!) 140/86   06/15/20 131/82   01/31/20 (!) 128/90     Pulse Readings from Last 3 Encounters:   07/06/20 68   06/15/20 60   01/31/20 60       BP (!) 140/86 (BP Location: Right arm, Patient Position: Sitting, BP Method: Medium (Manual))   Pulse 68   Ht 5' 3" (1.6 m)   Wt 68.6 kg (151 lb 3.8 oz)   SpO2 99%   BMI 26.79 kg/m²     Objective:   Physical Exam   Constitutional: She is oriented to person, place, and time. She appears well-developed and well-nourished. No distress.   HENT:   Head: Normocephalic and atraumatic.   Nose: Nose normal.   Mouth/Throat: Oropharynx is clear and moist.   Eyes: Conjunctivae and EOM are normal. No scleral icterus.   Neck: Normal range of motion. Neck supple. No JVD present. No thyromegaly present.   Cardiovascular: Normal rate, regular rhythm, S1 normal and S2 normal. Exam reveals no gallop, no S3, no S4 and no friction rub.   No murmur heard.  Pulmonary/Chest: Effort normal and breath sounds normal. No stridor. No respiratory distress. She has no wheezes. She has no rales. She exhibits no tenderness.   Abdominal: Soft. Bowel sounds are normal. She exhibits no distension and no mass. There is no abdominal tenderness. There is no rebound.   Genitourinary:    Genitourinary Comments: Deferred     Musculoskeletal: Normal range of motion.         General: No tenderness, deformity or edema.   Lymphadenopathy:     She has no cervical adenopathy.   Neurological: She is alert and oriented to person, place, and time. She exhibits normal muscle tone. Coordination normal.   Skin: Skin is warm and dry. No rash noted. She is not diaphoretic. No erythema. No pallor.   Psychiatric: She has a normal mood and affect. Her behavior is normal. Judgment and thought content normal.   Nursing note and vitals reviewed.      Lab Results   Component Value Date     " 06/15/2020    K 3.8 06/15/2020     06/15/2020    CO2 27 06/15/2020    BUN 19 06/15/2020    CREATININE 1.0 06/15/2020     06/15/2020    HGBA1C 5.7 05/03/2013    MG 2.0 07/05/2013    AST 28 06/15/2020    ALT 27 06/15/2020    ALBUMIN 4.0 06/15/2020    PROT 7.2 06/15/2020    BILITOT 0.6 06/15/2020    WBC 6.01 06/15/2020    HGB 12.5 06/15/2020    HCT 39.4 06/15/2020    MCV 95 06/15/2020     06/15/2020    TSH 0.908 06/30/2015    CHOL 166 06/15/2020    HDL 70 06/15/2020    LDLCALC 79.8 06/15/2020    TRIG 81 06/15/2020     Assessment:      1. HTN (hypertension), benign    2. DAVIDSON (dyspnea on exertion)    3. Hyperlipidemia, unspecified hyperlipidemia type    4. Palpitations    5. Other sleep apnea        Plan:   1. Palpitations  - Zio patch ordered   - dec caffeine intake  - cont BB    2. HTN  - low salt diet - needs to improve diet and dec caffeine   - cont current meds  - will adjust meds if BP remains elevated  - has more stress with taking care of her mother    3. DAVIDSON  - stress ECHO ordered  - R/O RASHAUN    Thank you for allowing me to participate in this patient's care. Please do not hesitate to contact me with any questions or concerns. Consult note has been forwarded to the referral physician.

## 2020-07-06 NOTE — LETTER
July 6, 2020      Gurinder Pierce MD  [address]  [place] [address]           [facility]  [address]  [place] [address]  Phone: [phone]  Fax: [phone]          Patient: Kacy Blackwell   MR Number: [id number]   YOB: 1955   Date of Visit: 7/6/2020       Dear Dr. Gurinder Pierce:    Thank you for referring Kacy Blackwell to me for evaluation. Attached you will find relevant portions of my assessment and plan of care.    If you have questions, please do not hesitate to call me. I look forward to following Kacy Blackwell along with you.    Sincerely,    Rasheed Bruno MD    Enclosure  CC:  No Recipients    If you would like to receive this communication electronically, please contact [email or web address] or [phone] to request more information on [employer] Link access.    For providers and/or their staff who would like to refer a patient to [facility], please contact us through our one-stop-shop provider referral line, [facility] [place], at [phone].    If you feel you have received this communication in error or would no longer like to receive these types of communications, please e-mail [email or web address]

## 2020-07-07 LAB
ALBUMIN SERPL BCP-MCNC: 4.1 G/DL (ref 3.5–5.2)
ANION GAP SERPL CALC-SCNC: 6 MMOL/L (ref 8–16)
BUN SERPL-MCNC: 19 MG/DL (ref 8–23)
CALCIUM SERPL-MCNC: 10.3 MG/DL (ref 8.7–10.5)
CHLORIDE SERPL-SCNC: 107 MMOL/L (ref 95–110)
CO2 SERPL-SCNC: 31 MMOL/L (ref 23–29)
CREAT SERPL-MCNC: 1.1 MG/DL (ref 0.5–1.4)
EST. GFR  (AFRICAN AMERICAN): >60 ML/MIN/1.73 M^2
EST. GFR  (NON AFRICAN AMERICAN): 52.8 ML/MIN/1.73 M^2
GLUCOSE SERPL-MCNC: 84 MG/DL (ref 70–110)
PHOSPHATE SERPL-MCNC: 3.7 MG/DL (ref 2.7–4.5)
POTASSIUM SERPL-SCNC: 4.4 MMOL/L (ref 3.5–5.1)
SODIUM SERPL-SCNC: 144 MMOL/L (ref 136–145)

## 2020-07-08 LAB — PTH-INTACT SERPL-MCNC: 25 PG/ML (ref 9–77)

## 2020-07-14 ENCOUNTER — OFFICE VISIT (OUTPATIENT)
Dept: NEPHROLOGY | Facility: CLINIC | Age: 65
End: 2020-07-14
Payer: MEDICARE

## 2020-07-14 ENCOUNTER — PATIENT OUTREACH (OUTPATIENT)
Dept: ADMINISTRATIVE | Facility: OTHER | Age: 65
End: 2020-07-14

## 2020-07-14 VITALS
SYSTOLIC BLOOD PRESSURE: 138 MMHG | DIASTOLIC BLOOD PRESSURE: 82 MMHG | RESPIRATION RATE: 17 BRPM | HEART RATE: 64 BPM | BODY MASS INDEX: 26.99 KG/M2 | WEIGHT: 152.31 LBS | HEIGHT: 63 IN

## 2020-07-14 DIAGNOSIS — N18.2 CHRONIC KIDNEY DISEASE (CKD), STAGE II (MILD): Primary | ICD-10-CM

## 2020-07-14 DIAGNOSIS — I10 HTN (HYPERTENSION), BENIGN: ICD-10-CM

## 2020-07-14 PROCEDURE — 99214 PR OFFICE/OUTPT VISIT, EST, LEVL IV, 30-39 MIN: ICD-10-PCS | Mod: S$PBB,,, | Performed by: INTERNAL MEDICINE

## 2020-07-14 PROCEDURE — 99999 PR PBB SHADOW E&M-EST. PATIENT-LVL IV: ICD-10-PCS | Mod: PBBFAC,,, | Performed by: INTERNAL MEDICINE

## 2020-07-14 PROCEDURE — 99999 PR PBB SHADOW E&M-EST. PATIENT-LVL IV: CPT | Mod: PBBFAC,,, | Performed by: INTERNAL MEDICINE

## 2020-07-14 PROCEDURE — 99214 OFFICE O/P EST MOD 30 MIN: CPT | Mod: S$PBB,,, | Performed by: INTERNAL MEDICINE

## 2020-07-14 PROCEDURE — 99214 OFFICE O/P EST MOD 30 MIN: CPT | Mod: PBBFAC | Performed by: INTERNAL MEDICINE

## 2020-07-14 NOTE — PATIENT INSTRUCTIONS
Avoid NSAID pain medications such as advil, aleve, motrin, ibuprofen, naprosyn, meloxicam, diclofenac, mobic.     Low salt diet

## 2020-07-16 ENCOUNTER — HOSPITAL ENCOUNTER (OUTPATIENT)
Dept: CARDIOLOGY | Facility: HOSPITAL | Age: 65
Discharge: HOME | End: 2020-07-16
Attending: INTERNAL MEDICINE
Payer: MEDICARE

## 2020-07-16 ENCOUNTER — HOSPITAL ENCOUNTER (OUTPATIENT)
Dept: CARDIOLOGY | Facility: HOSPITAL | Age: 65
Discharge: HOME | End: 2020-07-16
Attending: FAMILY MEDICINE
Payer: MEDICARE

## 2020-07-16 VITALS
WEIGHT: 152 LBS | BODY MASS INDEX: 26.93 KG/M2 | SYSTOLIC BLOOD PRESSURE: 150 MMHG | DIASTOLIC BLOOD PRESSURE: 92 MMHG | HEIGHT: 63 IN

## 2020-07-16 DIAGNOSIS — R00.2 PALPITATIONS: ICD-10-CM

## 2020-07-16 DIAGNOSIS — R06.09 DOE (DYSPNEA ON EXERTION): ICD-10-CM

## 2020-07-16 LAB
AORTIC ROOT ANNULUS: 2.88 CM
AV INDEX (PROSTH): 0.66
AV MEAN GRADIENT: 3 MMHG
AV PEAK GRADIENT: 6 MMHG
AV VALVE AREA: 1.98 CM2
AV VELOCITY RATIO: 0.67
BSA FOR ECHO PROCEDURE: 1.75 M2
CV ECHO LV RWT: 0.51 CM
CV STRESS BASE HR: 68 BPM
DIASTOLIC BLOOD PRESSURE: 92 MMHG
DOP CALC AO PEAK VEL: 1.18 M/S
DOP CALC AO VTI: 27.43 CM
DOP CALC LVOT AREA: 3 CM2
DOP CALC LVOT DIAMETER: 1.96 CM
DOP CALC LVOT PEAK VEL: 0.79 M/S
DOP CALC LVOT STROKE VOLUME: 54.4 CM3
DOP CALCLVOT PEAK VEL VTI: 18.04 CM
E WAVE DECELERATION TIME: 237.25 MSEC
E/A RATIO: 0.95
E/E' RATIO: 8.35 M/S
ECHO LV POSTERIOR WALL: 1.24 CM (ref 0.6–1.1)
FRACTIONAL SHORTENING: 44 % (ref 28–44)
INTERVENTRICULAR SEPTUM: 0.82 CM (ref 0.6–1.1)
IVRT: 105.61 MSEC
LA MAJOR: 5.36 CM
LA MINOR: 5.16 CM
LA WIDTH: 3.19 CM
LEFT ATRIUM SIZE: 3.34 CM
LEFT ATRIUM VOLUME INDEX: 27.7 ML/M2
LEFT ATRIUM VOLUME: 47.62 CM3
LEFT INTERNAL DIMENSION IN SYSTOLE: 2.75 CM (ref 2.1–4)
LEFT VENTRICLE DIASTOLIC VOLUME INDEX: 66.01 ML/M2
LEFT VENTRICLE DIASTOLIC VOLUME: 113.59 ML
LEFT VENTRICLE MASS INDEX: 107 G/M2
LEFT VENTRICLE SYSTOLIC VOLUME INDEX: 16.4 ML/M2
LEFT VENTRICLE SYSTOLIC VOLUME: 28.17 ML
LEFT VENTRICULAR INTERNAL DIMENSION IN DIASTOLE: 4.91 CM (ref 3.5–6)
LEFT VENTRICULAR MASS: 183.84 G
LV LATERAL E/E' RATIO: 7.89 M/S
LV SEPTAL E/E' RATIO: 8.88 M/S
MV PEAK A VEL: 0.75 M/S
MV PEAK E VEL: 0.71 M/S
MV STENOSIS PRESSURE HALF TIME: 68.8 MS
MV VALVE AREA P 1/2 METHOD: 3.2 CM2
OHS CV CPX 1 MINUTE RECOVERY HEART RATE: 94 BPM
OHS CV CPX 85 PERCENT MAX PREDICTED HEART RATE MALE: 126
OHS CV CPX ESTIMATED METS: 7
OHS CV CPX MAX PREDICTED HEART RATE: 149
OHS CV CPX PATIENT IS FEMALE: 1
OHS CV CPX PATIENT IS MALE: 0
OHS CV CPX PEAK DIASTOLIC BLOOD PRESSURE: 89 MMHG
OHS CV CPX PEAK HEAR RATE: 139 BPM
OHS CV CPX PEAK RATE PRESSURE PRODUCT: ABNORMAL
OHS CV CPX PEAK SYSTOLIC BLOOD PRESSURE: 210 MMHG
OHS CV CPX PERCENT MAX PREDICTED HEART RATE ACHIEVED: 93
OHS CV CPX RATE PRESSURE PRODUCT PRESENTING: ABNORMAL
PISA TR MAX VEL: 2.19 M/S
PULM VEIN S/D RATIO: 1.77
PV PEAK D VEL: 0.31 M/S
PV PEAK S VEL: 0.55 M/S
RA MAJOR: 4.96 CM
RA PRESSURE: 3 MMHG
RA WIDTH: 3.22 CM
RIGHT VENTRICULAR END-DIASTOLIC DIMENSION: 2.28 CM
SINUS: 3.04 CM
STJ: 3.28 CM
STRESS ANGINA INDEX: 0
STRESS ECHO POST EXERCISE DUR MIN: 6 MINUTES
STRESS ECHO POST EXERCISE DUR SEC: 0 SECONDS
SYSTOLIC BLOOD PRESSURE: 150 MMHG
TDI LATERAL: 0.09 M/S
TDI SEPTAL: 0.08 M/S
TDI: 0.09 M/S
TR MAX PG: 19 MMHG
TRICUSPID ANNULAR PLANE SYSTOLIC EXCURSION: 2.05 CM
TV REST PULMONARY ARTERY PRESSURE: 22 MMHG

## 2020-07-16 PROCEDURE — 0298T CV CARDIAC MONITOR - 3-14 DAY ADULT (CUPID ONLY): CPT | Mod: ,,, | Performed by: INTERNAL MEDICINE

## 2020-07-16 PROCEDURE — 0296T CV CARDIAC MONITOR - 3-14 DAY ADULT (CUPID ONLY): ICD-10-PCS | Mod: ,,, | Performed by: INTERNAL MEDICINE

## 2020-07-16 PROCEDURE — 0298T CV CARDIAC MONITOR - 3-14 DAY ADULT (CUPID ONLY): ICD-10-PCS | Mod: ,,, | Performed by: INTERNAL MEDICINE

## 2020-07-16 PROCEDURE — 93351 STRESS TTE COMPLETE: CPT

## 2020-07-16 PROCEDURE — 0296T CV CARDIAC MONITOR - 3-14 DAY ADULT (CUPID ONLY): CPT | Mod: ,,, | Performed by: INTERNAL MEDICINE

## 2020-07-16 PROCEDURE — 93351 STRESS ECHO (CUPID ONLY): ICD-10-PCS | Mod: 26,,, | Performed by: INTERNAL MEDICINE

## 2020-07-16 PROCEDURE — 93351 STRESS TTE COMPLETE: CPT | Mod: 26,,, | Performed by: INTERNAL MEDICINE

## 2020-08-14 ENCOUNTER — OFFICE VISIT (OUTPATIENT)
Dept: CARDIOLOGY | Facility: CLINIC | Age: 65
End: 2020-08-14
Payer: MEDICARE

## 2020-08-14 ENCOUNTER — PATIENT OUTREACH (OUTPATIENT)
Dept: ADMINISTRATIVE | Facility: OTHER | Age: 65
End: 2020-08-14

## 2020-08-14 VITALS
BODY MASS INDEX: 26.88 KG/M2 | OXYGEN SATURATION: 95 % | WEIGHT: 151.69 LBS | HEIGHT: 63 IN | DIASTOLIC BLOOD PRESSURE: 94 MMHG | HEART RATE: 86 BPM | SYSTOLIC BLOOD PRESSURE: 140 MMHG

## 2020-08-14 DIAGNOSIS — E78.2 MODERATE MIXED HYPERLIPIDEMIA NOT REQUIRING STATIN THERAPY: ICD-10-CM

## 2020-08-14 DIAGNOSIS — I10 HTN (HYPERTENSION), BENIGN: Primary | ICD-10-CM

## 2020-08-14 DIAGNOSIS — R00.2 PALPITATIONS: ICD-10-CM

## 2020-08-14 DIAGNOSIS — R06.09 OTHER FORM OF DYSPNEA: ICD-10-CM

## 2020-08-14 PROCEDURE — 99999 PR PBB SHADOW E&M-EST. PATIENT-LVL III: ICD-10-PCS | Mod: PBBFAC,,, | Performed by: INTERNAL MEDICINE

## 2020-08-14 PROCEDURE — 99999 PR PBB SHADOW E&M-EST. PATIENT-LVL III: CPT | Mod: PBBFAC,,, | Performed by: INTERNAL MEDICINE

## 2020-08-14 PROCEDURE — 99213 OFFICE O/P EST LOW 20 MIN: CPT | Mod: PBBFAC | Performed by: INTERNAL MEDICINE

## 2020-08-14 PROCEDURE — 99214 PR OFFICE/OUTPT VISIT, EST, LEVL IV, 30-39 MIN: ICD-10-PCS | Mod: S$PBB,,, | Performed by: INTERNAL MEDICINE

## 2020-08-14 PROCEDURE — 99214 OFFICE O/P EST MOD 30 MIN: CPT | Mod: S$PBB,,, | Performed by: INTERNAL MEDICINE

## 2020-08-14 RX ORDER — AMLODIPINE BESYLATE 5 MG/1
5 TABLET ORAL DAILY
Qty: 30 TABLET | Refills: 3 | Status: SHIPPED | OUTPATIENT
Start: 2020-08-14 | End: 2020-12-10 | Stop reason: SDUPTHER

## 2020-08-14 NOTE — PROGRESS NOTES
Subjective:   Patient ID:  Kacy Blackwell is a 64 y.o. female who presents for cardiac consult of Hyperlipidemia and Hypertension    Referring Physician: Gurinedr Pierce MD   Reason for consult: DAVIDSON    Hyperlipidemia  Associated symptoms include shortness of breath.   Hypertension  Associated symptoms include palpitations and shortness of breath.     The patient came in today for cardiac consult of Hyperlipidemia and Hypertension      Kacy Blackwell is a 64 y.o. female pt with HTN, HLD, chronic cough, allergies presents for follow up CV eval.     7/6/20  She has been having DAVIDSON, sx for about a year. She has palpitations more than normal lately. She saw Dr. Pierce recently and had stress ECHO ordered.  She feels like heart stops/skips a beat at times. She saw Dr. Lu in the past. Stress ECHO next week. She had prior Holter neg, ECHO in past neg as well.   Palpitations not daily.  Is overall active - cuts grass, weed eats. Drinks 5 cups of coffee.     8/14/20  Zio with rare ectopy, 1 four beat VT and 3 short SVT runs, stress ECHO overall neg. She has cut back on coffee - now down to 2-3 cups/day less palpations. Has more stress now at home.      Patient feels  no leg swelling, no PND, no palpitation, no dizziness, no syncope, no CNS symptoms.    Patient has fairly good exercise tolerance.    Patient is compliant with medications.    FH - mother - 98 years ago, has CHF, aunts had CHF    7/16/20 Stress ECHO  · Concentric left ventricular hypertrophy.  · Normal left ventricular systolic function. The estimated ejection fraction is 55%.  · Normal LV diastolic function.  · No wall motion abnormalities.  · Normal right ventricular systolic function.  · Normal central venous pressure (3 mmHg).  · The estimated PA systolic pressure is 22 mmHg.  · The stress echo portion of this study is negative for myocardial ischemia.  · The test was stopped because the patient experienced fatigue.  · There were no arrhythmias during  stress.  · The ECG portion of this study is negative for myocardial ischemia.      ZIO  · Patient had a min HR of 43 bpm, max HR of 190 bpm, and avg HR of 73 bpm. Predominant underlying rhythm was Sinus Rhythm. 1 run of Ventricular Tachycardia occurred lasting 4 beats with a max rate of 190 bpm (avg 150 bpm). 3 Supraventricular Tachycardia runs occurred, the run with the fastest interval lasting 4 beats with a max rate of 176 bpm, the longest lasting 5 beats with an avg rate of 169 bpm. Isolated SVEs were rare (<1.0%), SVE Couplets were rare (<1.0%), and SVE Triplets were rare (<1.0%). Isolated VEs were rare (<1.0%), and no VE Couplets or VE Triplets were present.    Past Medical History:   Diagnosis Date    Chronic cough     controlled w flonase/zyrtec    Hyperaldosteronism     dr cummings/marcello    Hyperlipidemia     Hypertension     Hypokalemia     Osteopenia     Seasonal allergies        Past Surgical History:   Procedure Laterality Date    cyst removed from head      HYSTERECTOMY  1980    cervix absent       Social History     Tobacco Use    Smoking status: Never Smoker    Smokeless tobacco: Never Used   Substance Use Topics    Alcohol use: Yes     Comment: rare social drinker    Drug use: No       Family History   Problem Relation Age of Onset    Hypertension Mother     Arthritis Mother     Diabetes Mother     Heart failure Mother     Hypertension Father     Arthritis Father     Cancer Sister     Hypertension Brother     Diabetes Brother     Stroke Brother     Hypertension Sister     Breast cancer Neg Hx     Colon cancer Neg Hx     Ovarian cancer Neg Hx        Patient's Medications   New Prescriptions    AMLODIPINE (NORVASC) 5 MG TABLET    Take 1 tablet (5 mg total) by mouth once daily.   Previous Medications    AMILORIDE (MIDAMOR) 5 MG TAB    Take 2 tablets (10 mg total) by mouth once daily.    BIOTIN ORAL    Take 10,000 mg by mouth once daily.     CALCIUM CARBONATE-VIT D3-MIN  "(CALTRATE 600+D PLUS MINERALS) 600 MG CALCIUM- 400 UNIT TAB    Take 1 tablet by mouth once daily.    CETIRIZINE (ZYRTEC) 10 MG TABLET    Take 10 mg by mouth once daily.    DEXTROMETHORPHAN-GUAIFENESIN  MG (MUCINEX DM)  MG PER 12 HR TABLET    Take 1 tablet by mouth once daily.    METOPROLOL SUCCINATE (TOPROL-XL) 100 MG 24 HR TABLET    Take 1 tablet (100 mg total) by mouth once daily.    MULTIVITAMIN CAPSULE    Take 1 capsule by mouth once daily.    -PROPYLENE GLYCOL, PF, (SYSTANE, PF,) 0.4-0.3 % DPET    Apply to eye.    POTASSIUM CHLORIDE SA (K-DUR,KLOR-CON) 20 MEQ TABLET    TAKE ONE TABLET BY MOUTH TWICE DAILY    ROSUVASTATIN (CRESTOR) 5 MG TABLET    TAKE 1 TABLET BY MOUTH NIGHTLY.    ZOLPIDEM (AMBIEN) 5 MG TAB    Take 1 tablet (5 mg total) by mouth nightly as needed.   Modified Medications    No medications on file   Discontinued Medications    No medications on file       Review of Systems   Constitutional: Negative.    HENT: Negative.    Eyes: Negative.    Respiratory: Positive for shortness of breath.    Cardiovascular: Positive for palpitations.   Gastrointestinal: Negative.    Genitourinary: Negative.    Musculoskeletal: Negative.    Skin: Negative.    Neurological: Negative.    Endo/Heme/Allergies: Negative.    Psychiatric/Behavioral: Negative.    All 12 systems otherwise negative.      Wt Readings from Last 3 Encounters:   08/14/20 68.8 kg (151 lb 10.8 oz)   07/16/20 68.9 kg (152 lb)   07/14/20 69.1 kg (152 lb 5.4 oz)     Temp Readings from Last 3 Encounters:   09/03/19 97.6 °F (36.4 °C)   05/06/19 96.3 °F (35.7 °C) (Tympanic)   04/01/19 97.7 °F (36.5 °C)     BP Readings from Last 3 Encounters:   08/14/20 (!) 140/94   07/16/20 (!) 150/92   07/14/20 138/82     Pulse Readings from Last 3 Encounters:   08/14/20 86   07/14/20 64   07/06/20 68       BP (!) 140/94 (BP Location: Right arm, Patient Position: Sitting, BP Method: Medium (Manual))   Pulse 86   Ht 5' 3" (1.6 m)   Wt 68.8 kg (151 lb " 10.8 oz)   SpO2 95%   BMI 26.87 kg/m²     Objective:   Physical Exam   Constitutional: She is oriented to person, place, and time. She appears well-developed and well-nourished. No distress.   HENT:   Head: Normocephalic and atraumatic.   Nose: Nose normal.   Mouth/Throat: Oropharynx is clear and moist.   Eyes: Conjunctivae and EOM are normal. No scleral icterus.   Neck: Normal range of motion. Neck supple. No JVD present. No thyromegaly present.   Cardiovascular: Normal rate, regular rhythm, S1 normal and S2 normal. Exam reveals no gallop, no S3, no S4 and no friction rub.   No murmur heard.  Pulmonary/Chest: Effort normal and breath sounds normal. No stridor. No respiratory distress. She has no wheezes. She has no rales. She exhibits no tenderness.   Abdominal: Soft. Bowel sounds are normal. She exhibits no distension and no mass. There is no abdominal tenderness. There is no rebound.   Genitourinary:    Genitourinary Comments: Deferred     Musculoskeletal: Normal range of motion.         General: No tenderness, deformity or edema.   Lymphadenopathy:     She has no cervical adenopathy.   Neurological: She is alert and oriented to person, place, and time. She exhibits normal muscle tone. Coordination normal.   Skin: Skin is warm and dry. No rash noted. She is not diaphoretic. No erythema. No pallor.   Psychiatric: She has a normal mood and affect. Her behavior is normal. Judgment and thought content normal.   Nursing note and vitals reviewed.      Lab Results   Component Value Date     07/06/2020    K 4.4 07/06/2020     07/06/2020    CO2 31 (H) 07/06/2020    BUN 19 07/06/2020    CREATININE 1.1 07/06/2020    GLU 84 07/06/2020    HGBA1C 5.7 05/03/2013    MG 2.0 07/05/2013    AST 28 06/15/2020    ALT 27 06/15/2020    ALBUMIN 4.1 07/06/2020    PROT 7.2 06/15/2020    BILITOT 0.6 06/15/2020    WBC 7.50 07/06/2020    HGB 12.5 07/06/2020    HCT 40.9 07/06/2020    MCV 96 07/06/2020     07/06/2020    TSH  0.908 06/30/2015    CHOL 166 06/15/2020    HDL 70 06/15/2020    LDLCALC 79.8 06/15/2020    TRIG 81 06/15/2020     Assessment:      1. HTN (hypertension), benign    2. Palpitations    3. Moderate mixed hyperlipidemia not requiring statin therapy    4. Other form of dyspnea        Plan:   1. Palpitations  - Zio patch - rate ectopy  - dec caffeine intake  - cont BB    2. HTN  - low salt diet - needs to improve diet and dec caffeine   - cont current meds  - will adjust meds if BP remains elevated  - has more stress with taking care of her mother  - add Norvasc 5 mg    3. DAVIDSON  - stress ECHO - negative  - R/O RASHAUN    Thank you for allowing me to participate in this patient's care. Please do not hesitate to contact me with any questions or concerns. Consult note has been forwarded to the referral physician.

## 2020-09-24 ENCOUNTER — PATIENT OUTREACH (OUTPATIENT)
Dept: ADMINISTRATIVE | Facility: OTHER | Age: 65
End: 2020-09-24

## 2020-09-25 ENCOUNTER — OFFICE VISIT (OUTPATIENT)
Dept: CARDIOLOGY | Facility: CLINIC | Age: 65
End: 2020-09-25
Payer: MEDICARE

## 2020-09-25 VITALS
BODY MASS INDEX: 27.14 KG/M2 | OXYGEN SATURATION: 98 % | DIASTOLIC BLOOD PRESSURE: 83 MMHG | SYSTOLIC BLOOD PRESSURE: 126 MMHG | HEART RATE: 98 BPM | WEIGHT: 153.25 LBS

## 2020-09-25 DIAGNOSIS — E78.49 OTHER HYPERLIPIDEMIA: ICD-10-CM

## 2020-09-25 DIAGNOSIS — I10 HTN (HYPERTENSION), BENIGN: Primary | ICD-10-CM

## 2020-09-25 DIAGNOSIS — R00.2 PALPITATIONS: ICD-10-CM

## 2020-09-25 PROCEDURE — 99214 OFFICE O/P EST MOD 30 MIN: CPT | Mod: S$PBB,,, | Performed by: INTERNAL MEDICINE

## 2020-09-25 PROCEDURE — 99999 PR PBB SHADOW E&M-EST. PATIENT-LVL III: CPT | Mod: PBBFAC,,, | Performed by: INTERNAL MEDICINE

## 2020-09-25 PROCEDURE — 99214 PR OFFICE/OUTPT VISIT, EST, LEVL IV, 30-39 MIN: ICD-10-PCS | Mod: S$PBB,,, | Performed by: INTERNAL MEDICINE

## 2020-09-25 PROCEDURE — 99999 PR PBB SHADOW E&M-EST. PATIENT-LVL III: ICD-10-PCS | Mod: PBBFAC,,, | Performed by: INTERNAL MEDICINE

## 2020-09-25 PROCEDURE — 99213 OFFICE O/P EST LOW 20 MIN: CPT | Mod: PBBFAC | Performed by: INTERNAL MEDICINE

## 2020-09-25 NOTE — PROGRESS NOTES
Subjective:   Patient ID:  Kacy Blackwell is a 64 y.o. female who presents for cardiac consult of Follow-up    Referring Physician: Gurinder Pierce MD   Reason for initial consult: DAVIDSON    Hyperlipidemia  Pertinent negatives include no shortness of breath.   Hypertension  Pertinent negatives include no palpitations or shortness of breath.     The patient came in today for cardiac consult of Follow-up      Kacy Blackwell is a 64 y.o. female pt with HTN, HLD, chronic cough, allergies presents for follow up CV eval.     7/6/20  She has been having DAVIDSON, sx for about a year. She has palpitations more than normal lately. She saw Dr. Pierce recently and had stress ECHO ordered.  She feels like heart stops/skips a beat at times. She saw Dr. Lu in the past. Stress ECHO next week. She had prior Holter neg, ECHO in past neg as well.   Palpitations not daily.  Is overall active - cuts grass, weed eats. Drinks 5 cups of coffee.     8/14/20  Zio with rare ectopy, 1 four beat VT and 3 short SVT runs, stress ECHO overall neg. She has cut back on coffee - now down to 2-3 cups/day less palpations. Has more stress now at home.     9/25/20  Added Norvasc last visit. BP well controlled now. She feels well lately. BP log is well controlled low normal. No SOB.     Patient feels  no leg swelling, no PND, no palpitation, no dizziness, no syncope, no CNS symptoms.    Patient has fairly good exercise tolerance.    Patient is compliant with medications.    FH - mother - 98 years ago, has CHF, aunts had CHF    7/16/20 Stress ECHO  · Concentric left ventricular hypertrophy.  · Normal left ventricular systolic function. The estimated ejection fraction is 55%.  · Normal LV diastolic function.  · No wall motion abnormalities.  · Normal right ventricular systolic function.  · Normal central venous pressure (3 mmHg).  · The estimated PA systolic pressure is 22 mmHg.  · The stress echo portion of this study is negative for myocardial  ischemia.  · The test was stopped because the patient experienced fatigue.  · There were no arrhythmias during stress.  · The ECG portion of this study is negative for myocardial ischemia.      ZIO  · Patient had a min HR of 43 bpm, max HR of 190 bpm, and avg HR of 73 bpm. Predominant underlying rhythm was Sinus Rhythm. 1 run of Ventricular Tachycardia occurred lasting 4 beats with a max rate of 190 bpm (avg 150 bpm). 3 Supraventricular Tachycardia runs occurred, the run with the fastest interval lasting 4 beats with a max rate of 176 bpm, the longest lasting 5 beats with an avg rate of 169 bpm. Isolated SVEs were rare (<1.0%), SVE Couplets were rare (<1.0%), and SVE Triplets were rare (<1.0%). Isolated VEs were rare (<1.0%), and no VE Couplets or VE Triplets were present.    Past Medical History:   Diagnosis Date    Chronic cough     controlled w flonase/zyrtec    Hyperaldosteronism     dr cummings/marcello    Hyperlipidemia     Hypertension     Hypokalemia     Osteopenia     Seasonal allergies        Past Surgical History:   Procedure Laterality Date    cyst removed from head      HYSTERECTOMY  1980    cervix absent       Social History     Tobacco Use    Smoking status: Never Smoker    Smokeless tobacco: Never Used   Substance Use Topics    Alcohol use: Yes     Comment: rare social drinker    Drug use: No       Family History   Problem Relation Age of Onset    Hypertension Mother     Arthritis Mother     Diabetes Mother     Heart failure Mother     Hypertension Father     Arthritis Father     Cancer Sister     Hypertension Brother     Diabetes Brother     Stroke Brother     Hypertension Sister     Breast cancer Neg Hx     Colon cancer Neg Hx     Ovarian cancer Neg Hx        Patient's Medications   New Prescriptions    No medications on file   Previous Medications    AMILORIDE (MIDAMOR) 5 MG TAB    Take 2 tablets (10 mg total) by mouth once daily.    AMLODIPINE (NORVASC) 5 MG TABLET    Take  1 tablet (5 mg total) by mouth once daily.    BIOTIN ORAL    Take 10,000 mg by mouth once daily.     CALCIUM CARBONATE-VIT D3-MIN (CALTRATE 600+D PLUS MINERALS) 600 MG CALCIUM- 400 UNIT TAB    Take 1 tablet by mouth once daily.    CETIRIZINE (ZYRTEC) 10 MG TABLET    Take 10 mg by mouth once daily.    DEXTROMETHORPHAN-GUAIFENESIN  MG (MUCINEX DM)  MG PER 12 HR TABLET    Take 1 tablet by mouth once daily.    METOPROLOL SUCCINATE (TOPROL-XL) 100 MG 24 HR TABLET    Take 1 tablet (100 mg total) by mouth once daily.    MULTIVITAMIN CAPSULE    Take 1 capsule by mouth once daily.    -PROPYLENE GLYCOL, PF, (SYSTANE, PF,) 0.4-0.3 % DPET    Apply to eye.    POTASSIUM CHLORIDE SA (K-DUR,KLOR-CON) 20 MEQ TABLET    TAKE ONE TABLET BY MOUTH TWICE DAILY    ROSUVASTATIN (CRESTOR) 5 MG TABLET    TAKE 1 TABLET BY MOUTH NIGHTLY.    ZOLPIDEM (AMBIEN) 5 MG TAB    Take 1 tablet (5 mg total) by mouth nightly as needed.   Modified Medications    No medications on file   Discontinued Medications    No medications on file       Review of Systems   Constitutional: Negative.    HENT: Negative.    Eyes: Negative.    Respiratory: Negative for shortness of breath.    Cardiovascular: Negative for palpitations.   Gastrointestinal: Negative.    Genitourinary: Negative.    Musculoskeletal: Negative.    Skin: Negative.    Neurological: Negative.    Endo/Heme/Allergies: Negative.    Psychiatric/Behavioral: Negative.    All 12 systems otherwise negative.      Wt Readings from Last 3 Encounters:   09/25/20 69.5 kg (153 lb 3.5 oz)   08/14/20 68.8 kg (151 lb 10.8 oz)   07/16/20 68.9 kg (152 lb)     Temp Readings from Last 3 Encounters:   09/03/19 97.6 °F (36.4 °C)   05/06/19 96.3 °F (35.7 °C) (Tympanic)   04/01/19 97.7 °F (36.5 °C)     BP Readings from Last 3 Encounters:   09/25/20 126/83   08/14/20 (!) 140/94   07/16/20 (!) 150/92     Pulse Readings from Last 3 Encounters:   09/25/20 98   08/14/20 86   07/14/20 64       /83 (BP  Location: Right arm, Patient Position: Sitting, BP Method: Medium (Manual))   Pulse 98   Wt 69.5 kg (153 lb 3.5 oz)   SpO2 98%   BMI 27.14 kg/m²     Objective:   Physical Exam   Constitutional: She is oriented to person, place, and time. She appears well-developed and well-nourished. No distress.   HENT:   Head: Normocephalic and atraumatic.   Nose: Nose normal.   Mouth/Throat: Oropharynx is clear and moist.   Eyes: Conjunctivae and EOM are normal. No scleral icterus.   Neck: Normal range of motion. Neck supple. No JVD present. No thyromegaly present.   Cardiovascular: Normal rate, regular rhythm, S1 normal and S2 normal. Exam reveals no gallop, no S3, no S4 and no friction rub.   No murmur heard.  Pulmonary/Chest: Effort normal and breath sounds normal. No stridor. No respiratory distress. She has no wheezes. She has no rales. She exhibits no tenderness.   Abdominal: Soft. Bowel sounds are normal. She exhibits no distension and no mass. There is no abdominal tenderness. There is no rebound.   Genitourinary:    Genitourinary Comments: Deferred     Musculoskeletal: Normal range of motion.         General: No tenderness, deformity or edema.   Lymphadenopathy:     She has no cervical adenopathy.   Neurological: She is alert and oriented to person, place, and time. She exhibits normal muscle tone. Coordination normal.   Skin: Skin is warm and dry. No rash noted. She is not diaphoretic. No erythema. No pallor.   Psychiatric: She has a normal mood and affect. Her behavior is normal. Judgment and thought content normal.   Nursing note and vitals reviewed.      Lab Results   Component Value Date     07/06/2020    K 4.4 07/06/2020     07/06/2020    CO2 31 (H) 07/06/2020    BUN 19 07/06/2020    CREATININE 1.1 07/06/2020    GLU 84 07/06/2020    HGBA1C 5.7 05/03/2013    MG 2.0 07/05/2013    AST 28 06/15/2020    ALT 27 06/15/2020    ALBUMIN 4.1 07/06/2020    PROT 7.2 06/15/2020    BILITOT 0.6 06/15/2020    WBC  7.50 07/06/2020    HGB 12.5 07/06/2020    HCT 40.9 07/06/2020    MCV 96 07/06/2020     07/06/2020    TSH 0.908 06/30/2015    CHOL 166 06/15/2020    HDL 70 06/15/2020    LDLCALC 79.8 06/15/2020    TRIG 81 06/15/2020     Assessment:      1. HTN (hypertension), benign    2. Palpitations    3. Other hyperlipidemia        Plan:   1. Palpitations  - Zio patch - rate ectopy  - dec caffeine intake  - cont BB    2. HTN  - low salt diet - needs to improve diet and dec caffeine   - cont current meds  - will adjust meds if BP remains elevated  - has more stress with taking care of her mother    3. DAVIDSON - stable  - stress ECHO - negative  - R/O RASHAUN    4. HLD  - cont statin    Thank you for allowing me to participate in this patient's care. Please do not hesitate to contact me with any questions or concerns. Consult note has been forwarded to the referral physician.

## 2020-10-01 ENCOUNTER — PATIENT MESSAGE (OUTPATIENT)
Dept: OTHER | Facility: OTHER | Age: 65
End: 2020-10-01

## 2020-10-27 ENCOUNTER — PATIENT MESSAGE (OUTPATIENT)
Dept: NEPHROLOGY | Facility: CLINIC | Age: 65
End: 2020-10-27

## 2020-10-28 ENCOUNTER — PATIENT MESSAGE (OUTPATIENT)
Dept: INTERNAL MEDICINE | Facility: CLINIC | Age: 65
End: 2020-10-28

## 2020-10-28 ENCOUNTER — TELEPHONE (OUTPATIENT)
Dept: INTERNAL MEDICINE | Facility: CLINIC | Age: 65
End: 2020-10-28

## 2020-10-28 DIAGNOSIS — Z78.0 POSTMENOPAUSAL STATE: ICD-10-CM

## 2020-10-28 DIAGNOSIS — Z12.31 ENCOUNTER FOR SCREENING MAMMOGRAM FOR MALIGNANT NEOPLASM OF BREAST: Primary | ICD-10-CM

## 2020-10-28 NOTE — TELEPHONE ENCOUNTER
Patient is requesting an order for a mammogram and bone density test to be placed. States she would like to have testing done on the same day. Please advise.//ddw

## 2020-11-06 ENCOUNTER — OFFICE VISIT (OUTPATIENT)
Dept: DERMATOLOGY | Facility: CLINIC | Age: 65
End: 2020-11-06
Payer: MEDICARE

## 2020-11-06 DIAGNOSIS — L30.9 DERMATITIS: Primary | ICD-10-CM

## 2020-11-06 DIAGNOSIS — L81.1 MELASMA: ICD-10-CM

## 2020-11-06 PROCEDURE — 99999 PR PBB SHADOW E&M-EST. PATIENT-LVL III: ICD-10-PCS | Mod: PBBFAC,,, | Performed by: STUDENT IN AN ORGANIZED HEALTH CARE EDUCATION/TRAINING PROGRAM

## 2020-11-06 PROCEDURE — 99202 OFFICE O/P NEW SF 15 MIN: CPT | Mod: S$PBB,,, | Performed by: STUDENT IN AN ORGANIZED HEALTH CARE EDUCATION/TRAINING PROGRAM

## 2020-11-06 PROCEDURE — 99213 OFFICE O/P EST LOW 20 MIN: CPT | Mod: PBBFAC | Performed by: STUDENT IN AN ORGANIZED HEALTH CARE EDUCATION/TRAINING PROGRAM

## 2020-11-06 PROCEDURE — 99202 PR OFFICE/OUTPT VISIT, NEW, LEVL II, 15-29 MIN: ICD-10-PCS | Mod: S$PBB,,, | Performed by: STUDENT IN AN ORGANIZED HEALTH CARE EDUCATION/TRAINING PROGRAM

## 2020-11-06 PROCEDURE — 99999 PR PBB SHADOW E&M-EST. PATIENT-LVL III: CPT | Mod: PBBFAC,,, | Performed by: STUDENT IN AN ORGANIZED HEALTH CARE EDUCATION/TRAINING PROGRAM

## 2020-11-06 RX ORDER — TRETINOIN 0.25 MG/G
CREAM TOPICAL NIGHTLY
Qty: 45 G | Refills: 3 | Status: SHIPPED | OUTPATIENT
Start: 2020-11-06 | End: 2021-10-21

## 2020-11-06 RX ORDER — TRIAMCINOLONE ACETONIDE 1 MG/G
CREAM TOPICAL
Qty: 80 G | Refills: 1 | Status: SHIPPED | OUTPATIENT
Start: 2020-11-06 | End: 2021-11-29

## 2020-11-06 NOTE — PROGRESS NOTES
Subjective:       Patient ID:  Kacy Blackwell is a 64 y.o. female who presents for   Chief Complaint   Patient presents with    Rash    Skin Discoloration     History of Present Illness: The patient presents with chief complaint of a rash.  Location: around the neck  Duration: several weeks  Signs/Symptoms: redness, itching  Prior treatments: none        Review of Systems   Constitutional: Negative for fever and chills.        Objective:    Physical Exam   Constitutional: She appears well-developed and well-nourished. No distress.   Neurological: She is alert and oriented to person, place, and time. She is not disoriented.   Psychiatric: She has a normal mood and affect.   Skin:   Areas Examined (abnormalities noted in diagram):   Head / Face Inspection Performed  Neck Inspection Performed              Diagram Legend     Erythematous scaling macule/papule c/w actinic keratosis       Vascular papule c/w angioma      Pigmented verrucoid papule/plaque c/w seborrheic keratosis      Yellow umbilicated papule c/w sebaceous hyperplasia      Irregularly shaped tan macule c/w lentigo     1-2 mm smooth white papules consistent with Milia      Movable subcutaneous cyst with punctum c/w epidermal inclusion cyst      Subcutaneous movable cyst c/w pilar cyst      Firm pink to brown papule c/w dermatofibroma      Pedunculated fleshy papule(s) c/w skin tag(s)      Evenly pigmented macule c/w junctional nevus     Mildly variegated pigmented, slightly irregular-bordered macule c/w mildly atypical nevus      Flesh colored to evenly pigmented papule c/w intradermal nevus       Pink pearly papule/plaque c/w basal cell carcinoma      Erythematous hyperkeratotic cursted plaque c/w SCC      Surgical scar with no sign of skin cancer recurrence      Open and closed comedones      Inflammatory papules and pustules      Verrucoid papule consistent consistent with wart     Erythematous eczematous patches and plaques     Dystrophic  onycholytic nail with subungual debris c/w onychomycosis     Umbilicated papule    Erythematous-base heme-crusted tan verrucoid plaque consistent with inflamed seborrheic keratosis     Erythematous Silvery Scaling Plaque c/w Psoriasis     See annotation      Assessment / Plan:        Dermatitis  -     triamcinolone acetonide 0.1% (KENALOG) 0.1 % cream; AAA bid  Dispense: 80 g; Refill: 1  -     Counseled patient on gentle skin care regimen, including need for sensitive soaps/detergents, as well as need for frequent use of sensitize moisturizers.       Melasma  -     tretinoin (RETIN-A) 0.025 % cream; Apply topically every evening.  Dispense: 45 g; Refill: 3             Follow up in about 3 months (around 2/6/2021).

## 2020-11-11 ENCOUNTER — TELEPHONE (OUTPATIENT)
Dept: PHARMACY | Facility: CLINIC | Age: 65
End: 2020-11-11

## 2020-11-11 NOTE — TELEPHONE ENCOUNTER
Good morning Dr. Malave,     I am reaching out from [facility] in regards to the prescription, Tretinoin cream, that required a prior authorization with the patient's insurance. The prior authorization was submitted and APPROVED through 12/31/2021. The patient has been notified and will  the prescription today.       Please let me know if you have any questions or concerns. Thank you for your time!    Norma Kay, PharmD  [facility]

## 2020-12-11 ENCOUNTER — PATIENT MESSAGE (OUTPATIENT)
Dept: OTHER | Facility: OTHER | Age: 65
End: 2020-12-11

## 2020-12-11 ENCOUNTER — HOSPITAL ENCOUNTER (OUTPATIENT)
Dept: RADIOLOGY | Facility: HOSPITAL | Age: 65
Discharge: HOME | End: 2020-12-11
Attending: PHYSICIAN ASSISTANT
Payer: MEDICARE

## 2020-12-11 DIAGNOSIS — Z12.31 ENCOUNTER FOR SCREENING MAMMOGRAM FOR MALIGNANT NEOPLASM OF BREAST: ICD-10-CM

## 2020-12-11 PROCEDURE — 77067 SCR MAMMO BI INCL CAD: CPT | Mod: 26,,, | Performed by: RADIOLOGY

## 2020-12-11 PROCEDURE — 77067 SCR MAMMO BI INCL CAD: CPT | Mod: TC

## 2020-12-11 PROCEDURE — 77063 MAMMO DIGITAL SCREENING BILAT WITH TOMO: ICD-10-PCS | Mod: 26,,, | Performed by: RADIOLOGY

## 2020-12-11 PROCEDURE — 77063 BREAST TOMOSYNTHESIS BI: CPT | Mod: 26,,, | Performed by: RADIOLOGY

## 2020-12-11 PROCEDURE — 77067 MAMMO DIGITAL SCREENING BILAT WITH TOMO: ICD-10-PCS | Mod: 26,,, | Performed by: RADIOLOGY

## 2020-12-11 RX ORDER — AMLODIPINE BESYLATE 5 MG/1
5 TABLET ORAL DAILY
Qty: 30 TABLET | Refills: 3 | Status: SHIPPED | OUTPATIENT
Start: 2020-12-11 | End: 2021-05-27

## 2020-12-14 ENCOUNTER — OFFICE VISIT (OUTPATIENT)
Dept: OTOLARYNGOLOGY | Facility: CLINIC | Age: 65
End: 2020-12-14
Payer: MEDICARE

## 2020-12-14 ENCOUNTER — PATIENT MESSAGE (OUTPATIENT)
Dept: INTERNAL MEDICINE | Facility: CLINIC | Age: 65
End: 2020-12-14

## 2020-12-14 ENCOUNTER — PATIENT OUTREACH (OUTPATIENT)
Dept: ADMINISTRATIVE | Facility: OTHER | Age: 65
End: 2020-12-14

## 2020-12-14 VITALS
WEIGHT: 154.13 LBS | HEART RATE: 74 BPM | BODY MASS INDEX: 27.3 KG/M2 | DIASTOLIC BLOOD PRESSURE: 77 MMHG | SYSTOLIC BLOOD PRESSURE: 132 MMHG | TEMPERATURE: 98 F

## 2020-12-14 DIAGNOSIS — H61.23 BILATERAL IMPACTED CERUMEN: Primary | ICD-10-CM

## 2020-12-14 PROCEDURE — 99999 PR PBB SHADOW E&M-EST. PATIENT-LVL III: ICD-10-PCS | Mod: PBBFAC,,, | Performed by: PHYSICIAN ASSISTANT

## 2020-12-14 PROCEDURE — 99499 UNLISTED E&M SERVICE: CPT | Mod: S$PBB,,, | Performed by: PHYSICIAN ASSISTANT

## 2020-12-14 PROCEDURE — 99213 OFFICE O/P EST LOW 20 MIN: CPT | Mod: PBBFAC,25 | Performed by: PHYSICIAN ASSISTANT

## 2020-12-14 PROCEDURE — 69210 PR REMOVAL IMPACTED CERUMEN REQUIRING INSTRUMENTATION, UNILATERAL: ICD-10-PCS | Mod: S$PBB,,, | Performed by: PHYSICIAN ASSISTANT

## 2020-12-14 PROCEDURE — 69210 REMOVE IMPACTED EAR WAX UNI: CPT | Mod: S$PBB,,, | Performed by: PHYSICIAN ASSISTANT

## 2020-12-14 PROCEDURE — 99999 PR PBB SHADOW E&M-EST. PATIENT-LVL III: CPT | Mod: PBBFAC,,, | Performed by: PHYSICIAN ASSISTANT

## 2020-12-14 PROCEDURE — 69210 REMOVE IMPACTED EAR WAX UNI: CPT | Mod: PBBFAC | Performed by: PHYSICIAN ASSISTANT

## 2020-12-14 PROCEDURE — 99499 NO LOS: ICD-10-PCS | Mod: S$PBB,,, | Performed by: PHYSICIAN ASSISTANT

## 2020-12-14 NOTE — PROGRESS NOTES
Subjective:   Patient ID: Kacy Blackwell is a 65 y.o. female.    Chief Complaint: Cerumen Impaction    Patient is here to see me today for evaluation of a possible wax impaction in bilateral ears.  She has complaints of hearing loss in the affected ears, but denies pain or drainage.  This has been an issue in the past.  The patient has not been using any sort of ear drop to soften the wax.    Review of patient's allergies indicates:   Allergen Reactions    Diovan [valsartan]      angioedema           Review of Systems   HENT: Positive for hearing loss.          Objective:   /77   Pulse 74   Temp 98.2 °F (36.8 °C) (Temporal)   Wt 69.9 kg (154 lb 1.6 oz)   BMI 27.30 kg/m²     Physical Exam  HENT:      Right Ear: There is impacted cerumen.      Left Ear: There is impacted cerumen.        Procedure Note    CHIEF COMPLAINT:  Cerumen Impaction    Description:  The patient was seated in an exam chair.  An ear speculum was placed in the right EAC and was examined under the microscope.  Suction and/or loop curettes were used to remove a large cerumen impaction.  The tympanic membrane was visualized and was normal in appearance.  The procedure was repeated on the left side in a similar fashion.  The TM was intact and normal on this side as well.  The patient tolerated the procedure well.    Assessment:     1. Bilateral impacted cerumen        Plan:     Bilateral impacted cerumen       Cerumen impaction:  Removed today without difficulty.  I would recommend the use of a wax softening drop, either over the counter Debrox or mineral oil, on a weekly basis.  I also instructed the patient to avoid Qtips.

## 2020-12-14 NOTE — PATIENT INSTRUCTIONS
Vitamin D  Does this test have other names?  25-hydroxyvitamin D (25-high-DROX-ee-VIE-tuh-min D), 25(OH)D  What is this test?  Vitamin D is mainly found in fortified dairy foods, juice, breakfast cereal, and certain fish. This vitamin plays many roles in the body. But because it helps the body absorb calcium from foods and supplements, it's particularly important for bone health. Vitamin D has many additional roles in the body.  Vitamin D comes in several forms. When ultraviolet light, such as sunlight, hits your skin, it creates vitamin D3. D2 is used to fortify dairy foods. Both of these are further processed by your liver and kidneys into a form your body can use. Most tests for vitamin D check the level of a form circulating in the body called 25-hydroxyvitamin D, also called 25(OH)D.   Why do I need this test?  Vitamin D testing has become much more popular in recent years. Your healthcare provider may check your vitamin D levels to find out if you have any risks to bone health. These might be:  · Low calcium  · Soft bones caused by low vitamin D or problems using it (osteomalacia)  · Osteopenia  · Osteoporosis  · Rickets, in children  You may also need this test if you are at risk for low vitamin D levels. Risks include:  · Being an older adult  · Having difficulty absorbing fat from your diet  · Having chronic kidney disease  · Have dark skin pigmentation  · Being a  baby  Vitamin D has many effects in the body. You may need this test to help your provider diagnose or treat:  · Problems with the parathyroid gland  · Cancer  · Autoimmune diseases such as multiple sclerosis and Crohn's disease  · Psoriasis  · Asthma  · Weakness or falls    What other tests might I have along with this test?  A healthcare provider may also want to check your parathyroid hormone levels and your calcium levels.   What do my test results mean?  A result for a lab test may be affected by many things, including the method  the laboratory uses to do the test. If your test results are different from the normal value, you may not have a problem. To learn what the results mean for you, talk with your healthcare provider.  Children and adults need more than 30 nanograms per milliliter (ng/ml) of vitamin D. The optimal level of 25(OH)D is usually between 30 and 60 ng/mL. Recommended daily amounts range from 400 to 800 international units (IU) per day based on your age.  Levels lower than normal can mean you are:  · Not making enough vitamin D on your own  · Not getting enough vitamin D in your diet  · Not absorbing vitamin D from your food as you should  Lower levels may also mean that your body is not converting the vitamin as it should. This might be because of kidney or liver disease.  Above-normal levels may be a sign that you're taking too much in supplement form.   How is this test done?  The test requires a blood sample, which is drawn through a needle from a vein in your arm.  Does this test pose any risks?  Taking a blood sample with a needle carries risks that include bleeding, infection, bruising, and a sense of lightheadedness. When the needle pricks your arm, you may feel a slight stinging sensation or pain. Afterward, the site may be slightly sore.   What might affect my test results?  The amount of time you spend in the sunlight, your diet, and whether you take vitamin D in supplement form can affect your vitamin D levels. Ask your healthcare provider if any health conditions you have or medicines you take could affect your results.  How do I get ready for this test?  Tell your healthcare provider if you take vitamin D supplements. Also be sure your provider knows about all medicines, herbs, vitamins, and supplements you are taking. This includes medicines that don't need a prescription and any illicit drugs you may use.   © 2862-7090 [employer]. [address] [place] [address], [place] [address]. All rights reserved.  This information is not intended as a substitute for professional medical care. Always follow your healthcare professional's instructions.        Preventing Osteoporosis: Avoiding Bone Loss  Certain factors can speed up bone loss or decrease bone growth. For example, alcohol, cigarettes, and certain medicines reduce bone mass. Some foods make it hard for your body to absorb calcium.    Things to avoid  Here are things to avoid to help prevent osteoporosis:  · Alcohol is toxic to bones. It is a major cause of bone loss. Heavy drinking can cause osteoporosis even if you have no other risk factors.  · Smoking reduces bone mass. Smoking may also interfere with estrogen levels and cause early menopause.  · Inactivity makes your bones lose strength and become thinner. Over time, thin bones may break. Women who aren't active are at a high risk for osteoporosis.  · Certain medicines, such as cortisone, increase bone loss. They also decrease bone growth. Ask your healthcare provider about any side effects of your medicines, and how to prevent them.  · Protein-rich or salty foods eaten in large amounts may deplete calcium.  · Caffeine increases calcium loss. People who drink a lot of coffee, tea, or marli lose more calcium than those who don't.  Date Last Reviewed: 10/17/2015  © 9182-2606 [employer]. [address] [place] [address], [place] [address]. All rights reserved. This information is not intended as a substitute for professional medical care. Always follow your healthcare professional's instructions.        Preventing Osteoporosis: Staying Active  Certain factors can speed up bone loss or decrease bone growth. For example, a lack of activity makes bones lose their strength. Exercise plays a big part in maintaining bone mass, no matter what your age. The amount and type of activity you do also play a part in keeping your bones strong. Weight-bearing and resistance exercises, such as walking, aerobic dancing, and  bicycling, are just a few of the activities that are good for your bones.     Resistance exercises, such as weight training, help maintain bones by strengthening the muscles around them. Swimming is also a good choice.     · Check with your healthcare provider before starting any new exercise program.  · Stop any exercise that causes pain.   Date Last Reviewed: 10/17/2015  © 0226-0636 [employer]. [address] [place] [address], [place] [address]. All rights reserved. This information is not intended as a substitute for professional medical care. Always follow your healthcare professional's instructions.        Osteoporosis: Screening for Bone Loss     Quantitative ultrasound (QUS)     The strength of bones is measured by their density (thickness). High bone density means bones are less likely to fracture. If you are at risk for bone loss, your healthcare provider may refer you for bone density testing.  Bone density testing  Bone density testing is safe, quick, easy, and painless. It can detect osteoporosis before a fracture happens. It can also measure the response to treatment. There are several types of tests that you may have, including:  · Central tests are used for screening and diagnosis. They measure density in the hip and spine. The main central test is the dual energy X-ray absorptiometry (DXA). The DXA is the standard bone density test.  · Peripheral tests are used for screening. They measure density in the finger, wrist, knee, shin, or heel. A common peripheral test is the quantitative ultrasound (QUS). However, QUS screening is not as accurate or widely accepted as DXA screening.   Who should be tested?  · All postmenopausal women under age 65, with one or more risk factors in addition to menopause.  · All women age 65 and older.  · Postmenopausal women with fractures.  · Women who are thinking about treatment for osteoporosis.  · Women who have been on hormone therapy for a long time.  · Men or  women with certain medical conditions or who are taking certain medications (such as glucocorticoids or prednisone) for a long period.  Common testing sites  Any bone can fracture, but with osteoporosis some bones fracture more easily. These include bones in the spine, wrist, shoulder, and hip. Thats why bone density testing may be done at one or more of these sites.  Understanding your results  The results of your test may seem confusing at first. Dont be afraid to ask your provider to explain. Your healthcare provider will compare your bone mineral density (BMD) with the BMD of young, healthy bone. The result is called a T-score. Bones remodel at different rates. So, a healthy T-score in the wrist doesnt mean the spine is also healthy. Thats why more than one site may be scanned.  Date Last Reviewed: 10/11/2015  © 0536-9309 [employer]. [address] [place] [address]. All rights reserved. This information is not intended as a substitute for professional medical care. Always follow your healthcare professional's instructions.        Osteoporosis: Understanding Bone Loss     A balanced system keeps building and resorbing bone.   The body has a natural system for maintaining bone. Understanding this system can help you learn how to maintain your bones.  A balanced system supports the body  The body is always losing (resorbing) and making bone. This process is called remodeling. Bone-resorbing cells take bone apart. They do this so the minerals can be used to repair an injury or make new bone. Bone-making cells form new bone using calcium and other minerals. These minerals come from the food you eat. When this bone-making system is in balance, the same amount of bone is built and resorbed.        An unbalanced system cant give support     An unblalanced system builds too little bone and resorbs too much.   Changes in hormone levels, activity, medications, or diet can affect the  bone-making system. When the system gets out of balance, the amount of bone lost is greater than the amount of bone made. This can cause osteopenia (when bone starts to become less dense). Left untreated, bone loss gets worse, leading to osteoporosis. Weak bones cant support the body. In fact, they can fracture just from the weight of your body. This often happens in vertebrae (bones of the spine). When vertebrae fracture, parts of the spine compress. This causes the back to bend or hump over, and it can also cause back pain.  Date Last Reviewed: 10/11/2015  © 4552-0881 [employer]. [address] [place] [address], [place] [address]. All rights reserved. This information is not intended as a substitute for professional medical care. Always follow your healthcare professional's instructions.

## 2020-12-17 ENCOUNTER — PATIENT MESSAGE (OUTPATIENT)
Dept: INTERNAL MEDICINE | Facility: CLINIC | Age: 65
End: 2020-12-17

## 2020-12-17 NOTE — TELEPHONE ENCOUNTER
Per patient she has viewed Dexa Scan results via my chart. Would like to know if you have any recommendations pertaining to results. Please advise.//ddw

## 2021-01-19 DIAGNOSIS — I10 HTN (HYPERTENSION), BENIGN: ICD-10-CM

## 2021-01-19 RX ORDER — METOPROLOL SUCCINATE 100 MG/1
100 TABLET, EXTENDED RELEASE ORAL DAILY
Qty: 30 TABLET | Refills: 11 | Status: SHIPPED | OUTPATIENT
Start: 2021-01-19 | End: 2022-01-24 | Stop reason: SDUPTHER

## 2021-01-22 ENCOUNTER — LAB VISIT (OUTPATIENT)
Dept: LAB | Facility: HOSPITAL | Age: 66
End: 2021-01-22
Attending: INTERNAL MEDICINE
Payer: MEDICARE

## 2021-01-22 DIAGNOSIS — N18.2 CHRONIC KIDNEY DISEASE (CKD), STAGE II (MILD): ICD-10-CM

## 2021-01-22 LAB
ALBUMIN SERPL BCP-MCNC: 4 G/DL (ref 3.5–5.2)
ANION GAP SERPL CALC-SCNC: 8 MMOL/L (ref 8–16)
BASOPHILS # BLD AUTO: 0 K/UL (ref 0–0.2)
BASOPHILS NFR BLD: 0 % (ref 0–1.9)
BUN SERPL-MCNC: 15 MG/DL (ref 8–23)
CALCIUM SERPL-MCNC: 9.3 MG/DL (ref 8.7–10.5)
CHLORIDE SERPL-SCNC: 105 MMOL/L (ref 95–110)
CO2 SERPL-SCNC: 28 MMOL/L (ref 23–29)
CREAT SERPL-MCNC: 1 MG/DL (ref 0.5–1.4)
DIFFERENTIAL METHOD: NORMAL
EOSINOPHIL # BLD AUTO: 0.1 K/UL (ref 0–0.5)
EOSINOPHIL NFR BLD: 2 % (ref 0–8)
ERYTHROCYTE [DISTWIDTH] IN BLOOD BY AUTOMATED COUNT: 13.3 % (ref 11.5–14.5)
EST. GFR  (AFRICAN AMERICAN): >60 ML/MIN/1.73 M^2
EST. GFR  (NON AFRICAN AMERICAN): 59 ML/MIN/1.73 M^2
GLUCOSE SERPL-MCNC: 83 MG/DL (ref 70–110)
HCT VFR BLD AUTO: 38.5 % (ref 37–48.5)
HGB BLD-MCNC: 12.5 G/DL (ref 12–16)
IMM GRANULOCYTES # BLD AUTO: 0.02 K/UL (ref 0–0.04)
IMM GRANULOCYTES NFR BLD AUTO: 0.4 % (ref 0–0.5)
LYMPHOCYTES # BLD AUTO: 2.1 K/UL (ref 1–4.8)
LYMPHOCYTES NFR BLD: 38.1 % (ref 18–48)
MCH RBC QN AUTO: 30.2 PG (ref 27–31)
MCHC RBC AUTO-ENTMCNC: 32.5 G/DL (ref 32–36)
MCV RBC AUTO: 93 FL (ref 82–98)
MONOCYTES # BLD AUTO: 0.4 K/UL (ref 0.3–1)
MONOCYTES NFR BLD: 7.2 % (ref 4–15)
NEUTROPHILS # BLD AUTO: 2.8 K/UL (ref 1.8–7.7)
NEUTROPHILS NFR BLD: 52.3 % (ref 38–73)
NRBC BLD-RTO: 0 /100 WBC
PHOSPHATE SERPL-MCNC: 3.4 MG/DL (ref 2.7–4.5)
PLATELET # BLD AUTO: 238 K/UL (ref 150–350)
PMV BLD AUTO: 9.8 FL (ref 9.2–12.9)
POTASSIUM SERPL-SCNC: 3.9 MMOL/L (ref 3.5–5.1)
RBC # BLD AUTO: 4.14 M/UL (ref 4–5.4)
SODIUM SERPL-SCNC: 141 MMOL/L (ref 136–145)
WBC # BLD AUTO: 5.38 K/UL (ref 3.9–12.7)

## 2021-01-22 PROCEDURE — 85025 COMPLETE CBC W/AUTO DIFF WBC: CPT

## 2021-01-22 PROCEDURE — 80069 RENAL FUNCTION PANEL: CPT

## 2021-01-22 PROCEDURE — 36415 COLL VENOUS BLD VENIPUNCTURE: CPT

## 2021-02-01 ENCOUNTER — OFFICE VISIT (OUTPATIENT)
Dept: NEPHROLOGY | Facility: CLINIC | Age: 66
End: 2021-02-01
Payer: MEDICARE

## 2021-02-01 ENCOUNTER — PATIENT MESSAGE (OUTPATIENT)
Dept: NEPHROLOGY | Facility: CLINIC | Age: 66
End: 2021-02-01

## 2021-02-01 DIAGNOSIS — I10 HTN (HYPERTENSION), BENIGN: Primary | ICD-10-CM

## 2021-02-01 DIAGNOSIS — N18.2 CHRONIC KIDNEY DISEASE (CKD), STAGE II (MILD): ICD-10-CM

## 2021-02-01 PROCEDURE — 99214 OFFICE O/P EST MOD 30 MIN: CPT | Mod: 95,,, | Performed by: INTERNAL MEDICINE

## 2021-02-01 PROCEDURE — 99214 PR OFFICE/OUTPT VISIT, EST, LEVL IV, 30-39 MIN: ICD-10-PCS | Mod: 95,,, | Performed by: INTERNAL MEDICINE

## 2021-02-08 ENCOUNTER — PATIENT OUTREACH (OUTPATIENT)
Dept: ADMINISTRATIVE | Facility: OTHER | Age: 66
End: 2021-02-08

## 2021-02-09 ENCOUNTER — OFFICE VISIT (OUTPATIENT)
Dept: DERMATOLOGY | Facility: CLINIC | Age: 66
End: 2021-02-09
Payer: MEDICARE

## 2021-02-09 DIAGNOSIS — L30.9 DERMATITIS: ICD-10-CM

## 2021-02-09 DIAGNOSIS — L65.9 HAIR LOSS: Primary | ICD-10-CM

## 2021-02-09 PROCEDURE — 99214 PR OFFICE/OUTPT VISIT, EST, LEVL IV, 30-39 MIN: ICD-10-PCS | Mod: S$PBB,,, | Performed by: STUDENT IN AN ORGANIZED HEALTH CARE EDUCATION/TRAINING PROGRAM

## 2021-02-09 PROCEDURE — 99999 PR PBB SHADOW E&M-EST. PATIENT-LVL III: ICD-10-PCS | Mod: PBBFAC,,, | Performed by: STUDENT IN AN ORGANIZED HEALTH CARE EDUCATION/TRAINING PROGRAM

## 2021-02-09 PROCEDURE — 99213 OFFICE O/P EST LOW 20 MIN: CPT | Mod: PBBFAC | Performed by: STUDENT IN AN ORGANIZED HEALTH CARE EDUCATION/TRAINING PROGRAM

## 2021-02-09 PROCEDURE — 99999 PR PBB SHADOW E&M-EST. PATIENT-LVL III: CPT | Mod: PBBFAC,,, | Performed by: STUDENT IN AN ORGANIZED HEALTH CARE EDUCATION/TRAINING PROGRAM

## 2021-02-09 PROCEDURE — 99214 OFFICE O/P EST MOD 30 MIN: CPT | Mod: S$PBB,,, | Performed by: STUDENT IN AN ORGANIZED HEALTH CARE EDUCATION/TRAINING PROGRAM

## 2021-02-09 RX ORDER — TRETINOIN 0.5 MG/G
CREAM TOPICAL NIGHTLY
Qty: 45 G | Refills: 3 | Status: SHIPPED | OUTPATIENT
Start: 2021-02-09 | End: 2021-11-29

## 2021-02-09 RX ORDER — FLUOCINOLONE ACETONIDE 0.11 MG/ML
OIL TOPICAL DAILY
Qty: 118.28 ML | Refills: 1 | Status: SHIPPED | OUTPATIENT
Start: 2021-02-09 | End: 2021-11-29

## 2021-02-11 DIAGNOSIS — I10 HTN (HYPERTENSION), BENIGN: ICD-10-CM

## 2021-02-12 RX ORDER — AMILORIDE HYDROCHLORIDE 5 MG/1
10 TABLET ORAL DAILY
Qty: 60 TABLET | Refills: 11 | Status: SHIPPED | OUTPATIENT
Start: 2021-02-12 | End: 2022-02-11 | Stop reason: SDUPTHER

## 2021-03-09 ENCOUNTER — LAB VISIT (OUTPATIENT)
Dept: LAB | Facility: HOSPITAL | Age: 66
End: 2021-03-09
Attending: FAMILY MEDICINE
Payer: MEDICARE

## 2021-03-09 ENCOUNTER — OFFICE VISIT (OUTPATIENT)
Dept: INTERNAL MEDICINE | Facility: CLINIC | Age: 66
End: 2021-03-09
Payer: MEDICARE

## 2021-03-09 VITALS
DIASTOLIC BLOOD PRESSURE: 76 MMHG | WEIGHT: 152.56 LBS | OXYGEN SATURATION: 99 % | SYSTOLIC BLOOD PRESSURE: 104 MMHG | BODY MASS INDEX: 27.03 KG/M2 | HEART RATE: 66 BPM | HEIGHT: 63 IN | TEMPERATURE: 97 F

## 2021-03-09 DIAGNOSIS — L29.9 ITCHING: ICD-10-CM

## 2021-03-09 DIAGNOSIS — R79.9 ABNORMAL BLOOD CHEMISTRY: Primary | ICD-10-CM

## 2021-03-09 PROCEDURE — 99999 PR PBB SHADOW E&M-EST. PATIENT-LVL IV: CPT | Mod: PBBFAC,,, | Performed by: FAMILY MEDICINE

## 2021-03-09 PROCEDURE — 80053 COMPREHEN METABOLIC PANEL: CPT | Performed by: FAMILY MEDICINE

## 2021-03-09 PROCEDURE — 99214 OFFICE O/P EST MOD 30 MIN: CPT | Mod: S$PBB,,, | Performed by: FAMILY MEDICINE

## 2021-03-09 PROCEDURE — 99999 PR PBB SHADOW E&M-EST. PATIENT-LVL IV: ICD-10-PCS | Mod: PBBFAC,,, | Performed by: FAMILY MEDICINE

## 2021-03-09 PROCEDURE — 36415 COLL VENOUS BLD VENIPUNCTURE: CPT | Performed by: FAMILY MEDICINE

## 2021-03-09 PROCEDURE — 99214 PR OFFICE/OUTPT VISIT, EST, LEVL IV, 30-39 MIN: ICD-10-PCS | Mod: S$PBB,,, | Performed by: FAMILY MEDICINE

## 2021-03-09 PROCEDURE — 84443 ASSAY THYROID STIM HORMONE: CPT | Performed by: FAMILY MEDICINE

## 2021-03-09 PROCEDURE — 99214 OFFICE O/P EST MOD 30 MIN: CPT | Mod: PBBFAC | Performed by: FAMILY MEDICINE

## 2021-03-09 RX ORDER — HYDROXYZINE HYDROCHLORIDE 25 MG/1
25 TABLET, FILM COATED ORAL NIGHTLY PRN
Qty: 30 TABLET | Refills: 5 | Status: SHIPPED | OUTPATIENT
Start: 2021-03-09

## 2021-03-10 LAB
ALBUMIN SERPL BCP-MCNC: 4.2 G/DL (ref 3.5–5.2)
ALP SERPL-CCNC: 83 U/L (ref 55–135)
ALT SERPL W/O P-5'-P-CCNC: 32 U/L (ref 10–44)
ANION GAP SERPL CALC-SCNC: 12 MMOL/L (ref 8–16)
AST SERPL-CCNC: 28 U/L (ref 10–40)
BILIRUB SERPL-MCNC: 0.6 MG/DL (ref 0.1–1)
BUN SERPL-MCNC: 19 MG/DL (ref 8–23)
CALCIUM SERPL-MCNC: 9.7 MG/DL (ref 8.7–10.5)
CHLORIDE SERPL-SCNC: 105 MMOL/L (ref 95–110)
CO2 SERPL-SCNC: 25 MMOL/L (ref 23–29)
CREAT SERPL-MCNC: 1.1 MG/DL (ref 0.5–1.4)
EST. GFR  (AFRICAN AMERICAN): >60 ML/MIN/1.73 M^2
EST. GFR  (NON AFRICAN AMERICAN): 52.4 ML/MIN/1.73 M^2
GLUCOSE SERPL-MCNC: 74 MG/DL (ref 70–110)
POTASSIUM SERPL-SCNC: 4.6 MMOL/L (ref 3.5–5.1)
PROT SERPL-MCNC: 7.7 G/DL (ref 6–8.4)
SODIUM SERPL-SCNC: 142 MMOL/L (ref 136–145)
TSH SERPL DL<=0.005 MIU/L-ACNC: 0.9 UIU/ML (ref 0.4–4)

## 2021-04-07 RX ORDER — ROSUVASTATIN CALCIUM 5 MG/1
5 TABLET, COATED ORAL NIGHTLY
Qty: 90 TABLET | Refills: 4 | Status: SHIPPED | OUTPATIENT
Start: 2021-04-07 | End: 2022-04-09

## 2021-05-03 DIAGNOSIS — R06.00 DYSPNEA, UNSPECIFIED TYPE: Primary | ICD-10-CM

## 2021-05-21 ENCOUNTER — PATIENT MESSAGE (OUTPATIENT)
Dept: DERMATOLOGY | Facility: CLINIC | Age: 66
End: 2021-05-21

## 2021-05-25 RX ORDER — FLUOCINOLONE ACETONIDE, HYDROQUINONE, AND TRETINOIN .1; 40; .5 MG/G; MG/G; MG/G
CREAM TOPICAL
Qty: 1 TUBE | Refills: 2 | Status: SHIPPED | OUTPATIENT
Start: 2021-05-25 | End: 2021-11-29

## 2021-05-27 ENCOUNTER — CLINICAL SUPPORT (OUTPATIENT)
Dept: INTERNAL MEDICINE | Facility: CLINIC | Age: 66
End: 2021-05-27
Payer: MEDICARE

## 2021-05-27 ENCOUNTER — HOSPITAL ENCOUNTER (OUTPATIENT)
Dept: CARDIOLOGY | Facility: HOSPITAL | Age: 66
Discharge: HOME | End: 2021-05-27
Payer: MEDICARE

## 2021-05-27 ENCOUNTER — OFFICE VISIT (OUTPATIENT)
Dept: INTERNAL MEDICINE | Facility: CLINIC | Age: 66
End: 2021-05-27
Payer: MEDICARE

## 2021-05-27 VITALS
TEMPERATURE: 97 F | HEART RATE: 65 BPM | SYSTOLIC BLOOD PRESSURE: 121 MMHG | BODY MASS INDEX: 26.17 KG/M2 | DIASTOLIC BLOOD PRESSURE: 76 MMHG | OXYGEN SATURATION: 97 % | WEIGHT: 147.69 LBS | HEIGHT: 63 IN

## 2021-05-27 DIAGNOSIS — R53.83 FATIGUE, UNSPECIFIED TYPE: ICD-10-CM

## 2021-05-27 DIAGNOSIS — R06.00 DYSPNEA, UNSPECIFIED TYPE: ICD-10-CM

## 2021-05-27 DIAGNOSIS — M85.80 OSTEOPENIA, UNSPECIFIED LOCATION: ICD-10-CM

## 2021-05-27 DIAGNOSIS — E26.9 HYPERALDOSTERONISM: ICD-10-CM

## 2021-05-27 DIAGNOSIS — Z00.00 ROUTINE HEALTH MAINTENANCE: Primary | ICD-10-CM

## 2021-05-27 DIAGNOSIS — E78.5 HYPERLIPIDEMIA, UNSPECIFIED HYPERLIPIDEMIA TYPE: ICD-10-CM

## 2021-05-27 DIAGNOSIS — I10 HTN (HYPERTENSION), BENIGN: ICD-10-CM

## 2021-05-27 DIAGNOSIS — R79.9 ABNORMAL BLOOD CHEMISTRY: Primary | ICD-10-CM

## 2021-05-27 DIAGNOSIS — H91.93 BILATERAL HEARING LOSS, UNSPECIFIED HEARING LOSS TYPE: ICD-10-CM

## 2021-05-27 LAB
ALBUMIN SERPL BCP-MCNC: 3.9 G/DL (ref 3.5–5.2)
ALP SERPL-CCNC: 77 U/L (ref 55–135)
ALT SERPL W/O P-5'-P-CCNC: 25 U/L (ref 10–44)
ANION GAP SERPL CALC-SCNC: 8 MMOL/L (ref 8–16)
AST SERPL-CCNC: 23 U/L (ref 10–40)
BILIRUB SERPL-MCNC: 0.6 MG/DL (ref 0.1–1)
BUN SERPL-MCNC: 12 MG/DL (ref 8–23)
CALCIUM SERPL-MCNC: 9.4 MG/DL (ref 8.7–10.5)
CHLORIDE SERPL-SCNC: 106 MMOL/L (ref 95–110)
CHOLEST SERPL-MCNC: 144 MG/DL (ref 120–199)
CHOLEST/HDLC SERPL: 2.6 {RATIO} (ref 2–5)
CO2 SERPL-SCNC: 28 MMOL/L (ref 23–29)
CREAT SERPL-MCNC: 1 MG/DL (ref 0.5–1.4)
ERYTHROCYTE [DISTWIDTH] IN BLOOD BY AUTOMATED COUNT: 12.8 % (ref 11.5–14.5)
EST. GFR  (AFRICAN AMERICAN): >60 ML/MIN/1.73 M^2
EST. GFR  (NON AFRICAN AMERICAN): 59 ML/MIN/1.73 M^2
GLUCOSE SERPL-MCNC: 105 MG/DL (ref 70–110)
HCT VFR BLD AUTO: 40 % (ref 37–48.5)
HDLC SERPL-MCNC: 56 MG/DL (ref 40–75)
HDLC SERPL: 38.9 % (ref 20–50)
HGB BLD-MCNC: 12.8 G/DL (ref 12–16)
LDLC SERPL CALC-MCNC: 69.2 MG/DL (ref 63–159)
MCH RBC QN AUTO: 29.6 PG (ref 27–31)
MCHC RBC AUTO-ENTMCNC: 32 G/DL (ref 32–36)
MCV RBC AUTO: 93 FL (ref 82–98)
NONHDLC SERPL-MCNC: 88 MG/DL
PLATELET # BLD AUTO: 256 K/UL (ref 150–450)
PMV BLD AUTO: 10 FL (ref 9.2–12.9)
POTASSIUM SERPL-SCNC: 3.9 MMOL/L (ref 3.5–5.1)
PROT SERPL-MCNC: 7 G/DL (ref 6–8.4)
RBC # BLD AUTO: 4.32 M/UL (ref 4–5.4)
SODIUM SERPL-SCNC: 142 MMOL/L (ref 136–145)
TRIGL SERPL-MCNC: 94 MG/DL (ref 30–150)
WBC # BLD AUTO: 5.76 K/UL (ref 3.9–12.7)

## 2021-05-27 PROCEDURE — 99213 OFFICE O/P EST LOW 20 MIN: CPT | Mod: S$PBB,,, | Performed by: FAMILY MEDICINE

## 2021-05-27 PROCEDURE — 93010 ELECTROCARDIOGRAM REPORT: CPT | Mod: ,,, | Performed by: INTERNAL MEDICINE

## 2021-05-27 PROCEDURE — 99999 PR PBB SHADOW E&M-EST. PATIENT-LVL V: CPT | Mod: PBBFAC,,, | Performed by: FAMILY MEDICINE

## 2021-05-27 PROCEDURE — 93010 EKG 12-LEAD: ICD-10-PCS | Mod: ,,, | Performed by: INTERNAL MEDICINE

## 2021-05-27 PROCEDURE — 93005 ELECTROCARDIOGRAM TRACING: CPT

## 2021-05-27 PROCEDURE — 85027 COMPLETE CBC AUTOMATED: CPT | Performed by: FAMILY MEDICINE

## 2021-05-27 PROCEDURE — 99215 OFFICE O/P EST HI 40 MIN: CPT | Mod: PBBFAC,25 | Performed by: FAMILY MEDICINE

## 2021-05-27 PROCEDURE — 80061 LIPID PANEL: CPT | Performed by: FAMILY MEDICINE

## 2021-05-27 PROCEDURE — 80053 COMPREHEN METABOLIC PANEL: CPT | Performed by: FAMILY MEDICINE

## 2021-05-27 PROCEDURE — 99999 PR PBB SHADOW E&M-EST. PATIENT-LVL V: ICD-10-PCS | Mod: PBBFAC,,, | Performed by: FAMILY MEDICINE

## 2021-05-27 PROCEDURE — 99213 PR OFFICE/OUTPT VISIT, EST, LEVL III, 20-29 MIN: ICD-10-PCS | Mod: S$PBB,,, | Performed by: FAMILY MEDICINE

## 2021-05-27 RX ORDER — AMLODIPINE BESYLATE 2.5 MG/1
2.5 TABLET ORAL DAILY
Qty: 90 TABLET | Refills: 4 | Status: SHIPPED | OUTPATIENT
Start: 2021-05-27 | End: 2022-06-07 | Stop reason: SDUPTHER

## 2021-10-17 ENCOUNTER — PATIENT MESSAGE (OUTPATIENT)
Dept: INTERNAL MEDICINE | Facility: CLINIC | Age: 66
End: 2021-10-17
Payer: MEDICARE

## 2021-10-17 DIAGNOSIS — Z12.11 SCREEN FOR COLON CANCER: Primary | ICD-10-CM

## 2021-10-18 ENCOUNTER — PATIENT MESSAGE (OUTPATIENT)
Dept: INTERNAL MEDICINE | Facility: CLINIC | Age: 66
End: 2021-10-18
Payer: MEDICARE

## 2021-10-18 ENCOUNTER — TELEPHONE (OUTPATIENT)
Dept: INTERNAL MEDICINE | Facility: CLINIC | Age: 66
End: 2021-10-18

## 2021-10-18 DIAGNOSIS — Z12.31 ENCOUNTER FOR SCREENING MAMMOGRAM FOR MALIGNANT NEOPLASM OF BREAST: Primary | ICD-10-CM

## 2021-10-20 ENCOUNTER — PATIENT MESSAGE (OUTPATIENT)
Dept: ENDOSCOPY | Facility: HOSPITAL | Age: 66
End: 2021-10-20
Payer: MEDICARE

## 2021-10-20 RX ORDER — SODIUM, POTASSIUM,MAG SULFATES 17.5-3.13G
1 SOLUTION, RECONSTITUTED, ORAL ORAL DAILY
Qty: 1 KIT | Refills: 0 | Status: SHIPPED | OUTPATIENT
Start: 2021-10-20 | End: 2021-10-22

## 2021-10-21 ENCOUNTER — OFFICE VISIT (OUTPATIENT)
Dept: INTERNAL MEDICINE | Facility: CLINIC | Age: 66
End: 2021-10-21
Payer: MEDICARE

## 2021-10-21 VITALS
BODY MASS INDEX: 26.17 KG/M2 | HEIGHT: 63 IN | WEIGHT: 147.69 LBS | OXYGEN SATURATION: 95 % | SYSTOLIC BLOOD PRESSURE: 128 MMHG | DIASTOLIC BLOOD PRESSURE: 72 MMHG | HEART RATE: 83 BPM

## 2021-10-21 DIAGNOSIS — G57.01 PIRIFORMIS SYNDROME OF RIGHT SIDE: Primary | ICD-10-CM

## 2021-10-21 PROCEDURE — 99999 PR PBB SHADOW E&M-EST. PATIENT-LVL IV: ICD-10-PCS | Mod: PBBFAC,,, | Performed by: FAMILY MEDICINE

## 2021-10-21 PROCEDURE — 99214 OFFICE O/P EST MOD 30 MIN: CPT | Mod: PBBFAC,25 | Performed by: FAMILY MEDICINE

## 2021-10-21 PROCEDURE — 99214 PR OFFICE/OUTPT VISIT, EST, LEVL IV, 30-39 MIN: ICD-10-PCS | Mod: S$PBB,,, | Performed by: FAMILY MEDICINE

## 2021-10-21 PROCEDURE — 96372 THER/PROPH/DIAG INJ SC/IM: CPT | Mod: PBBFAC

## 2021-10-21 PROCEDURE — 99214 OFFICE O/P EST MOD 30 MIN: CPT | Mod: S$PBB,,, | Performed by: FAMILY MEDICINE

## 2021-10-21 PROCEDURE — 99999 PR PBB SHADOW E&M-EST. PATIENT-LVL IV: CPT | Mod: PBBFAC,,, | Performed by: FAMILY MEDICINE

## 2021-10-21 RX ORDER — TIZANIDINE 4 MG/1
4 TABLET ORAL 3 TIMES DAILY
Qty: 30 TABLET | Refills: 0 | Status: SHIPPED | OUTPATIENT
Start: 2021-10-21 | End: 2021-11-29

## 2021-10-21 RX ORDER — BETAMETHASONE SODIUM PHOSPHATE AND BETAMETHASONE ACETATE 3; 3 MG/ML; MG/ML
6 INJECTION, SUSPENSION INTRA-ARTICULAR; INTRALESIONAL; INTRAMUSCULAR; SOFT TISSUE
Status: COMPLETED | OUTPATIENT
Start: 2021-10-21 | End: 2021-10-21

## 2021-10-21 RX ADMIN — BETAMETHASONE ACETATE AND BETAMETHASONE SODIUM PHOSPHATE 6 MG: 3; 3 INJECTION, SUSPENSION INTRA-ARTICULAR; INTRALESIONAL; INTRAMUSCULAR; SOFT TISSUE at 10:10

## 2021-11-10 ENCOUNTER — PATIENT MESSAGE (OUTPATIENT)
Dept: ENDOSCOPY | Facility: HOSPITAL | Age: 66
End: 2021-11-10
Payer: MEDICARE

## 2021-11-12 RX ORDER — POLYETHYLENE GLYCOL 3350, SODIUM SULFATE ANHYDROUS, SODIUM BICARBONATE, SODIUM CHLORIDE, POTASSIUM CHLORIDE 236; 22.74; 6.74; 5.86; 2.97 G/4L; G/4L; G/4L; G/4L; G/4L
4 POWDER, FOR SOLUTION ORAL ONCE
Qty: 4000 ML | Refills: 0 | Status: SHIPPED | OUTPATIENT
Start: 2021-11-12 | End: 2021-11-12

## 2021-11-15 ENCOUNTER — ANESTHESIA EVENT (OUTPATIENT)
Dept: ENDOSCOPY | Facility: HOSPITAL | Age: 66
End: 2021-11-15
Payer: MEDICARE

## 2021-11-15 RX ORDER — ASPIRIN 81 MG/1
81 TABLET ORAL DAILY
COMMUNITY

## 2021-11-17 ENCOUNTER — ANESTHESIA (OUTPATIENT)
Dept: ENDOSCOPY | Facility: HOSPITAL | Age: 66
End: 2021-11-17
Payer: MEDICARE

## 2021-11-17 ENCOUNTER — HOSPITAL ENCOUNTER (OUTPATIENT)
Facility: HOSPITAL | Age: 66
Discharge: HOME | End: 2021-11-17
Attending: INTERNAL MEDICINE | Admitting: INTERNAL MEDICINE
Payer: MEDICARE

## 2021-11-17 VITALS
HEART RATE: 60 BPM | WEIGHT: 141.13 LBS | TEMPERATURE: 97 F | RESPIRATION RATE: 16 BRPM | BODY MASS INDEX: 25.01 KG/M2 | SYSTOLIC BLOOD PRESSURE: 139 MMHG | DIASTOLIC BLOOD PRESSURE: 88 MMHG | HEIGHT: 63 IN | OXYGEN SATURATION: 100 %

## 2021-11-17 PROCEDURE — 63600175 PHARM REV CODE 636 W HCPCS: Performed by: NURSE ANESTHETIST, CERTIFIED REGISTERED

## 2021-11-17 PROCEDURE — 63600175 PHARM REV CODE 636 W HCPCS: Performed by: INTERNAL MEDICINE

## 2021-11-17 PROCEDURE — 27201012 HC FORCEPS, HOT/COLD, DISP: Performed by: INTERNAL MEDICINE

## 2021-11-17 PROCEDURE — 27201028 HC NEEDLE, SCLERO: Performed by: INTERNAL MEDICINE

## 2021-11-17 PROCEDURE — D9220A PRA ANESTHESIA: Mod: PT,CRNA,, | Performed by: NURSE ANESTHETIST, CERTIFIED REGISTERED

## 2021-11-17 PROCEDURE — D9220A PRA ANESTHESIA: Mod: PT,ANES,, | Performed by: STUDENT IN AN ORGANIZED HEALTH CARE EDUCATION/TRAINING PROGRAM

## 2021-11-17 PROCEDURE — 88305 TISSUE EXAM BY PATHOLOGIST: ICD-10-PCS | Mod: 26,,, | Performed by: STUDENT IN AN ORGANIZED HEALTH CARE EDUCATION/TRAINING PROGRAM

## 2021-11-17 PROCEDURE — 45380 COLONOSCOPY AND BIOPSY: CPT | Mod: 59,,, | Performed by: INTERNAL MEDICINE

## 2021-11-17 PROCEDURE — 37000008 HC ANESTHESIA 1ST 15 MINUTES: Performed by: INTERNAL MEDICINE

## 2021-11-17 PROCEDURE — 27200997: Performed by: INTERNAL MEDICINE

## 2021-11-17 PROCEDURE — 45385 COLONOSCOPY W/LESION REMOVAL: CPT | Performed by: INTERNAL MEDICINE

## 2021-11-17 PROCEDURE — 45390 COLONOSCOPY W/RESECTION: CPT | Mod: PT,,, | Performed by: INTERNAL MEDICINE

## 2021-11-17 PROCEDURE — D9220A PRA ANESTHESIA: ICD-10-PCS | Mod: PT,ANES,, | Performed by: STUDENT IN AN ORGANIZED HEALTH CARE EDUCATION/TRAINING PROGRAM

## 2021-11-17 PROCEDURE — 45381 COLONOSCOPY SUBMUCOUS NJX: CPT | Performed by: INTERNAL MEDICINE

## 2021-11-17 PROCEDURE — 25000003 PHARM REV CODE 250: Performed by: NURSE ANESTHETIST, CERTIFIED REGISTERED

## 2021-11-17 PROCEDURE — 88305 TISSUE EXAM BY PATHOLOGIST: CPT | Mod: 59 | Performed by: STUDENT IN AN ORGANIZED HEALTH CARE EDUCATION/TRAINING PROGRAM

## 2021-11-17 PROCEDURE — 27201089 HC SNARE, DISP (ANY): Performed by: INTERNAL MEDICINE

## 2021-11-17 PROCEDURE — 88305 TISSUE EXAM BY PATHOLOGIST: CPT | Mod: 26,,, | Performed by: STUDENT IN AN ORGANIZED HEALTH CARE EDUCATION/TRAINING PROGRAM

## 2021-11-17 PROCEDURE — 45390 COLONOSCOPY W/RESECTION: CPT | Performed by: INTERNAL MEDICINE

## 2021-11-17 PROCEDURE — 45384 COLONOSCOPY W/LESION REMOVAL: CPT | Performed by: INTERNAL MEDICINE

## 2021-11-17 PROCEDURE — 45380 PR COLONOSCOPY,BIOPSY: ICD-10-PCS | Mod: 59,,, | Performed by: INTERNAL MEDICINE

## 2021-11-17 PROCEDURE — D9220A PRA ANESTHESIA: ICD-10-PCS | Mod: PT,CRNA,, | Performed by: NURSE ANESTHETIST, CERTIFIED REGISTERED

## 2021-11-17 PROCEDURE — 27202363 HC INJECTION AGENT, SUBMUCOSAL, ANY: Performed by: INTERNAL MEDICINE

## 2021-11-17 PROCEDURE — 45380 COLONOSCOPY AND BIOPSY: CPT | Performed by: INTERNAL MEDICINE

## 2021-11-17 PROCEDURE — 45390: ICD-10-PCS | Mod: PT,,, | Performed by: INTERNAL MEDICINE

## 2021-11-17 PROCEDURE — 37000009 HC ANESTHESIA EA ADD 15 MINS: Performed by: INTERNAL MEDICINE

## 2021-11-17 RX ORDER — PROPOFOL 10 MG/ML
VIAL (ML) INTRAVENOUS
Status: DISCONTINUED | OUTPATIENT
Start: 2021-11-17 | End: 2021-11-17

## 2021-11-17 RX ORDER — LIDOCAINE HYDROCHLORIDE 20 MG/ML
INJECTION, SOLUTION EPIDURAL; INFILTRATION; INTRACAUDAL; PERINEURAL
Status: DISCONTINUED | OUTPATIENT
Start: 2021-11-17 | End: 2021-11-17

## 2021-11-17 RX ORDER — SODIUM CHLORIDE, SODIUM LACTATE, POTASSIUM CHLORIDE, CALCIUM CHLORIDE 600; 310; 30; 20 MG/100ML; MG/100ML; MG/100ML; MG/100ML
INJECTION, SOLUTION INTRAVENOUS CONTINUOUS
Status: DISCONTINUED | OUTPATIENT
Start: 2021-11-17 | End: 2021-11-17 | Stop reason: HOSPADM

## 2021-11-17 RX ADMIN — PROPOFOL 50 MG: 10 INJECTION, EMULSION INTRAVENOUS at 09:11

## 2021-11-17 RX ADMIN — PROPOFOL 10 MG: 10 INJECTION, EMULSION INTRAVENOUS at 10:11

## 2021-11-17 RX ADMIN — PROPOFOL 20 MG: 10 INJECTION, EMULSION INTRAVENOUS at 10:11

## 2021-11-17 RX ADMIN — LIDOCAINE HYDROCHLORIDE 30 MG: 20 INJECTION, SOLUTION EPIDURAL; INFILTRATION; INTRACAUDAL; PERINEURAL at 09:11

## 2021-11-17 RX ADMIN — PROPOFOL 30 MG: 10 INJECTION, EMULSION INTRAVENOUS at 10:11

## 2021-11-17 RX ADMIN — SODIUM CHLORIDE, SODIUM LACTATE, POTASSIUM CHLORIDE, AND CALCIUM CHLORIDE: 600; 310; 30; 20 INJECTION, SOLUTION INTRAVENOUS at 09:11

## 2021-11-17 RX ADMIN — PROPOFOL 20 MG: 10 INJECTION, EMULSION INTRAVENOUS at 09:11

## 2021-11-28 LAB
COMMENT: NORMAL
FINAL PATHOLOGIC DIAGNOSIS: NORMAL
GROSS: NORMAL
Lab: NORMAL

## 2021-11-29 ENCOUNTER — OFFICE VISIT (OUTPATIENT)
Dept: OBSTETRICS AND GYNECOLOGY | Facility: CLINIC | Age: 66
End: 2021-11-29
Payer: MEDICARE

## 2021-11-29 VITALS
HEIGHT: 63 IN | SYSTOLIC BLOOD PRESSURE: 132 MMHG | DIASTOLIC BLOOD PRESSURE: 80 MMHG | BODY MASS INDEX: 25.75 KG/M2 | WEIGHT: 145.31 LBS

## 2021-11-29 DIAGNOSIS — N76.0 ACUTE VAGINITIS: Primary | ICD-10-CM

## 2021-11-29 DIAGNOSIS — R10.9 ABDOMINAL PAIN, UNSPECIFIED ABDOMINAL LOCATION: ICD-10-CM

## 2021-11-29 DIAGNOSIS — R82.90 MALODOROUS URINE: ICD-10-CM

## 2021-11-29 LAB
BILIRUB SERPL-MCNC: NEGATIVE MG/DL
BLOOD URINE, POC: NEGATIVE
CLARITY, POC UA: NORMAL
COLOR, POC UA: NORMAL
GARDNERELLA VAGINALIS: NORMAL
GLUCOSE UR QL STRIP: NORMAL
KETONES UR QL STRIP: NEGATIVE
LEUKOCYTE ESTERASE URINE, POC: NEGATIVE
NITRITE, POC UA: NEGATIVE
OTHER MICROSC. OBSERVATIONS: NORMAL
PH, POC UA: 9
POC BACTERIAL VAGINOSIS: NORMAL
POC CLUE CELLS: NEGATIVE
PROTEIN, POC: NEGATIVE
SPECIFIC GRAVITY, POC UA: 1
TRICHOMONAS, POC: NEGATIVE
UROBILINOGEN, POC UA: NORMAL
YEAST WET PREP: NEGATIVE

## 2021-11-29 PROCEDURE — 87210 SMEAR WET MOUNT SALINE/INK: CPT | Mod: PBBFAC,PN | Performed by: NURSE PRACTITIONER

## 2021-11-29 PROCEDURE — 81002 URINALYSIS NONAUTO W/O SCOPE: CPT | Mod: PBBFAC,PN | Performed by: NURSE PRACTITIONER

## 2021-11-29 PROCEDURE — 99213 PR OFFICE/OUTPT VISIT, EST, LEVL III, 20-29 MIN: ICD-10-PCS | Mod: S$PBB,,, | Performed by: NURSE PRACTITIONER

## 2021-11-29 PROCEDURE — 99213 OFFICE O/P EST LOW 20 MIN: CPT | Mod: S$PBB,,, | Performed by: NURSE PRACTITIONER

## 2021-11-29 PROCEDURE — 99999 PR PBB SHADOW E&M-EST. PATIENT-LVL III: CPT | Mod: PBBFAC,,, | Performed by: NURSE PRACTITIONER

## 2021-11-29 PROCEDURE — 99999 PR PBB SHADOW E&M-EST. PATIENT-LVL III: ICD-10-PCS | Mod: PBBFAC,,, | Performed by: NURSE PRACTITIONER

## 2021-11-29 PROCEDURE — 87086 URINE CULTURE/COLONY COUNT: CPT | Performed by: NURSE PRACTITIONER

## 2021-11-29 PROCEDURE — 99213 OFFICE O/P EST LOW 20 MIN: CPT | Mod: PBBFAC,PN | Performed by: NURSE PRACTITIONER

## 2021-11-29 RX ORDER — POLYETHYLENE GLYCOL-3350 AND ELECTROLYTES 236; 6.74; 5.86; 2.97; 22.74 G/274.31G; G/274.31G; G/274.31G; G/274.31G; G/274.31G
POWDER, FOR SOLUTION ORAL
COMMUNITY
Start: 2021-11-12 | End: 2021-11-29

## 2021-11-30 DIAGNOSIS — Z12.11 COLON CANCER SCREENING: Primary | ICD-10-CM

## 2021-12-01 ENCOUNTER — PATIENT MESSAGE (OUTPATIENT)
Dept: GASTROENTEROLOGY | Facility: CLINIC | Age: 66
End: 2021-12-01
Payer: MEDICARE

## 2021-12-02 ENCOUNTER — OFFICE VISIT (OUTPATIENT)
Dept: GASTROENTEROLOGY | Facility: CLINIC | Age: 66
End: 2021-12-02
Payer: MEDICARE

## 2021-12-02 VITALS
BODY MASS INDEX: 25.78 KG/M2 | SYSTOLIC BLOOD PRESSURE: 130 MMHG | WEIGHT: 145.5 LBS | TEMPERATURE: 98 F | OXYGEN SATURATION: 99 % | HEART RATE: 62 BPM | DIASTOLIC BLOOD PRESSURE: 88 MMHG | HEIGHT: 63 IN

## 2021-12-02 DIAGNOSIS — R10.84 GENERALIZED ABDOMINAL PAIN: ICD-10-CM

## 2021-12-02 DIAGNOSIS — D12.6 COLON ADENOMA: Primary | ICD-10-CM

## 2021-12-02 LAB — BACTERIA UR CULT: NO GROWTH

## 2021-12-02 PROCEDURE — 99214 PR OFFICE/OUTPT VISIT, EST, LEVL IV, 30-39 MIN: ICD-10-PCS | Mod: S$PBB,,, | Performed by: INTERNAL MEDICINE

## 2021-12-02 PROCEDURE — 99999 PR PBB SHADOW E&M-EST. PATIENT-LVL IV: ICD-10-PCS | Mod: PBBFAC,,, | Performed by: INTERNAL MEDICINE

## 2021-12-02 PROCEDURE — 99214 OFFICE O/P EST MOD 30 MIN: CPT | Mod: S$PBB,,, | Performed by: INTERNAL MEDICINE

## 2021-12-02 PROCEDURE — 99999 PR PBB SHADOW E&M-EST. PATIENT-LVL IV: CPT | Mod: PBBFAC,,, | Performed by: INTERNAL MEDICINE

## 2021-12-02 PROCEDURE — 99214 OFFICE O/P EST MOD 30 MIN: CPT | Mod: PBBFAC | Performed by: INTERNAL MEDICINE

## 2021-12-02 RX ORDER — HYOSCYAMINE SULFATE 0.12 MG/1
0.12 TABLET SUBLINGUAL EVERY 4 HOURS PRN
Qty: 60 TABLET | Refills: 0 | Status: SHIPPED | OUTPATIENT
Start: 2021-12-02 | End: 2022-05-25

## 2021-12-02 RX ORDER — PANTOPRAZOLE SODIUM 40 MG/1
40 TABLET, DELAYED RELEASE ORAL DAILY
Qty: 60 TABLET | Refills: 0 | Status: SHIPPED | OUTPATIENT
Start: 2021-12-02 | End: 2022-01-29

## 2021-12-03 ENCOUNTER — LAB VISIT (OUTPATIENT)
Dept: LAB | Facility: HOSPITAL | Age: 66
End: 2021-12-03
Attending: FAMILY MEDICINE
Payer: MEDICARE

## 2021-12-03 DIAGNOSIS — I10 HTN (HYPERTENSION), BENIGN: ICD-10-CM

## 2021-12-03 DIAGNOSIS — R10.84 GENERALIZED ABDOMINAL PAIN: ICD-10-CM

## 2021-12-03 LAB
ALBUMIN SERPL BCP-MCNC: 4.1 G/DL (ref 3.5–5.2)
ALBUMIN SERPL BCP-MCNC: 4.1 G/DL (ref 3.5–5.2)
ALP SERPL-CCNC: 75 U/L (ref 55–135)
ALT SERPL W/O P-5'-P-CCNC: 22 U/L (ref 10–44)
ANION GAP SERPL CALC-SCNC: 9 MMOL/L (ref 8–16)
ANION GAP SERPL CALC-SCNC: 9 MMOL/L (ref 8–16)
AST SERPL-CCNC: 19 U/L (ref 10–40)
BASOPHILS # BLD AUTO: 0.01 K/UL (ref 0–0.2)
BASOPHILS NFR BLD: 0.2 % (ref 0–1.9)
BILIRUB SERPL-MCNC: 0.5 MG/DL (ref 0.1–1)
BUN SERPL-MCNC: 19 MG/DL (ref 8–23)
BUN SERPL-MCNC: 19 MG/DL (ref 8–23)
CALCIUM SERPL-MCNC: 9.6 MG/DL (ref 8.7–10.5)
CALCIUM SERPL-MCNC: 9.6 MG/DL (ref 8.7–10.5)
CHLORIDE SERPL-SCNC: 107 MMOL/L (ref 95–110)
CHLORIDE SERPL-SCNC: 107 MMOL/L (ref 95–110)
CO2 SERPL-SCNC: 25 MMOL/L (ref 23–29)
CO2 SERPL-SCNC: 25 MMOL/L (ref 23–29)
CREAT SERPL-MCNC: 1 MG/DL (ref 0.5–1.4)
CREAT SERPL-MCNC: 1 MG/DL (ref 0.5–1.4)
DIFFERENTIAL METHOD: ABNORMAL
EOSINOPHIL # BLD AUTO: 0.2 K/UL (ref 0–0.5)
EOSINOPHIL NFR BLD: 2.6 % (ref 0–8)
ERYTHROCYTE [DISTWIDTH] IN BLOOD BY AUTOMATED COUNT: 13.1 % (ref 11.5–14.5)
EST. GFR  (AFRICAN AMERICAN): >60 ML/MIN/1.73 M^2
EST. GFR  (AFRICAN AMERICAN): >60 ML/MIN/1.73 M^2
EST. GFR  (NON AFRICAN AMERICAN): 58 ML/MIN/1.73 M^2
EST. GFR  (NON AFRICAN AMERICAN): 58 ML/MIN/1.73 M^2
GLUCOSE SERPL-MCNC: 97 MG/DL (ref 70–110)
GLUCOSE SERPL-MCNC: 97 MG/DL (ref 70–110)
HCT VFR BLD AUTO: 38.7 % (ref 37–48.5)
HGB BLD-MCNC: 12.2 G/DL (ref 12–16)
IMM GRANULOCYTES # BLD AUTO: 0.02 K/UL (ref 0–0.04)
IMM GRANULOCYTES NFR BLD AUTO: 0.3 % (ref 0–0.5)
LYMPHOCYTES # BLD AUTO: 2.7 K/UL (ref 1–4.8)
LYMPHOCYTES NFR BLD: 39.9 % (ref 18–48)
MCH RBC QN AUTO: 29.5 PG (ref 27–31)
MCHC RBC AUTO-ENTMCNC: 31.5 G/DL (ref 32–36)
MCV RBC AUTO: 94 FL (ref 82–98)
MONOCYTES # BLD AUTO: 0.4 K/UL (ref 0.3–1)
MONOCYTES NFR BLD: 6.5 % (ref 4–15)
NEUTROPHILS # BLD AUTO: 3.4 K/UL (ref 1.8–7.7)
NEUTROPHILS NFR BLD: 50.5 % (ref 38–73)
NRBC BLD-RTO: 0 /100 WBC
PHOSPHATE SERPL-MCNC: 3.7 MG/DL (ref 2.7–4.5)
PLATELET # BLD AUTO: 262 K/UL (ref 150–450)
PMV BLD AUTO: 10.3 FL (ref 9.2–12.9)
POTASSIUM SERPL-SCNC: 4.3 MMOL/L (ref 3.5–5.1)
POTASSIUM SERPL-SCNC: 4.3 MMOL/L (ref 3.5–5.1)
PROT SERPL-MCNC: 6.7 G/DL (ref 6–8.4)
RBC # BLD AUTO: 4.14 M/UL (ref 4–5.4)
SODIUM SERPL-SCNC: 141 MMOL/L (ref 136–145)
SODIUM SERPL-SCNC: 141 MMOL/L (ref 136–145)
WBC # BLD AUTO: 6.64 K/UL (ref 3.9–12.7)

## 2021-12-03 PROCEDURE — 80069 RENAL FUNCTION PANEL: CPT | Performed by: INTERNAL MEDICINE

## 2021-12-03 PROCEDURE — 80053 COMPREHEN METABOLIC PANEL: CPT | Performed by: INTERNAL MEDICINE

## 2021-12-03 PROCEDURE — 85025 COMPLETE CBC W/AUTO DIFF WBC: CPT | Performed by: INTERNAL MEDICINE

## 2021-12-03 PROCEDURE — 36415 COLL VENOUS BLD VENIPUNCTURE: CPT | Performed by: INTERNAL MEDICINE

## 2021-12-08 ENCOUNTER — PATIENT MESSAGE (OUTPATIENT)
Dept: OBSTETRICS AND GYNECOLOGY | Facility: CLINIC | Age: 66
End: 2021-12-08
Payer: MEDICARE

## 2021-12-08 DIAGNOSIS — N95.2 VAGINAL ATROPHY: Primary | ICD-10-CM

## 2021-12-08 RX ORDER — ESTRADIOL 0.1 MG/G
CREAM VAGINAL
Qty: 42.5 G | Refills: 2 | Status: SHIPPED | OUTPATIENT
Start: 2021-12-08 | End: 2022-05-25

## 2021-12-10 ENCOUNTER — TELEPHONE (OUTPATIENT)
Dept: RADIOLOGY | Facility: HOSPITAL | Age: 66
End: 2021-12-10
Payer: MEDICARE

## 2021-12-13 ENCOUNTER — HOSPITAL ENCOUNTER (OUTPATIENT)
Dept: RADIOLOGY | Facility: HOSPITAL | Age: 66
Discharge: HOME | End: 2021-12-13
Attending: FAMILY MEDICINE
Payer: MEDICARE

## 2021-12-13 ENCOUNTER — HOSPITAL ENCOUNTER (OUTPATIENT)
Dept: RADIOLOGY | Facility: HOSPITAL | Age: 66
Discharge: HOME | End: 2021-12-13
Attending: INTERNAL MEDICINE
Payer: MEDICARE

## 2021-12-13 DIAGNOSIS — R10.84 GENERALIZED ABDOMINAL PAIN: ICD-10-CM

## 2021-12-13 DIAGNOSIS — Z12.31 ENCOUNTER FOR SCREENING MAMMOGRAM FOR MALIGNANT NEOPLASM OF BREAST: ICD-10-CM

## 2021-12-13 PROCEDURE — 77067 SCR MAMMO BI INCL CAD: CPT | Mod: 26,,, | Performed by: RADIOLOGY

## 2021-12-13 PROCEDURE — 77067 SCR MAMMO BI INCL CAD: CPT | Mod: TC

## 2021-12-13 PROCEDURE — 77063 BREAST TOMOSYNTHESIS BI: CPT | Mod: 26,,, | Performed by: RADIOLOGY

## 2021-12-13 PROCEDURE — 77063 MAMMO DIGITAL SCREENING BILAT WITH TOMO: ICD-10-PCS | Mod: 26,,, | Performed by: RADIOLOGY

## 2021-12-13 PROCEDURE — 25500020 PHARM REV CODE 255: Performed by: INTERNAL MEDICINE

## 2021-12-13 PROCEDURE — A9698 NON-RAD CONTRAST MATERIALNOC: HCPCS | Performed by: INTERNAL MEDICINE

## 2021-12-13 PROCEDURE — 74177 CT ABD & PELVIS W/CONTRAST: CPT | Mod: TC

## 2021-12-13 PROCEDURE — 77067 MAMMO DIGITAL SCREENING BILAT WITH TOMO: ICD-10-PCS | Mod: 26,,, | Performed by: RADIOLOGY

## 2021-12-13 PROCEDURE — 74177 CT ABD & PELVIS W/CONTRAST: CPT | Mod: 26,,, | Performed by: RADIOLOGY

## 2021-12-13 PROCEDURE — 74177 CT ABDOMEN PELVIS WITH CONTRAST: ICD-10-PCS | Mod: 26,,, | Performed by: RADIOLOGY

## 2021-12-13 RX ADMIN — IOHEXOL 75 ML: 350 INJECTION, SOLUTION INTRAVENOUS at 02:12

## 2021-12-13 RX ADMIN — IOHEXOL 1000 ML: 12 SOLUTION ORAL at 01:12

## 2021-12-14 ENCOUNTER — TELEPHONE (OUTPATIENT)
Dept: HEPATOLOGY | Facility: CLINIC | Age: 66
End: 2021-12-14
Payer: MEDICARE

## 2022-01-21 ENCOUNTER — PATIENT MESSAGE (OUTPATIENT)
Dept: INTERNAL MEDICINE | Facility: CLINIC | Age: 67
End: 2022-01-21
Payer: MEDICARE

## 2022-01-24 DIAGNOSIS — I10 HTN (HYPERTENSION), BENIGN: ICD-10-CM

## 2022-01-24 RX ORDER — METOPROLOL SUCCINATE 100 MG/1
100 TABLET, EXTENDED RELEASE ORAL DAILY
Qty: 30 TABLET | Refills: 0 | Status: SHIPPED | OUTPATIENT
Start: 2022-01-24 | End: 2022-02-08 | Stop reason: SDUPTHER

## 2022-01-24 NOTE — TELEPHONE ENCOUNTER
Pt is requesting refill on metoprolol to be sent to [facility]. Original prescription written by Dr. Charito MD. Advised pt to reach out to nephrology department to inquire which provider will be providing continuity of care. Please advise.//ddw

## 2022-01-24 NOTE — TELEPHONE ENCOUNTER
No new care gaps identified.  Powered by [employer]. Reference number: [id number].   1/24/2022 7:33:16 AM CST

## 2022-02-02 ENCOUNTER — PATIENT MESSAGE (OUTPATIENT)
Dept: ENDOSCOPY | Facility: HOSPITAL | Age: 67
End: 2022-02-02
Payer: MEDICARE

## 2022-02-02 DIAGNOSIS — I10 HTN (HYPERTENSION), BENIGN: Primary | ICD-10-CM

## 2022-02-11 ENCOUNTER — PATIENT MESSAGE (OUTPATIENT)
Dept: INTERNAL MEDICINE | Facility: CLINIC | Age: 67
End: 2022-02-11
Payer: MEDICARE

## 2022-02-11 ENCOUNTER — PATIENT MESSAGE (OUTPATIENT)
Dept: NEPHROLOGY | Facility: CLINIC | Age: 67
End: 2022-02-11
Payer: MEDICARE

## 2022-02-11 DIAGNOSIS — I10 HTN (HYPERTENSION), BENIGN: ICD-10-CM

## 2022-02-11 RX ORDER — AMILORIDE HYDROCHLORIDE 5 MG/1
10 TABLET ORAL DAILY
Qty: 60 TABLET | Refills: 11 | Status: SHIPPED | OUTPATIENT
Start: 2022-02-11 | End: 2022-02-11 | Stop reason: SDUPTHER

## 2022-02-15 ENCOUNTER — PATIENT MESSAGE (OUTPATIENT)
Dept: ENDOSCOPY | Facility: HOSPITAL | Age: 67
End: 2022-02-15
Payer: MEDICARE

## 2022-02-18 ENCOUNTER — ANESTHESIA (OUTPATIENT)
Dept: ENDOSCOPY | Facility: HOSPITAL | Age: 67
End: 2022-02-18
Payer: MEDICARE

## 2022-02-18 ENCOUNTER — ANESTHESIA EVENT (OUTPATIENT)
Dept: ENDOSCOPY | Facility: HOSPITAL | Age: 67
End: 2022-02-18
Payer: MEDICARE

## 2022-02-18 ENCOUNTER — HOSPITAL ENCOUNTER (OUTPATIENT)
Facility: HOSPITAL | Age: 67
Discharge: HOME | End: 2022-02-18
Attending: INTERNAL MEDICINE | Admitting: INTERNAL MEDICINE
Payer: MEDICARE

## 2022-02-18 VITALS
RESPIRATION RATE: 15 BRPM | DIASTOLIC BLOOD PRESSURE: 74 MMHG | OXYGEN SATURATION: 100 % | TEMPERATURE: 98 F | HEART RATE: 53 BPM | SYSTOLIC BLOOD PRESSURE: 131 MMHG

## 2022-02-18 DIAGNOSIS — D12.6 COLON ADENOMA: Primary | ICD-10-CM

## 2022-02-18 LAB
CTP QC/QA: YES
SARS-COV-2 AG RESP QL IA.RAPID: NEGATIVE

## 2022-02-18 PROCEDURE — 37000008 HC ANESTHESIA 1ST 15 MINUTES: Performed by: INTERNAL MEDICINE

## 2022-02-18 PROCEDURE — 88305 TISSUE EXAM BY PATHOLOGIST: ICD-10-PCS | Mod: 26,,, | Performed by: PATHOLOGY

## 2022-02-18 PROCEDURE — 37000009 HC ANESTHESIA EA ADD 15 MINS: Performed by: INTERNAL MEDICINE

## 2022-02-18 PROCEDURE — 25000003 PHARM REV CODE 250: Performed by: NURSE ANESTHETIST, CERTIFIED REGISTERED

## 2022-02-18 PROCEDURE — 45390 COLONOSCOPY W/RESECTION: CPT | Mod: PT,,, | Performed by: INTERNAL MEDICINE

## 2022-02-18 PROCEDURE — 45390: ICD-10-PCS | Mod: PT,,, | Performed by: INTERNAL MEDICINE

## 2022-02-18 PROCEDURE — 63600175 PHARM REV CODE 636 W HCPCS: Performed by: NURSE ANESTHETIST, CERTIFIED REGISTERED

## 2022-02-18 PROCEDURE — 27201089 HC SNARE, DISP (ANY): Performed by: INTERNAL MEDICINE

## 2022-02-18 PROCEDURE — 88305 TISSUE EXAM BY PATHOLOGIST: CPT | Performed by: PATHOLOGY

## 2022-02-18 PROCEDURE — 27202087 HC PROBE, APC: Performed by: INTERNAL MEDICINE

## 2022-02-18 PROCEDURE — 27201028 HC NEEDLE, SCLERO: Performed by: INTERNAL MEDICINE

## 2022-02-18 PROCEDURE — 27202363 HC INJECTION AGENT, SUBMUCOSAL, ANY: Performed by: INTERNAL MEDICINE

## 2022-02-18 PROCEDURE — 88305 TISSUE EXAM BY PATHOLOGIST: CPT | Mod: 26,,, | Performed by: PATHOLOGY

## 2022-02-18 PROCEDURE — 45390 COLONOSCOPY W/RESECTION: CPT | Performed by: INTERNAL MEDICINE

## 2022-02-18 RX ORDER — SODIUM CHLORIDE 9 MG/ML
INJECTION, SOLUTION INTRAVENOUS CONTINUOUS
Status: DISCONTINUED | OUTPATIENT
Start: 2022-02-18 | End: 2022-02-18 | Stop reason: HOSPADM

## 2022-02-18 RX ORDER — PROPOFOL 10 MG/ML
VIAL (ML) INTRAVENOUS
Status: DISCONTINUED | OUTPATIENT
Start: 2022-02-18 | End: 2022-02-18

## 2022-02-18 RX ORDER — SODIUM CHLORIDE, SODIUM LACTATE, POTASSIUM CHLORIDE, CALCIUM CHLORIDE 600; 310; 30; 20 MG/100ML; MG/100ML; MG/100ML; MG/100ML
INJECTION, SOLUTION INTRAVENOUS CONTINUOUS PRN
Status: DISCONTINUED | OUTPATIENT
Start: 2022-02-18 | End: 2022-02-18

## 2022-02-18 RX ORDER — LIDOCAINE HYDROCHLORIDE 10 MG/ML
INJECTION, SOLUTION EPIDURAL; INFILTRATION; INTRACAUDAL; PERINEURAL
Status: DISCONTINUED | OUTPATIENT
Start: 2022-02-18 | End: 2022-02-18

## 2022-02-18 RX ADMIN — SODIUM CHLORIDE, SODIUM LACTATE, POTASSIUM CHLORIDE, AND CALCIUM CHLORIDE: .6; .31; .03; .02 INJECTION, SOLUTION INTRAVENOUS at 10:02

## 2022-02-18 RX ADMIN — PROPOFOL 20 MG: 10 INJECTION, EMULSION INTRAVENOUS at 11:02

## 2022-02-18 RX ADMIN — PROPOFOL 20 MG: 10 INJECTION, EMULSION INTRAVENOUS at 10:02

## 2022-02-18 RX ADMIN — PROPOFOL 100 MG: 10 INJECTION, EMULSION INTRAVENOUS at 10:02

## 2022-02-18 RX ADMIN — LIDOCAINE HYDROCHLORIDE 50 MG: 10 INJECTION, SOLUTION EPIDURAL; INFILTRATION; INTRACAUDAL; PERINEURAL at 10:02

## 2022-02-18 NOTE — H&P
PRE PROCEDURE H&P    Patient Name: Kacy Blackwell  MRN: [id number]  : 1955  Date of Procedure:  2022  Referring Physician: Kalani Quiroz *  Primary Physician: Gurinder Pierce MD  Procedure Physician: Kalani Quiroz MD       Planned Procedure: Colonoscopy  Diagnosis: post EMR assessment  Chief Complaint: Same as above    HPI: Patient is an 66 y.o. female is here for the above.   Risks and benefits of the procedure were discussed including the risks of bleeding, perforation, requiring surgery, risks related to anesthesia. The patient expressed understanding and agreed to proceed. No language barriers were present.       Past Medical History:   Past Medical History:   Diagnosis Date    Anticoagulant long-term use     aspirin 81mg    Chronic cough     controlled w flonase/zyrtec    Encounter for blood transfusion     Hyperaldosteronism     dr cummings/marcello    Hyperlipidemia     Hypertension     Hypokalemia     Osteopenia     Seasonal allergies         Past Surgical History:  Past Surgical History:   Procedure Laterality Date    COLONOSCOPY N/A 2021    Procedure: COLONOSCOPY;  Surgeon: Kalani Quiroz MD;  [facility];  Service: Endoscopy;  Laterality: N/A;    cyst removed from head      HYSTERECTOMY      cervix absent        Home Medications:  Prior to Admission medications    Medication Sig Start Date End Date Taking? Authorizing Provider   aMILoride (MIDAMOR) 5 MG Tab Take 2 tablets (10 mg total) by mouth once daily. 22 Yes Malgorzata Skaggs MD   amLODIPine (NORVASC) 2.5 MG tablet Take 1 tablet (2.5 mg total) by mouth once daily. 21 Yes Gurinder Pierce MD   aspirin (ECOTRIN) 81 MG EC tablet Take 81 mg by mouth once daily.   Yes Historical Provider   BIOTIN ORAL Take 10,000 mg by mouth once daily.    Yes Historical Provider   calcium carbonate-vit D3-min 600 mg calcium- 400 unit Tab Take 1 tablet by mouth once daily.   Yes  Historical Provider   KLOR-CON M20 20 mEq tablet TAKE ONE TABLET BY MOUTH TWICE A DAY 6/17/21  Yes Gurinder Pierce MD   metoprolol succinate (TOPROL-XL) 100 MG 24 hr tablet TAKE ONE TABLET BY MOUTH EVERY DAY 2/10/22  Yes Gurinder Pierce MD   multivitamin capsule Take 1 capsule by mouth once daily.   Yes Historical Provider   rosuvastatin (CRESTOR) 5 MG tablet Take 1 tablet (5 mg total) by mouth nightly. 4/7/21  Yes Gurinder Pierce MD   zolpidem (AMBIEN) 5 MG Tab Take 1 tablet (5 mg total) by mouth nightly as needed. 7/14/21  Yes Gurinder Pierce MD   cetirizine (ZYRTEC) 10 MG tablet Take 10 mg by mouth once daily.    Historical Provider   dextromethorphan-guaifenesin  mg (MUCINEX DM)  mg per 12 hr tablet Take 1 tablet by mouth once daily.    Historical Provider   estradioL (ESTRACE) 0.01 % (0.1 mg/gram) vaginal cream Place 1 g vaginally once daily for 14 days, THEN 1 g twice a week. 12/8/21 12/8/22  Cindy Wood NP   hydrOXYzine HCL (ATARAX) 25 MG tablet Take 1 tablet (25 mg total) by mouth nightly as needed for Itching (overnight skin sensation). 3/9/21   Gurinder Pierce MD   hyoscyamine (LEVSIN/SL) 0.125 mg Subl Place 1 tablet (0.125 mg total) under the tongue every 4 (four) hours as needed (cramping). 12/2/21 1/31/22  Kalani Quiroz MD   pantoprazole (PROTONIX) 40 MG tablet TAKE 1 TABLET BY MOUTH ONCE DAILY 1/29/22   Kalani Quiroz MD        Allergies:  Review of patient's allergies indicates:   Allergen Reactions    Diovan [valsartan]      angioedema        Social History:   Social History     Socioeconomic History    Marital status:    Tobacco Use    Smoking status: Never Smoker    Smokeless tobacco: Never Used   Substance and Sexual Activity    Alcohol use: Yes     Comment: rare social drinker    Drug use: No    Sexual activity: Not Currently     Partners: Male   Social History Narrative    . 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 Interior design via college training       Family History:  Family History   Problem Relation Age of Onset    Hypertension Mother     Arthritis Mother     Diabetes Mother     Heart failure Mother     Hypertension Father     Arthritis Father     Cancer Sister     Hypertension Brother     Diabetes Brother     Stroke Brother     Hypertension Sister     Breast cancer Other     Colon cancer Neg Hx     Ovarian cancer Neg Hx        ROS: No acute cardiac events, no acute respiratory complaints.     Physical Exam (all patients):    BP (!) 140/74 (BP Location: Left arm, Patient Position: Sitting)   Pulse 69   Resp 19   LMP  (LMP Unknown)   SpO2 97%   Breastfeeding No   Lungs: Clear to auscultation bilaterally, respirations unlabored  Heart: Regular rate and rhythm, S1 and S2 normal, no obvious murmurs  Abdomen:         Soft, non-tender, bowel sounds normal, no masses, no organomegaly    Lab Results   Component Value Date    WBC 6.64 12/03/2021    MCV 94 12/03/2021    RDW 13.1 12/03/2021     12/03/2021    GLU 97 12/03/2021    GLU 97 12/03/2021    HGBA1C 5.7 05/03/2013    BUN 19 12/03/2021    BUN 19 12/03/2021     12/03/2021     12/03/2021    K 4.3 12/03/2021    K 4.3 12/03/2021     12/03/2021     12/03/2021        SEDATION PLAN: per anesthesia      History reviewed, vital signs satisfactory, cardiopulmonary status satisfactory, sedation options, risks and plans have been discussed with the patient  All their questions were answered and the patient agrees to the sedation procedures as planned and the patient is deemed an appropriate candidate for the sedation as planned.    Procedure explained to patient, informed consent obtained and placed in chart.    Kalani Quiroz  2/18/2022  10:52 AM

## 2022-02-18 NOTE — PROVATION PATIENT INSTRUCTIONS
Discharge Summary/Instructions after an Endoscopic Procedure  Patient Name: Kacy Blackwell  Patient MRN: [id number]  Patient YOB: 1955  Friday, February 18, 2022 Kalani Quiroz MD  Dear patient,  As a result of recent federal legislation (The Federal Cures Act), you may   receive lab or pathology results from your procedure in your [facility]   account before your physician is able to contact you. Your physician or   their representative will relay the results to you with their   recommendations at their soonest availability.  Thank you,  RESTRICTIONS:  During your procedure today, you received medications for sedation.  These   medications may affect your judgment, balance and coordination.  Therefore,   for 24 hours, you have the following restrictions:   - DO NOT drive a car, operate machinery, make legal/financial decisions,   sign important papers or drink alcohol.    ACTIVITY:  Today: no heavy lifting, straining or running due to procedural   sedation/anesthesia.  The following day: return to full activity including work.  DIET:  Eat and drink normally unless instructed otherwise.     TREATMENT FOR COMMON SIDE EFFECTS:  - Mild abdominal pain, nausea, belching, bloating or excessive gas:  rest,   eat lightly and use a heating pad.  - Sore Throat: treat with throat lozenges and/or gargle with warm salt   water.  - Because air was used during the procedure, expelling large amounts of air   from your rectum or belching is normal.  - If a bowel prep was taken, you may not have a bowel movement for 1-3 days.    This is normal.  SYMPTOMS TO WATCH FOR AND REPORT TO YOUR PHYSICIAN:  1. Abdominal pain or bloating, other than gas cramps.  2. Chest pain.  3. Back pain.  4. Signs of infection such as: chills or fever occurring within 24 hours   after the procedure.  5. Rectal bleeding, which would show as bright red, maroon, or black stools.   (A tablespoon of blood from the rectum is not serious,  especially if   hemorrhoids are present.)  6. Vomiting.  7. Weakness or dizziness.  GO DIRECTLY TO THE NEAREST EMERGENCY ROOM IF YOU HAVE ANY OF THE FOLLOWING:      Difficulty breathing              Chills and/or fever over 101 F   Persistent vomiting and/or vomiting blood   Severe abdominal pain   Severe chest pain   Black, tarry stools   Bleeding- more than one tablespoon   Any other symptom or condition that you feel may need urgent attention  Your doctor recommends these additional instructions:  If any biopsies were taken, your doctors clinic will contact you in 1 to 2   weeks with any results.  - Discharge patient to home.   - Resume previous diet.   - Continue present medications.   - Await pathology results.   - Repeat colonoscopy in 3 years for surveillance based on pathology results.   IF resected tissue showed any adenoma then again in 6 months.  For questions, problems or results please call your physician Kalani Quiroz MD at Work:  [phone]  If you have any questions about the above instructions, call the GI   department at [phone] or call the endoscopy unit at [phone]   from 7am until 3 pm.  [facility], EMERGENCY ROOM PHONE NUMBER:   [phone]  IF A COMPLICATION OR EMERGENCY SITUATION ARISES AND YOU ARE UNABLE TO REACH   YOUR PHYSICIAN - GO DIRECTLY TO THE EMERGENCY ROOM.  I have read or have had read to me these discharge instructions for my   procedure and have received a written copy.  I understand these   instructions and will follow-up with my physician if I have any questions.     __________________________________       _____________________________________  Nurse Signature                                          Patient/Designated   Responsible Party Signature  MD Kalani Buchanan MD  2/18/2022 12:03:33 PM  This report has been verified and signed electronically.  Dear patient,  As a result of  recent federal legislation (The Federal Cures Act), you may   receive lab or pathology results from your procedure in your [facility]   account before your physician is able to contact you. Your physician or   their representative will relay the results to you with their   recommendations at their soonest availability.  Thank you,  [employer]ATION

## 2022-02-18 NOTE — ANESTHESIA POSTPROCEDURE EVALUATION
Anesthesia Post Evaluation    Patient: Kacy Blackwell    Procedure(s) Performed: Procedure(s) (LRB):  COLONOSCOPY (N/A)    Final Anesthesia Type: MAC      Patient location during evaluation: PACU  Patient participation: No - Unable to Participate, Sedation  Level of consciousness: sedated  Post-procedure vital signs: reviewed and stable  Pain management: adequate  Airway patency: patent    PONV status at discharge: No PONV  Anesthetic complications: no      Cardiovascular status: blood pressure returned to baseline and hemodynamically stable  Respiratory status: unassisted and spontaneous ventilation  Hydration status: euvolemic  Follow-up not needed.              No case tracking events are documented in the log.      Pain/Wilda Score: No data recorded

## 2022-02-18 NOTE — TRANSFER OF CARE
Anesthesia Transfer of Care Note    Patient: Kacy Blackwell    Procedure(s) Performed: Procedure(s) (LRB):  COLONOSCOPY (N/A)    Patient location: PACU    Anesthesia Type: MAC    Transport from OR: Transported from OR on room air with adequate spontaneous ventilation    Post pain: adequate analgesia    Post assessment: no apparent anesthetic complications and tolerated procedure well    Post vital signs: stable    Level of consciousness: sedated    Nausea/Vomiting: no nausea/vomiting    Complications: none    Transfer of care protocol was followed      Last vitals:   Visit Vitals  BP (!) 140/74 (BP Location: Left arm, Patient Position: Sitting)   Pulse 69   Resp 19   LMP  (LMP Unknown)   SpO2 97%   Breastfeeding No

## 2022-02-18 NOTE — ANESTHESIA PREPROCEDURE EVALUATION
02/18/2022  Kacy Blackwell is a 66 y.o., female.    Anesthesia Evaluation    I have reviewed the Patient Summary Reports.    I have reviewed the Nursing Notes. I have reviewed the NPO Status.   I have reviewed the Medications.     Review of Systems  Anesthesia Hx:  No problems with previous Anesthesia  Denies Family Hx of Anesthesia complications.   Denies Personal Hx of Anesthesia complications.   Social:  Alcohol Use, Non-Smoker    Hematology/Oncology:  Hematology Normal   Oncology Normal     Cardiovascular:   Hypertension Denies MI.   Denies CABG/stent.      hyperlipidemia ECG has been reviewed. ekg 5/2021:  Normal sinus rhythm 60  Normal ECG   When compared with ECG of 16-JUL-2020 14:11,   No significant change was found    Pulmonary:   Denies COPD.  Denies Asthma.  Denies Sleep Apnea.    Renal/:  Renal/ Normal     Hepatic/GI:   Bowel Prep. Denies GERD. Denies Liver Disease.  Denies Hepatitis.    Musculoskeletal:   Osteopenia   Neurological:   Denies CVA. Denies Seizures.    Endocrine:   Denies Diabetes. Denies Hypothyroidism. Denies Hyperthyroidism. Hyperaldosteronism   Psych:   insomnia         Physical Exam  General:  Well nourished    Airway/Jaw/Neck:  Airway Findings: Mouth Opening: Normal Tongue: Normal  General Airway Assessment: Adult  Mallampati: II      Dental:  Dental Findings: In tact   Chest/Lungs:  Chest/Lungs Findings: Clear to auscultation, Normal Respiratory Rate     Heart/Vascular:  Heart Findings: Rate: Normal  Rhythm: Regular Rhythm             Anesthesia Plan  Type of Anesthesia, risks & benefits discussed:  Anesthesia Type:  MAC    Patient's Preference:   Plan Factors:          Intra-op Monitoring Plan: standard ASA monitors  Intra-op Monitoring Plan Comments:   Post Op Pain Control Plan:   Post Op Pain Control Plan Comments:     Induction:   IV  Beta Blocker:  Patient is on a  Beta-Blocker and has received one dose within the past 24 hours (No further documentation required).       Informed Consent: Patient understands risks and agrees with Anesthesia plan.  Questions answered. Anesthesia consent signed with patient.  ASA Score: 2     Day of Surgery Review of History & Physical: I have interviewed and examined the patient. I have reviewed the patient's H&P dated:  There are no significant changes.  H&P update referred to the surgeon.         Ready For Surgery From Anesthesia Perspective.

## 2022-02-18 NOTE — PLAN OF CARE
Dr Quiroz came to bedside to discuss findings. Vital signs stable, no patient c/o nausea/vomitting, no abdominal pain, no gi bleeding. Patient to be discharged from unit.

## 2022-02-24 LAB
FINAL PATHOLOGIC DIAGNOSIS: NORMAL
GROSS: NORMAL
Lab: NORMAL

## 2022-03-04 ENCOUNTER — LAB VISIT (OUTPATIENT)
Dept: LAB | Facility: HOSPITAL | Age: 67
End: 2022-03-04
Attending: INTERNAL MEDICINE
Payer: MEDICARE

## 2022-03-04 DIAGNOSIS — I10 HTN (HYPERTENSION), BENIGN: ICD-10-CM

## 2022-03-04 LAB
ALBUMIN SERPL BCP-MCNC: 4.1 G/DL (ref 3.5–5.2)
ALP SERPL-CCNC: 73 U/L (ref 55–135)
ALT SERPL W/O P-5'-P-CCNC: 26 U/L (ref 10–44)
ANION GAP SERPL CALC-SCNC: 12 MMOL/L (ref 8–16)
AST SERPL-CCNC: 26 U/L (ref 10–40)
BILIRUB SERPL-MCNC: 0.6 MG/DL (ref 0.1–1)
BUN SERPL-MCNC: 17 MG/DL (ref 8–23)
CALCIUM SERPL-MCNC: 9.9 MG/DL (ref 8.7–10.5)
CHLORIDE SERPL-SCNC: 105 MMOL/L (ref 95–110)
CO2 SERPL-SCNC: 26 MMOL/L (ref 23–29)
CREAT SERPL-MCNC: 1 MG/DL (ref 0.5–1.4)
EST. GFR  (AFRICAN AMERICAN): >60 ML/MIN/1.73 M^2
EST. GFR  (NON AFRICAN AMERICAN): 58.4 ML/MIN/1.73 M^2
GLUCOSE SERPL-MCNC: 105 MG/DL (ref 70–110)
POTASSIUM SERPL-SCNC: 4.6 MMOL/L (ref 3.5–5.1)
PROT SERPL-MCNC: 6.9 G/DL (ref 6–8.4)
SODIUM SERPL-SCNC: 143 MMOL/L (ref 136–145)

## 2022-03-04 PROCEDURE — 36415 COLL VENOUS BLD VENIPUNCTURE: CPT | Performed by: INTERNAL MEDICINE

## 2022-03-04 PROCEDURE — 80053 COMPREHEN METABOLIC PANEL: CPT | Performed by: INTERNAL MEDICINE

## 2022-03-09 ENCOUNTER — PATIENT OUTREACH (OUTPATIENT)
Dept: ADMINISTRATIVE | Facility: OTHER | Age: 67
End: 2022-03-09
Payer: MEDICARE

## 2022-03-09 ENCOUNTER — OFFICE VISIT (OUTPATIENT)
Dept: NEPHROLOGY | Facility: CLINIC | Age: 67
End: 2022-03-09
Payer: MEDICARE

## 2022-03-09 VITALS
SYSTOLIC BLOOD PRESSURE: 118 MMHG | HEART RATE: 63 BPM | HEIGHT: 63 IN | BODY MASS INDEX: 25.78 KG/M2 | DIASTOLIC BLOOD PRESSURE: 80 MMHG | WEIGHT: 145.5 LBS | RESPIRATION RATE: 18 BRPM

## 2022-03-09 DIAGNOSIS — I10 PRIMARY HYPERTENSION: Primary | ICD-10-CM

## 2022-03-09 PROCEDURE — 99213 OFFICE O/P EST LOW 20 MIN: CPT | Mod: PBBFAC | Performed by: INTERNAL MEDICINE

## 2022-03-09 PROCEDURE — 99215 PR OFFICE/OUTPT VISIT, EST, LEVL V, 40-54 MIN: ICD-10-PCS | Mod: S$PBB,,, | Performed by: INTERNAL MEDICINE

## 2022-03-09 PROCEDURE — 99215 OFFICE O/P EST HI 40 MIN: CPT | Mod: S$PBB,,, | Performed by: INTERNAL MEDICINE

## 2022-03-09 PROCEDURE — 99999 PR PBB SHADOW E&M-EST. PATIENT-LVL III: ICD-10-PCS | Mod: PBBFAC,,, | Performed by: INTERNAL MEDICINE

## 2022-03-09 PROCEDURE — 99999 PR PBB SHADOW E&M-EST. PATIENT-LVL III: CPT | Mod: PBBFAC,,, | Performed by: INTERNAL MEDICINE

## 2022-03-09 NOTE — PROGRESS NOTES
Kacy Blackwell is a 66 y.o. female for whom nephrology consult has been requested to evaluate and give opinion.   Renal clinic f/u note:  Date of clinic visit: 3/9/22  Reason for f/u and chief c/o: HTN    HPI: Pt was seen and examined. Labs and meds reviewed. Pt is a 67 y/o female with h/o of HTN who presents for f/u. Pt is new to me, was being seen by Dr. Barrera in the past. Pt was last seen in renal clinic about 1 year ago. Pt is feeling well today, no new or acute c/o's. Chart was reviewed. Based on intermittently and slightly low K, hyperaldosteronism was suspected and pt was placed on amiloride and K supp, and has stayed on them. However, only aldosterone/renin were measured twice, both times unremarkable. Pt did not have a urine test done for aldosterone level. CT abd was unremarkable for the adrenal glands. Noted BP has not been high to suspect a hyperaldosterone state at this point. Labs and meds reviewed with pt.    PAST MEDICAL HISTORY:  HTN, Hyperlipidemia, Osteopenia, and Seasonal allergies.    PAST SURGICAL HISTORY:  She  has a past surgical history that includes Hysterectomy (1980); cyst removed from head; Colonoscopy (N/A, 11/17/2021); and Colonoscopy (N/A, 2/18/2022).    SOCIAL HISTORY:  She  reports that she has never smoked. She has never used smokeless tobacco. She reports current alcohol use. She reports that she does not use drugs.    FAMILY MEDICAL HISTORY:  Her family history includes Arthritis in her father and mother; Breast cancer in her other; Cancer in her sister; Diabetes in her brother and mother; Heart failure in her mother; Hypertension in her brother, father, mother, and sister; Stroke in her brother.    Review of patient's allergies indicates:   Allergen Reactions    Diovan [valsartan]      angioedema           Prior to Admission medications    Medication Sig Start Date End Date Taking? Authorizing Provider   aMILoride (MIDAMOR) 5 MG Tab Take 2 tablets (10 mg total) by mouth  once daily. 2/11/22 2/11/23 Yes Malgorzata Skaggs MD   amLODIPine (NORVASC) 2.5 MG tablet Take 1 tablet (2.5 mg total) by mouth once daily. 5/27/21 5/27/22 Yes Gurinder Pierce MD   aspirin (ECOTRIN) 81 MG EC tablet Take 81 mg by mouth once daily.   Yes Historical Provider   BIOTIN ORAL Take 10,000 mg by mouth once daily.    Yes Historical Provider   calcium carbonate-vit D3-min 600 mg calcium- 400 unit Tab Take 1 tablet by mouth once daily.   Yes Historical Provider   cetirizine (ZYRTEC) 10 MG tablet Take 10 mg by mouth once daily.   Yes Historical Provider   dextromethorphan-guaifenesin  mg (MUCINEX DM)  mg per 12 hr tablet Take 1 tablet by mouth once daily.   Yes Historical Provider   hydrOXYzine HCL (ATARAX) 25 MG tablet Take 1 tablet (25 mg total) by mouth nightly as needed for Itching (overnight skin sensation). 3/9/21  Yes Gurinder Pierce MD   KLOR-CON M20 20 mEq tablet TAKE ONE TABLET BY MOUTH TWICE A DAY 6/17/21  Yes Gurinder Pierce MD   metoprolol succinate (TOPROL-XL) 100 MG 24 hr tablet TAKE ONE TABLET BY MOUTH EVERY DAY 2/24/22  Yes Gurinder Pierce MD   multivitamin capsule Take 1 capsule by mouth once daily.   Yes Historical Provider   pantoprazole (PROTONIX) 40 MG tablet TAKE 1 TABLET BY MOUTH ONCE DAILY 1/29/22  Yes Kalani Quiroz MD   rosuvastatin (CRESTOR) 5 MG tablet Take 1 tablet (5 mg total) by mouth nightly. 4/7/21  Yes Gurinder Pierce MD   zolpidem (AMBIEN) 5 MG Tab Take 1 tablet (5 mg total) by mouth nightly as needed. 7/14/21  Yes Gurinder Pierce MD   estradioL (ESTRACE) 0.01 % (0.1 mg/gram) vaginal cream Place 1 g vaginally once daily for 14 days, THEN 1 g twice a week. 12/8/21 12/8/22  Cindy Wood NP   hyoscyamine (LEVSIN/SL) 0.125 mg Subl Place 1 tablet (0.125 mg total) under the tongue every 4 (four) hours as needed (cramping). 12/2/21 1/31/22  Kalani Quiroz MD        REVIEW OF SYSTEMS:  Patient has no fever, fatigue, visual changes, chest pain,  "edema, cough, dyspnea, nausea, vomiting, constipation, diarrhea, arthralgias, pruritis, dizziness, weakness, depression, confusion.    PHYSICAL EXAM:   height is 5' 3" (1.6 m) and weight is 66 kg (145 lb 8.1 oz). Her blood pressure is 118/80 and her pulse is 63. Her respiration is 18.   Gen: WDWN female in no apparent distress  Psych: Normal mood and affect  Skin: No rashes or ulcers  Neck: No JVD  Chest: Clear with no rales, rhonchi, wheezing with normal effort  CV: Regular with no murmurs, gallops or rubs  Abd: Soft, nontender, no distension  Ext: No edema    Labs reviewed  BMP  Lab Results   Component Value Date     03/04/2022    K 4.6 03/04/2022     03/04/2022    CO2 26 03/04/2022    BUN 17 03/04/2022    CREATININE 1.0 03/04/2022    CALCIUM 9.9 03/04/2022    ANIONGAP 12 03/04/2022    ESTGFRAFRICA >60.0 03/04/2022    EGFRNONAA 58.4 (A) 03/04/2022     Past aldosterone/renin ratios reviewed. Unremarkable    CT abd shows normal adrenal glands      IMPRESSION AND RECOMMENDATIONS:  65 y/o female with HTN presents for f/u:    1. HTN: likely primary/essentail HTN  BP currently not high enough, and pt on minimal number of BP meds, to suspect a hyperaldosterone state  Had only aldosterone/renin ratio done twice, both unremarkable  CT scan of the abdomen unremarkable for adrenal glands  Doubt hyperaldosteronism present  Pt was started on amiloride then and has stayed on it. OK to continue. Weak duiretic  BP well controlled at present  K normal    2. Renal: s Cr unchanged, at baseline, in normal range  Stable renal function    Plans and recommendations:  As discussed above  Total time spent 40 minutes including time needed to review the records, the   patient evaluation, documentation, face-to-face discussion with the patient,   more than 50% of the time was spent on coordination of care and counseling.    Level V visit.  F/u with Dr. Pierce.  OK for pt to be d/debbie from renal clinic. Will be happy to see pt if " new issues arise or if there are any questions    Malgorzata Skaggs MD

## 2022-04-20 NOTE — TELEPHONE ENCOUNTER
Refill Routing Note   Medication(s) are not appropriate for processing by [facility] for the following reason(s):      - Outside of protocol    [facility] action(s):  Route          Medication reconciliation completed: No     Appointments  past 12m or future 3m with PCP    Date Provider   Last Visit   5/27/2021 Gurinder Pierce MD   Next Visit   Visit date not found Gurinder Pierce MD   ED visits in past 90 days: 0        Note composed:7:52 AM 04/20/2022

## 2022-04-20 NOTE — TELEPHONE ENCOUNTER
No new care gaps identified.  Powered by [employer]. Reference number: [id number].   4/20/2022 1:03:07 AM CDT

## 2022-04-21 ENCOUNTER — PATIENT MESSAGE (OUTPATIENT)
Dept: INTERNAL MEDICINE | Facility: CLINIC | Age: 67
End: 2022-04-21
Payer: MEDICARE

## 2022-04-21 ENCOUNTER — TELEPHONE (OUTPATIENT)
Dept: INTERNAL MEDICINE | Facility: CLINIC | Age: 67
End: 2022-04-21
Payer: MEDICARE

## 2022-04-21 RX ORDER — ZOLPIDEM TARTRATE 5 MG/1
5 TABLET ORAL NIGHTLY PRN
Qty: 30 TABLET | Refills: 1 | Status: SHIPPED | OUTPATIENT
Start: 2022-04-21 | End: 2022-09-21 | Stop reason: SDUPTHER

## 2022-04-21 NOTE — TELEPHONE ENCOUNTER
Medicine approved for one REFILL. Needs follow up appointment with me or MARIO in person or virtually

## 2022-05-09 DIAGNOSIS — Z00.00 ROUTINE GENERAL MEDICAL EXAMINATION AT A HEALTH CARE FACILITY: Primary | ICD-10-CM

## 2022-05-25 ENCOUNTER — CLINICAL SUPPORT (OUTPATIENT)
Dept: INTERNAL MEDICINE | Facility: CLINIC | Age: 67
End: 2022-05-25
Payer: MEDICARE

## 2022-05-25 ENCOUNTER — OFFICE VISIT (OUTPATIENT)
Dept: INTERNAL MEDICINE | Facility: CLINIC | Age: 67
End: 2022-05-25
Payer: MEDICARE

## 2022-05-25 ENCOUNTER — HOSPITAL ENCOUNTER (OUTPATIENT)
Dept: CARDIOLOGY | Facility: HOSPITAL | Age: 67
Discharge: HOME | End: 2022-05-25
Payer: MEDICARE

## 2022-05-25 VITALS
RESPIRATION RATE: 16 BRPM | DIASTOLIC BLOOD PRESSURE: 73 MMHG | WEIGHT: 141.13 LBS | BODY MASS INDEX: 25.01 KG/M2 | TEMPERATURE: 98 F | HEART RATE: 61 BPM | HEIGHT: 63 IN | SYSTOLIC BLOOD PRESSURE: 124 MMHG

## 2022-05-25 DIAGNOSIS — I10 HTN (HYPERTENSION), BENIGN: ICD-10-CM

## 2022-05-25 DIAGNOSIS — R79.9 ABNORMAL BLOOD CHEMISTRY: Primary | ICD-10-CM

## 2022-05-25 DIAGNOSIS — Z00.00 ROUTINE GENERAL MEDICAL EXAMINATION AT A HEALTH CARE FACILITY: Primary | ICD-10-CM

## 2022-05-25 DIAGNOSIS — E87.6 HYPOKALEMIA: ICD-10-CM

## 2022-05-25 DIAGNOSIS — E78.5 HYPERLIPIDEMIA, UNSPECIFIED HYPERLIPIDEMIA TYPE: ICD-10-CM

## 2022-05-25 DIAGNOSIS — M85.80 OSTEOPENIA, UNSPECIFIED LOCATION: ICD-10-CM

## 2022-05-25 DIAGNOSIS — E26.9 HYPERALDOSTERONISM: ICD-10-CM

## 2022-05-25 DIAGNOSIS — R53.83 FATIGUE, UNSPECIFIED TYPE: ICD-10-CM

## 2022-05-25 DIAGNOSIS — Z00.00 ROUTINE GENERAL MEDICAL EXAMINATION AT A HEALTH CARE FACILITY: ICD-10-CM

## 2022-05-25 DIAGNOSIS — E78.2 MIXED HYPERLIPIDEMIA: ICD-10-CM

## 2022-05-25 PROBLEM — R00.2 PALPITATIONS: Status: RESOLVED | Noted: 2020-07-06 | Resolved: 2022-05-25

## 2022-05-25 LAB
ALBUMIN SERPL BCP-MCNC: 3.8 G/DL (ref 3.5–5.2)
ALP SERPL-CCNC: 67 U/L (ref 55–135)
ALT SERPL W/O P-5'-P-CCNC: 24 U/L (ref 10–44)
ANION GAP SERPL CALC-SCNC: 6 MMOL/L (ref 8–16)
AST SERPL-CCNC: 22 U/L (ref 10–40)
BILIRUB SERPL-MCNC: 0.6 MG/DL (ref 0.1–1)
BUN SERPL-MCNC: 18 MG/DL (ref 8–23)
CALCIUM SERPL-MCNC: 9.1 MG/DL (ref 8.7–10.5)
CHLORIDE SERPL-SCNC: 107 MMOL/L (ref 95–110)
CHOLEST SERPL-MCNC: 151 MG/DL (ref 120–199)
CHOLEST/HDLC SERPL: 2.4 {RATIO} (ref 2–5)
CO2 SERPL-SCNC: 26 MMOL/L (ref 23–29)
CREAT SERPL-MCNC: 0.9 MG/DL (ref 0.5–1.4)
ERYTHROCYTE [DISTWIDTH] IN BLOOD BY AUTOMATED COUNT: 13.3 % (ref 11.5–14.5)
EST. GFR  (AFRICAN AMERICAN): >60 ML/MIN/1.73 M^2
EST. GFR  (NON AFRICAN AMERICAN): >60 ML/MIN/1.73 M^2
GLUCOSE SERPL-MCNC: 100 MG/DL (ref 70–110)
HCT VFR BLD AUTO: 38.9 % (ref 37–48.5)
HDLC SERPL-MCNC: 63 MG/DL (ref 40–75)
HDLC SERPL: 41.7 % (ref 20–50)
HGB BLD-MCNC: 12.4 G/DL (ref 12–16)
LDLC SERPL CALC-MCNC: 75 MG/DL (ref 63–159)
MCH RBC QN AUTO: 30.1 PG (ref 27–31)
MCHC RBC AUTO-ENTMCNC: 31.9 G/DL (ref 32–36)
MCV RBC AUTO: 94 FL (ref 82–98)
NONHDLC SERPL-MCNC: 88 MG/DL
PLATELET # BLD AUTO: 236 K/UL (ref 150–450)
PMV BLD AUTO: 10 FL (ref 9.2–12.9)
POTASSIUM SERPL-SCNC: 4 MMOL/L (ref 3.5–5.1)
PROT SERPL-MCNC: 6.8 G/DL (ref 6–8.4)
RBC # BLD AUTO: 4.12 M/UL (ref 4–5.4)
SODIUM SERPL-SCNC: 139 MMOL/L (ref 136–145)
TRIGL SERPL-MCNC: 65 MG/DL (ref 30–150)
WBC # BLD AUTO: 5.45 K/UL (ref 3.9–12.7)

## 2022-05-25 PROCEDURE — 93005 ELECTROCARDIOGRAM TRACING: CPT

## 2022-05-25 PROCEDURE — 99999 PR PBB SHADOW E&M-EST. PATIENT-LVL III: CPT | Mod: PBBFAC,,, | Performed by: FAMILY MEDICINE

## 2022-05-25 PROCEDURE — 80061 LIPID PANEL: CPT | Performed by: FAMILY MEDICINE

## 2022-05-25 PROCEDURE — 99213 OFFICE O/P EST LOW 20 MIN: CPT | Mod: PBBFAC | Performed by: FAMILY MEDICINE

## 2022-05-25 PROCEDURE — 99397 PER PM REEVAL EST PAT 65+ YR: CPT | Mod: S$PBB,GY,, | Performed by: FAMILY MEDICINE

## 2022-05-25 PROCEDURE — 85027 COMPLETE CBC AUTOMATED: CPT | Performed by: FAMILY MEDICINE

## 2022-05-25 PROCEDURE — 80053 COMPREHEN METABOLIC PANEL: CPT | Performed by: FAMILY MEDICINE

## 2022-05-25 PROCEDURE — 99999 PR PBB SHADOW E&M-EST. PATIENT-LVL III: ICD-10-PCS | Mod: PBBFAC,,, | Performed by: FAMILY MEDICINE

## 2022-05-25 PROCEDURE — 99397 PR PREVENTIVE VISIT,EST,65 & OVER: ICD-10-PCS | Mod: S$PBB,GY,, | Performed by: FAMILY MEDICINE

## 2022-05-25 PROCEDURE — 93010 EKG 12-LEAD: ICD-10-PCS | Mod: ,,, | Performed by: INTERNAL MEDICINE

## 2022-05-25 PROCEDURE — 93010 ELECTROCARDIOGRAM REPORT: CPT | Mod: ,,, | Performed by: INTERNAL MEDICINE

## 2022-05-25 NOTE — PROGRESS NOTES
"Subjective:       Patient ID: Kacy Blackwell is a 66 y.o. female.    Chief Complaint: Executive Health    66-year-old  female patient of Dr. Pierce with Patient Active Problem List:     Hypokalemia     HTN (hypertension), benign     Hyperlipidemia     Hyperaldosteronism     Other insomnia     Osteopenia  Here for executive health physical.  Patient has been staying physically active and denies any chest pain or difficulty breathing with palpitations and reports that patient started having right sciatica off and on lately and has tried muscle relaxants with no relief.  Blood pressure has been stable    Review of Systems   Constitutional: Negative for fatigue.   Eyes: Negative for visual disturbance.   Respiratory: Negative for shortness of breath.    Cardiovascular: Negative for chest pain and leg swelling.   Gastrointestinal: Negative for abdominal pain, nausea and vomiting.   Musculoskeletal: Positive for myalgias.   Skin: Negative for rash.   Neurological: Positive for numbness. Negative for weakness, light-headedness and headaches.   Psychiatric/Behavioral: Negative for sleep disturbance.         /73 (BP Location: Left arm, Patient Position: Sitting, BP Method: Medium (Manual))   Pulse 61   Temp 97.8 °F (36.6 °C) (Tympanic)   Resp 16   Ht 5' 3" (1.6 m)   Wt 64 kg (141 lb 1.5 oz)   LMP  (LMP Unknown)   BMI 24.99 kg/m²   Objective:      Physical Exam  Constitutional:       Appearance: She is well-developed.   HENT:      Head: Normocephalic and atraumatic.   Cardiovascular:      Rate and Rhythm: Normal rate and regular rhythm.      Heart sounds: Normal heart sounds. No murmur heard.  Pulmonary:      Effort: Pulmonary effort is normal.      Breath sounds: Normal breath sounds. No wheezing.   Abdominal:      General: Bowel sounds are normal.      Palpations: Abdomen is soft.      Tenderness: There is no abdominal tenderness.   Musculoskeletal:         General: No tenderness.   Skin:     " General: Skin is warm and dry.      Findings: No rash.   Neurological:      Mental Status: She is alert and oriented to person, place, and time.   Psychiatric:         Mood and Affect: Mood normal.           Assessment/Plan:   1. Routine general medical examination at a health care facility  Vital signs stable today.  Clinical exam stable  Continue lifestyle modifications with low-fat and low-cholesterol diet and exercise 30 minutes daily  Patient was encouraged to consider getting tetanus and shingles vaccination if interested outside pharmacy and due for Pneumovax  Reviewed recent labs which looks stable    2. HTN (hypertension), benign  Blood pressure is stable currently on amiloride 10 mg daily, amlodipine 2.5 mg daily, metoprolol 100 mg daily    3. Mixed hyperlipidemia  Stable lipid profile on Crestor 5 mg daily    4. Hypokalemia  5. Hyperaldosteronism  Clinically doing well on potassium supplements 20 mg twice daily and blood pressure has been under control    6. Osteopenia, unspecified location   Continue calcium with vitamin-D supplements over-the-counter    Patient was advised to consider physical therapy for right-sided sciatica

## 2022-06-06 NOTE — TELEPHONE ENCOUNTER
Refill Routing Note   Medication(s) are not appropriate for processing by [facility] for the following reason(s):      - Patient has been seen in the ED/Hospital since the last PCP visit    [facility] action(s):  Defer       Medication Therapy Plan: Last PCP Visit: 5/27/2021 Last Admission: 2/18/2022  Medication reconciliation completed: No     Appointments  past 12m or future 3m with PCP    Date Provider   Last Visit   5/27/2021 Gurinder Pierce MD   Next Visit   6/6/2022 Gurinder Pierce MD   ED visits in past 90 days: 0        Note composed:3:48 PM 06/06/2022

## 2022-06-06 NOTE — TELEPHONE ENCOUNTER
No new care gaps identified.  [facility] Embedded Care Gaps. Reference number: [id number]. 6/06/2022   2:26:52 AM NATET

## 2022-06-07 RX ORDER — AMLODIPINE BESYLATE 2.5 MG/1
TABLET ORAL
Qty: 90 TABLET | Refills: 4 | Status: SHIPPED | OUTPATIENT
Start: 2022-06-07 | End: 2023-01-23

## 2022-08-05 DIAGNOSIS — E87.6 HYPOKALEMIA: ICD-10-CM

## 2022-08-05 RX ORDER — POTASSIUM CHLORIDE 1500 MG/1
TABLET, EXTENDED RELEASE ORAL
Qty: 180 TABLET | Refills: 0 | Status: SHIPPED | OUTPATIENT
Start: 2022-08-05 | End: 2023-02-01

## 2022-08-05 NOTE — TELEPHONE ENCOUNTER
Refill Decision Note   Kacy Rishi  is requesting a refill authorization.  Brief Assessment and Rationale for Refill:  Approve     Medication Therapy Plan:       Medication Reconciliation Completed: No   Comments:     No Care Gaps recommended.     Note composed:1:35 PM 08/05/2022

## 2022-08-05 NOTE — TELEPHONE ENCOUNTER
No new care gaps identified.  [facility] Embedded Care Gaps. Reference number: [id number]. 8/05/2022   1:18:28 PM CDT

## 2022-08-09 DIAGNOSIS — E87.6 HYPOKALEMIA: ICD-10-CM

## 2022-08-09 RX ORDER — POTASSIUM CHLORIDE 1500 MG/1
TABLET, EXTENDED RELEASE ORAL
Qty: 180 TABLET | Refills: 0 | OUTPATIENT
Start: 2022-08-09

## 2022-08-09 NOTE — TELEPHONE ENCOUNTER
No new care gaps identified.  [facility] Embedded Care Gaps. Reference number: [id number]. 8/09/2022   3:23:11 PM CDT

## 2022-08-10 NOTE — TELEPHONE ENCOUNTER
Refill Decision Note   Kacy Rishi  is requesting a refill authorization.  Brief Assessment and Rationale for Refill:  Quick Discontinue     Medication Therapy Plan:  sent on 8/5    Medication Reconciliation Completed: No   Comments:     No Care Gaps recommended.     Note composed:8:27 PM 08/09/2022

## 2022-08-13 DIAGNOSIS — E87.6 HYPOKALEMIA: ICD-10-CM

## 2022-08-13 NOTE — TELEPHONE ENCOUNTER
No new care gaps identified.  [facility] Embedded Care Gaps. Reference number: [id number]. 8/13/2022   1:30:52 PM CDT

## 2022-08-14 RX ORDER — POTASSIUM CHLORIDE 1500 MG/1
TABLET, EXTENDED RELEASE ORAL
Qty: 180 TABLET | Refills: 0 | OUTPATIENT
Start: 2022-08-14

## 2022-08-14 NOTE — TELEPHONE ENCOUNTER
Pharmacy Call Documentation    Pharmacy Called:  [facility] Call Time: 18:58   Spoke with: Prescriber manuelito 08/14/2022       Called to verify that the script that was sent on: 8/10/22 for 12   month supply was received by the pharmacy.         Request will be: refused     Additional actions required: no      Current Medication Requested:   Requested Prescriptions     Pending Prescriptions Disp Refills    KLOR-CON M20 20 mEq tablet [Pharmacy Med Name: KLOR-CON M20 TABLET] 180 tablet 0     Sig: TAKE ONE TABLET BY MOUTH TWICE A DAY        Note composed:6:57 PM 08/14/2022

## 2022-08-23 NOTE — PROGRESS NOTES
[facility]  [address]  [facility] [address]      Carrie Mckeon :1990 MRN:[id number]    2022 Time Session Began: 3:30 PM  Time Session Ended: 4:15 PM    Due to COVID-19 precautions, this visit was performed via live interactive two-way Video visit with patient's verbal consent.   Clinician Location:Home.  Patient Location: Home.  Verified patient identity:  [x] Yes    Session Type:Therapy 38-52 minutes (80486)    Others Present:     Intervention: Behavioral, Cognitive, Systemic    Suicide/Homicide/Violence Ideation: No    If Yes, explain:     Current Outpatient Medications   Medication Sig   • lisinopril (ZESTRIL) 10 MG tablet TAKE 1 TABLET BY MOUTH DAILY   • metFORMIN (GLUCOPHAGE-XR) 500 MG 24 hr tablet TAKE 1 TABLET BY MOUTH DAILY WITH BREAKFAST   • ergocalciferol (DRISDOL) 1.25 mg (50,000 units) capsule Take 1 capsule by mouth 1 day a week.   • olopatadine (Patanase) 0.6 % nasal spray Spray 2 sprays in each nostril 2 times daily. 2 sprays each nostril twice daily   • sertraline (Zoloft) 100 MG tablet Take 1 tablet by mouth daily.   • LORazepam (ATIVAN) 0.5 MG tablet Take 1 tablet by mouth 2 times daily as needed for Anxiety.     No current facility-administered medications for this visit.       Change in Medication(s) Reported: No  If Yes, explain:     Patient/Family Education Provided: Yes  Patient/Family Displays Understanding: Yes    If No, explain:     Chief complaint in patient's own words: \"I was really anxious last week.\"    Progress Note containing chief complaint and symptoms/problems related to the complaint:    (Data/Action/Response/Plan)    D: The patient discusses the triggers for her anxiety. She was having a tree cut down in her yard, and her mother was taking a trip. The patient has not needed to interact with the aggressive persons at work. IT is stuck with that. She reports they make you statements and  Subjective:       Patient ID: Kacy Blackwell is a 64 y.o.   female who presents for follow-up evaluation of CKD stage 2, HTN     Gurinder Pierce MD      HPI : Ms Kacy Blackwell is a 64 -year-old AA woman seen in office today in f/u  for above medical problems .  I saw her in clinic about 6 months ago.  Recent labs reviewed and discussed with the patient.  Stable renal function with a serum creatinine at 1 mg/dL.  she has hypertension, currently on Amiloride and metoprolol, recent cardiology notes reviewed, she has been having some shortness of breath, echocardiogram and stress test are pending at this time, no recent hospitalizations or ER visits,    In 2017 patient had workup for some primary hyperaldosteronism, was eval by  endocrinology , Serum renin/Ezra ratio  significantly elevated.  Next up in the workup include adrenal vein sampling, CT scan of the abdomen showed  normal adrenal glands.  Patient did not want to proceed with any invasive testing and wanted to continue management of her condition with medications alone.   Her mother also has hypertension and is on about 8 blood pressure medications, she is 98.   Patient has chronic hypokalemia dating back to 2009. She was started on potassium supplements.  Patient was initially started on Aldactone. Later  switched to triamterene secondary to concerns of side effects of aldactone, and now Amiloride due to insurance coverage ,         Past Medical History:   Diagnosis Date    Chronic cough     controlled w flonase/zyrtec    Hyperaldosteronism     dr cummings/marcello    Hyperlipidemia     Hypertension     Hypokalemia     Osteopenia     Seasonal allergies        Current Outpatient Medications on File Prior to Visit   Medication Sig Dispense Refill    aMILoride (MIDAMOR) 5 MG Tab Take 2 tablets (10 mg total) by mouth once daily. 60 tablet 11    BIOTIN ORAL Take 10,000 mg by mouth once daily.       calcium carbonate-vit D3-min  (CALTRATE 600+D PLUS MINERALS) 600 mg calcium- 400 unit Tab Take 1 tablet by mouth once daily.      cetirizine (ZYRTEC) 10 MG tablet Take 10 mg by mouth once daily.      dextromethorphan-guaifenesin  mg (MUCINEX DM)  mg per 12 hr tablet Take 1 tablet by mouth once daily.      metoprolol succinate (TOPROL-XL) 100 MG 24 hr tablet Take 1 tablet (100 mg total) by mouth once daily. 30 tablet 11    multivitamin capsule Take 1 capsule by mouth once daily.      peg 400-propylene glycol, PF, (SYSTANE, PF,) 0.4-0.3 % Dpet Apply to eye.      potassium chloride SA (K-DUR,KLOR-CON) 20 MEQ tablet TAKE ONE TABLET BY MOUTH TWICE DAILY 60 tablet 11    rosuvastatin (CRESTOR) 5 MG tablet TAKE 1 TABLET BY MOUTH NIGHTLY. 30 tablet 11    zolpidem (AMBIEN) 5 MG Tab Take 1 tablet (5 mg total) by mouth nightly as needed. 30 tablet 5    fluticasone propionate (FLONASE) 50 mcg/actuation nasal spray 2 sprays (100 mcg total) by Each Nare route once daily. 16 g 11     No current facility-administered medications on file prior to visit.          Review of Systems   Constitutional: Negative for activity change, appetite change, fatigue and fever.   HENT: Negative for congestion, facial swelling, sore throat, trouble swallowing and voice change.    Eyes: Negative for redness and visual disturbance.   Respiratory: Negative for apnea, cough, chest tightness, shortness of breath and wheezing.    Cardiovascular: Negative for chest pain, palpitations and leg swelling.   Gastrointestinal: Negative for abdominal distention, abdominal pain, blood in stool, constipation, diarrhea, nausea and vomiting.   Genitourinary: Negative for decreased urine volume, difficulty urinating, dysuria, flank pain, frequency, hematuria, pelvic pain and urgency.   Musculoskeletal: Negative for back pain, gait problem and joint swelling.   Skin: Negative for color change and rash.   Neurological: Negative for dizziness, syncope, weakness and headaches.  will blame her for their work not getting done. She ranks her mood overall as a 7 out of 10.    A: Provided active listening and helped the patient process the information from above. Helped the patient complete the ABCD thought diary for her triggers, and suggested a problem solving structure for dealing with the aggressive co-workers.    R: The patient was receptive to feedback.    P: Continue with individual therapy.    Need for Community Resources Assessed: Yes    Resources Needed: Unsure    If Yes, what resources:     Primary Diagnosis: MARY ANN (generalized anxiety disorder)  (primary encounter diagnosis)    Treatment Plan: Unchanged, See Treatment Plan    Discharge Plan: Strategies Discussed to Maintain Gains, Continue with M.D.    Next Appointment: 9-22-22  JAVIER Fiore     Hematological: Does not bruise/bleed easily.   Psychiatric/Behavioral: Negative for agitation, behavioral problems and confusion. The patient is not nervous/anxious.      :              Objective:           Vitals:    07/14/20 1110   BP: 138/82   Pulse: 64   Resp: 17       Weight 152 lbs , stable     Physical Exam  :      Constitutional: She is oriented to person, place, and time. She appears well-developed and well-nourished. No distress.   HENT: Head: Normocephalic and atraumatic. Mouth/Throat: Oropharynx is clear and moist. No oropharyngeal exudate.   Eyes: Conjunctivae normal and EOM are normal. Pupils are equal, round, and reactive to light. No scleral icterus.   Neck: Normal range of motion. Neck supple. No JVD present. Carotid bruit is not present. No tracheal deviation present. No mass and no thyromegaly present.   Cardiovascular: Normal rate, regular rhythm, normal heart sounds and intact distal pulses. Exam reveals no gallop and no friction rub.   No murmur heard.  Pulmonary/Chest: Effort normal and breath sounds normal. No respiratory distress. She has no wheezes. She has no rales. She exhibits no tenderness.   Abdominal: Soft. Bowel sounds are normal. She exhibits no distension, no abdominal bruit, no ascites and no mass. There is no hepatosplenomegaly. There is no tenderness. There is no rebound, no guarding and no CVA tenderness.   Musculoskeletal: Normal range of motion. She exhibits no edema and no tenderness.   Lymphadenopathy: She has no cervical adenopathy.   Neurological: She is alert and oriented to person, place, and time. She has normal reflexes. No cranial nerve deficit. She exhibits normal muscle tone. Coordination normal.   Skin: Skin is warm and intact. No rash noted. No erythema. No pallor.   Psychiatric: She has a normal mood and affect. Her behavior is normal. Judgment normal.       Labs:    Lab Results   Component Value Date    CREATININE 1.1 07/06/2020    BUN 19 07/06/2020      07/06/2020    K 4.4 07/06/2020     07/06/2020    CO2 31 (H) 07/06/2020       Lab Results   Component Value Date    WBC 7.50 07/06/2020    HGB 12.5 07/06/2020    HCT 40.9 07/06/2020    MCV 96 07/06/2020     07/06/2020         Lab Results   Component Value Date    PTH 25.0 07/06/2020    CALCIUM 10.3 07/06/2020    PHOS 3.7 07/06/2020       Lab Results   Component Value Date    ALBUMIN 4.1 07/06/2020       Lab Results   Component Value Date    URICACID 5.9 (H) 12/01/2017          64  -year-old  woman seen office today in f/u for following medical problems ,      1.  Hypertension - blood pressure is controlled,       2.  Primary hyperaldosteronism. CAT scan of the abdomen shows normal adrenal glands.  Continue amiloride and potassium supplements,      on potassium chloride 20 mEq twice a day.    patient did not want to proceed with adrenal vein sampling,     3. CKD stage 2  : Stable renal function with a serum creatinine of 1.1 mg/dL, advised patient to avoid NSAID use.     4.  Recent urinalysis shows normal range proteinuria.     5.  Hypokalemia - continue potassium supplements and triamterene, discussed potassium rich diet,     6.  Metabolic alkalosis discussed adequate fluid intake, advised patient to get evaluated for RASHAUN ,       We'll see her in followup in about 5 months.   more than 25 minutes of face-to-face time was spent with the patient discussing labs and plan of care.            Cliff Barrera M.D.

## 2022-09-21 ENCOUNTER — OFFICE VISIT (OUTPATIENT)
Dept: INTERNAL MEDICINE | Facility: CLINIC | Age: 67
End: 2022-09-21
Payer: MEDICARE

## 2022-09-21 VITALS
BODY MASS INDEX: 26.41 KG/M2 | DIASTOLIC BLOOD PRESSURE: 76 MMHG | HEIGHT: 63 IN | SYSTOLIC BLOOD PRESSURE: 118 MMHG | HEART RATE: 73 BPM | OXYGEN SATURATION: 98 % | TEMPERATURE: 98 F | WEIGHT: 149.06 LBS

## 2022-09-21 DIAGNOSIS — E78.2 MIXED HYPERLIPIDEMIA: ICD-10-CM

## 2022-09-21 DIAGNOSIS — I10 HTN (HYPERTENSION), BENIGN: ICD-10-CM

## 2022-09-21 DIAGNOSIS — G47.09 OTHER INSOMNIA: Primary | ICD-10-CM

## 2022-09-21 DIAGNOSIS — M85.80 OSTEOPENIA, UNSPECIFIED LOCATION: ICD-10-CM

## 2022-09-21 PROCEDURE — 99214 PR OFFICE/OUTPT VISIT, EST, LEVL IV, 30-39 MIN: ICD-10-PCS | Mod: S$PBB,,, | Performed by: NURSE PRACTITIONER

## 2022-09-21 PROCEDURE — 99999 PR PBB SHADOW E&M-EST. PATIENT-LVL IV: ICD-10-PCS | Mod: PBBFAC,,, | Performed by: NURSE PRACTITIONER

## 2022-09-21 PROCEDURE — 99214 OFFICE O/P EST MOD 30 MIN: CPT | Mod: PBBFAC | Performed by: NURSE PRACTITIONER

## 2022-09-21 PROCEDURE — 99214 OFFICE O/P EST MOD 30 MIN: CPT | Mod: S$PBB,,, | Performed by: NURSE PRACTITIONER

## 2022-09-21 PROCEDURE — 99999 PR PBB SHADOW E&M-EST. PATIENT-LVL IV: CPT | Mod: PBBFAC,,, | Performed by: NURSE PRACTITIONER

## 2022-09-21 RX ORDER — ZOLPIDEM TARTRATE 5 MG/1
5 TABLET ORAL NIGHTLY PRN
Qty: 30 TABLET | Refills: 1 | Status: SHIPPED | OUTPATIENT
Start: 2022-09-21 | End: 2023-02-14 | Stop reason: SDUPTHER

## 2022-09-21 NOTE — PROGRESS NOTES
Chief Complaint  Chief Complaint   Patient presents with    Medication Refill         HPI     HPI  Kacy Blackwell is a 66 y.o. female with medical diagnoses as listed in the medical history and problem list that presents for Insomnia. This patient is new to me.    Insomnia: Reports for Ambien prescription. Has used 5 mg tablets in the past. She usually takes half a tablet. Reports medication works for her. Started medication after her  transitioned 6 years ago. Denies SI/HI. Denies Hallucination, Delusions or Illusions.       History     PAST MEDICAL HISTORY:  Past Medical History:   Diagnosis Date    Anticoagulant long-term use     aspirin 81mg    Chronic cough     controlled w flonase/zyrtec    Encounter for blood transfusion     Hyperaldosteronism     dr cummings/marcello    Hyperlipidemia     Hypertension     Hypokalemia     Osteopenia     Seasonal allergies        PAST SURGICAL HISTORY:  Past Surgical History:   Procedure Laterality Date    COLONOSCOPY N/A 11/17/2021    Procedure: COLONOSCOPY;  Surgeon: Kalani Quiroz MD;  Location: [facility] ENDO;  Service: Endoscopy;  Laterality: N/A;    COLONOSCOPY N/A 2/18/2022    Procedure: COLONOSCOPY;  Surgeon: Kalani Quiroz MD;  Location: [facility] ENDO;  Service: Endoscopy;  Laterality: N/A;    cyst removed from head      HYSTERECTOMY  1980    cervix absent       SOCIAL HISTORY:  Social History     Socioeconomic History    Marital status:    Tobacco Use    Smoking status: Never    Smokeless tobacco: Never   Substance and Sexual Activity    Alcohol use: Yes     Comment: rare social drinker    Drug use: No    Sexual activity: Not Currently     Partners: Male   Social History Narrative    . 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  Review of patient's allergies indicates:   Allergen Reactions    Diovan [valsartan]      angioedema     Current Outpatient Medications   Medication Sig Dispense Refill    aMILoride (MIDAMOR) 5 MG Tab Take 2 tablets (10 mg total) by mouth once daily. 60 tablet 11    amLODIPine (NORVASC) 2.5 MG tablet TAKE ONE TABLET BY MOUTH EVERY DAY 90 tablet 4    aspirin (ECOTRIN) 81 MG EC tablet Take 81 mg by mouth once daily.      BIOTIN ORAL Take 10,000 mg by mouth once daily.       calcium carbonate-vit D3-min 600 mg calcium- 400 unit Tab Take 1 tablet by mouth once daily.      cetirizine (ZYRTEC) 10 MG tablet Take 10 mg by mouth once daily.      hydrOXYzine HCL (ATARAX) 25 MG tablet Take 1 tablet (25 mg total) by mouth nightly as needed for Itching (overnight skin sensation). 30 tablet 5    KLOR-CON M20 20 mEq tablet TAKE ONE TABLET BY MOUTH TWICE A  tablet 0    metoprolol succinate (TOPROL-XL) 100 MG 24 hr tablet TAKE 1 TABLET BY MOUTH ONCE DAILY 90 tablet 1    multivitamin capsule Take 1 capsule by mouth once daily.      potassium chloride SA (KLOR-CON M20) 20 MEQ tablet Take 1 tablet (20 mEq total) by mouth 2 (two) times daily. 60 tablet 11    rosuvastatin (CRESTOR) 5 MG tablet TAKE ONE TABLET BY MOUTH NIGHTLY 90 tablet 4    zolpidem (AMBIEN) 5 MG Tab Take 1 tablet (5 mg total) by mouth nightly as needed. 30 tablet 1     No current facility-administered medications for this visit.           Exam     ROS  Review of Systems   Constitutional:  Negative for appetite change, chills, fatigue and fever.   HENT:  Negative for congestion, ear pain, postnasal drip, rhinorrhea, sinus pressure, sneezing and sore throat.    Respiratory:   "Negative for shortness of breath.    Cardiovascular:  Negative for chest pain and palpitations.   Gastrointestinal:  Negative for abdominal pain, constipation, diarrhea, nausea and vomiting.   Genitourinary:  Negative for dysuria.   Musculoskeletal:  Negative for arthralgias.   Neurological:  Negative for headaches.   Psychiatric/Behavioral:  Negative for sleep disturbance.          Physical Exam  Vitals:    09/21/22 1048   BP: 118/76   BP Location: Left arm   Patient Position: Sitting   BP Method: Large (Manual)   Pulse: 73   Temp: 97.7 °F (36.5 °C)   TempSrc: Tympanic   SpO2: 98%   Weight: 67.6 kg (149 lb 0.5 oz)   Height: 5' 3" (1.6 m)    Body mass index is 26.4 kg/m².  Weight: 67.6 kg (149 lb 0.5 oz)   Height: 5' 3" (160 cm)   Physical Exam  Constitutional:       General: She is not in acute distress.     Appearance: Normal appearance.   HENT:      Head: Normocephalic and atraumatic.      Right Ear: External ear normal.      Left Ear: External ear normal.      Nose: Nose normal.   Eyes:      Pupils: Pupils are equal, round, and reactive to light.   Cardiovascular:      Rate and Rhythm: Normal rate and regular rhythm.      Pulses: Normal pulses.   Pulmonary:      Effort: Pulmonary effort is normal. No respiratory distress.      Breath sounds: Normal breath sounds. No wheezing.   Abdominal:      General: Bowel sounds are normal.      Palpations: Abdomen is soft.   Musculoskeletal:      Cervical back: Neck supple.   Lymphadenopathy:      Cervical: No cervical adenopathy.   Skin:     General: Skin is warm and dry.      Capillary Refill: Capillary refill takes less than 2 seconds.   Neurological:      General: No focal deficit present.      Mental Status: She is alert and oriented to person, place, and time. Mental status is at baseline.   Psychiatric:         Mood and Affect: Mood normal.           Health Maintenance         Date Due Completion Date    Shingles Vaccine (2 of 3) 01/12/2015 11/17/2014    TETANUS " VACCINE 09/17/2019 9/17/2009    Pneumococcal Vaccines (Age 65+) (2 - PPSV23 or PCV20) 06/15/2021 6/15/2020    COVID-19 Vaccine (4 - Booster for Moderna series) 05/04/2022 1/4/2022    Influenza Vaccine (1) 09/01/2022 12/3/2021    DEXA Scan 12/11/2022 12/11/2020    Override on 4/19/2012: Done (Osteoporosis of the lumbar spine, osteopenia of the hip. repeat in 2 years.)    Mammogram 12/13/2022 12/13/2021    Override on 11/7/2011: Done    Lipid Panel 05/25/2023 5/25/2022    Colorectal Cancer Screening 02/18/2025 2/18/2022              Assessment & Plan     Assessment & Plan  Problem List Items Addressed This Visit          Cardiac/Vascular    HTN (hypertension), benign  -The current medical regimen is effective;  continue present plan and medications.     Hyperlipidemia  -The current medical regimen is effective;  continue present plan and medications.        Orthopedic    Osteopenia  -Stable; Monitor       Other    Other insomnia - Primary  -The current medical regimen is effective;  continue present plan and medications.     Relevant Medications    zolpidem (AMBIEN) 5 MG Tab, NIghtly          Health Maintenance reviewed: Deferred per patient    Follow-up: As needed     30+ minutes of total time spent on the encounter, which includes face to face time and non-face to face time preparing to see the patient (eg, review of tests), Obtaining and/or reviewing separately obtained history, documenting clinical information in the electronic or other health record, independently interpreting results (not separately reported) and communicating results to the patient/family/caregiver, or Care coordination (not separately reported).

## 2022-12-14 ENCOUNTER — HOSPITAL ENCOUNTER (OUTPATIENT)
Dept: RADIOLOGY | Facility: HOSPITAL | Age: 67
Discharge: HOME | End: 2022-12-14
Attending: FAMILY MEDICINE
Payer: MEDICARE

## 2022-12-14 VITALS — WEIGHT: 147.69 LBS | HEIGHT: 63 IN | BODY MASS INDEX: 26.17 KG/M2

## 2022-12-14 DIAGNOSIS — Z12.31 ENCOUNTER FOR SCREENING MAMMOGRAM FOR MALIGNANT NEOPLASM OF BREAST: ICD-10-CM

## 2022-12-14 PROCEDURE — 77067 SCR MAMMO BI INCL CAD: CPT | Mod: 26,,, | Performed by: RADIOLOGY

## 2022-12-14 PROCEDURE — 77067 MAMMO DIGITAL SCREENING BILAT WITH TOMO: ICD-10-PCS | Mod: 26,,, | Performed by: RADIOLOGY

## 2022-12-14 PROCEDURE — 77063 BREAST TOMOSYNTHESIS BI: CPT | Mod: 26,,, | Performed by: RADIOLOGY

## 2022-12-14 PROCEDURE — 77063 MAMMO DIGITAL SCREENING BILAT WITH TOMO: ICD-10-PCS | Mod: 26,,, | Performed by: RADIOLOGY

## 2022-12-14 PROCEDURE — 77063 BREAST TOMOSYNTHESIS BI: CPT | Mod: TC

## 2022-12-14 PROCEDURE — 77067 SCR MAMMO BI INCL CAD: CPT | Mod: TC

## 2023-01-27 DIAGNOSIS — Z00.00 ROUTINE GENERAL MEDICAL EXAMINATION AT A HEALTH CARE FACILITY: Primary | ICD-10-CM

## 2023-05-29 ENCOUNTER — HOSPITAL ENCOUNTER (OUTPATIENT)
Dept: CARDIOLOGY | Facility: HOSPITAL | Age: 68
Discharge: HOME | End: 2023-05-29
Payer: MEDICARE

## 2023-05-29 ENCOUNTER — CLINICAL SUPPORT (OUTPATIENT)
Dept: INTERNAL MEDICINE | Facility: CLINIC | Age: 68
End: 2023-05-29
Payer: MEDICARE

## 2023-05-29 ENCOUNTER — OFFICE VISIT (OUTPATIENT)
Dept: INTERNAL MEDICINE | Facility: CLINIC | Age: 68
End: 2023-05-29
Payer: MEDICARE

## 2023-05-29 VITALS
WEIGHT: 147 LBS | DIASTOLIC BLOOD PRESSURE: 76 MMHG | BODY MASS INDEX: 26.05 KG/M2 | SYSTOLIC BLOOD PRESSURE: 127 MMHG | TEMPERATURE: 97 F | HEIGHT: 63 IN | HEART RATE: 60 BPM

## 2023-05-29 DIAGNOSIS — R53.83 OTHER FATIGUE: ICD-10-CM

## 2023-05-29 DIAGNOSIS — R79.9 ABNORMAL BLOOD CHEMISTRY: ICD-10-CM

## 2023-05-29 DIAGNOSIS — G57.01 PIRIFORMIS SYNDROME OF RIGHT SIDE: ICD-10-CM

## 2023-05-29 DIAGNOSIS — H91.93 BILATERAL HEARING LOSS, UNSPECIFIED HEARING LOSS TYPE: ICD-10-CM

## 2023-05-29 DIAGNOSIS — E78.5 HYPERLIPIDEMIA, UNSPECIFIED HYPERLIPIDEMIA TYPE: ICD-10-CM

## 2023-05-29 DIAGNOSIS — Z00.00 ROUTINE HEALTH MAINTENANCE: Primary | ICD-10-CM

## 2023-05-29 DIAGNOSIS — I10 HTN (HYPERTENSION), BENIGN: ICD-10-CM

## 2023-05-29 DIAGNOSIS — R53.83 FATIGUE, UNSPECIFIED TYPE: Primary | ICD-10-CM

## 2023-05-29 DIAGNOSIS — M85.80 OSTEOPENIA, UNSPECIFIED LOCATION: ICD-10-CM

## 2023-05-29 DIAGNOSIS — Z00.00 ROUTINE GENERAL MEDICAL EXAMINATION AT A HEALTH CARE FACILITY: ICD-10-CM

## 2023-05-29 DIAGNOSIS — Z78.0 ASYMPTOMATIC POSTMENOPAUSAL STATUS: ICD-10-CM

## 2023-05-29 DIAGNOSIS — R73.09 OTHER ABNORMAL GLUCOSE: ICD-10-CM

## 2023-05-29 DIAGNOSIS — G47.09 OTHER INSOMNIA: ICD-10-CM

## 2023-05-29 LAB
ALBUMIN SERPL BCP-MCNC: 4 G/DL (ref 3.5–5.2)
ALP SERPL-CCNC: 79 U/L (ref 55–135)
ALT SERPL W/O P-5'-P-CCNC: 38 U/L (ref 10–44)
ANION GAP SERPL CALC-SCNC: 11 MMOL/L (ref 8–16)
AST SERPL-CCNC: 28 U/L (ref 10–40)
BILIRUB SERPL-MCNC: 0.7 MG/DL (ref 0.1–1)
BUN SERPL-MCNC: 15 MG/DL (ref 8–23)
CALCIUM SERPL-MCNC: 9.2 MG/DL (ref 8.7–10.5)
CHLORIDE SERPL-SCNC: 106 MMOL/L (ref 95–110)
CHOLEST SERPL-MCNC: 156 MG/DL (ref 120–199)
CHOLEST/HDLC SERPL: 2.6 {RATIO} (ref 2–5)
CO2 SERPL-SCNC: 24 MMOL/L (ref 23–29)
CREAT SERPL-MCNC: 1 MG/DL (ref 0.5–1.4)
ERYTHROCYTE [DISTWIDTH] IN BLOOD BY AUTOMATED COUNT: 13.4 % (ref 11.5–14.5)
EST. GFR  (NO RACE VARIABLE): >60 ML/MIN/1.73 M^2
ESTIMATED AVG GLUCOSE: 105 MG/DL (ref 68–131)
GLUCOSE SERPL-MCNC: 101 MG/DL (ref 70–110)
HBA1C MFR BLD: 5.3 % (ref 4–5.6)
HCT VFR BLD AUTO: 39.7 % (ref 37–48.5)
HDLC SERPL-MCNC: 59 MG/DL (ref 40–75)
HDLC SERPL: 37.8 % (ref 20–50)
HGB BLD-MCNC: 13 G/DL (ref 12–16)
LDLC SERPL CALC-MCNC: 76.8 MG/DL (ref 63–159)
MCH RBC QN AUTO: 30.1 PG (ref 27–31)
MCHC RBC AUTO-ENTMCNC: 32.7 G/DL (ref 32–36)
MCV RBC AUTO: 92 FL (ref 82–98)
NONHDLC SERPL-MCNC: 97 MG/DL
PLATELET # BLD AUTO: 265 K/UL (ref 150–450)
PMV BLD AUTO: 10.2 FL (ref 9.2–12.9)
POTASSIUM SERPL-SCNC: 3.8 MMOL/L (ref 3.5–5.1)
PROT SERPL-MCNC: 7.3 G/DL (ref 6–8.4)
RBC # BLD AUTO: 4.32 M/UL (ref 4–5.4)
SODIUM SERPL-SCNC: 141 MMOL/L (ref 136–145)
TRIGL SERPL-MCNC: 101 MG/DL (ref 30–150)
TSH SERPL DL<=0.005 MIU/L-ACNC: 3.32 UIU/ML (ref 0.4–4)
WBC # BLD AUTO: 6.78 K/UL (ref 3.9–12.7)

## 2023-05-29 PROCEDURE — 80061 LIPID PANEL: CPT | Performed by: FAMILY MEDICINE

## 2023-05-29 PROCEDURE — 84443 ASSAY THYROID STIM HORMONE: CPT | Performed by: FAMILY MEDICINE

## 2023-05-29 PROCEDURE — 99397 PER PM REEVAL EST PAT 65+ YR: CPT | Mod: S$PBB,GZ,, | Performed by: FAMILY MEDICINE

## 2023-05-29 PROCEDURE — 93005 ELECTROCARDIOGRAM TRACING: CPT

## 2023-05-29 PROCEDURE — 99215 OFFICE O/P EST HI 40 MIN: CPT | Mod: PBBFAC,25 | Performed by: FAMILY MEDICINE

## 2023-05-29 PROCEDURE — 83036 HEMOGLOBIN GLYCOSYLATED A1C: CPT | Performed by: FAMILY MEDICINE

## 2023-05-29 PROCEDURE — 93010 EKG 12-LEAD: ICD-10-PCS | Mod: ,,, | Performed by: INTERNAL MEDICINE

## 2023-05-29 PROCEDURE — 90677 PCV20 VACCINE IM: CPT | Mod: PBBFAC

## 2023-05-29 PROCEDURE — 93010 ELECTROCARDIOGRAM REPORT: CPT | Mod: ,,, | Performed by: INTERNAL MEDICINE

## 2023-05-29 PROCEDURE — 99397 PR PREVENTIVE VISIT,EST,65 & OVER: ICD-10-PCS | Mod: S$PBB,GZ,, | Performed by: FAMILY MEDICINE

## 2023-05-29 PROCEDURE — 85027 COMPLETE CBC AUTOMATED: CPT | Performed by: FAMILY MEDICINE

## 2023-05-29 PROCEDURE — 99999 PR PBB SHADOW E&M-EST. PATIENT-LVL V: CPT | Mod: PBBFAC,,, | Performed by: FAMILY MEDICINE

## 2023-05-29 PROCEDURE — 99999 PR PBB SHADOW E&M-EST. PATIENT-LVL V: ICD-10-PCS | Mod: PBBFAC,,, | Performed by: FAMILY MEDICINE

## 2023-05-29 PROCEDURE — 80053 COMPREHEN METABOLIC PANEL: CPT | Performed by: FAMILY MEDICINE

## 2023-05-29 RX ORDER — ZOLPIDEM TARTRATE 5 MG/1
5 TABLET ORAL NIGHTLY PRN
Qty: 30 TABLET | Refills: 1 | Status: SHIPPED | OUTPATIENT
Start: 2023-05-29 | End: 2023-05-29 | Stop reason: SDUPTHER

## 2023-05-29 RX ORDER — ZOLPIDEM TARTRATE 5 MG/1
5 TABLET ORAL NIGHTLY PRN
Qty: 30 TABLET | Refills: 2 | Status: SHIPPED | OUTPATIENT
Start: 2023-05-29

## 2023-05-29 NOTE — PATIENT INSTRUCTIONS
Shingrix new shingles vaccine  via a pharmacy (shot #1 and 8 weeks later shot #2)    Tetanus/whooping cough vaccine via pharmacy    Recommend covid booster via pharmacy

## 2023-06-06 ENCOUNTER — PATIENT MESSAGE (OUTPATIENT)
Dept: OTOLARYNGOLOGY | Facility: CLINIC | Age: 68
End: 2023-06-06
Payer: MEDICARE

## 2023-06-06 ENCOUNTER — TELEPHONE (OUTPATIENT)
Dept: OTOLARYNGOLOGY | Facility: CLINIC | Age: 68
End: 2023-06-06
Payer: MEDICARE

## 2023-06-06 NOTE — TELEPHONE ENCOUNTER
----- Message from Reginald Blanca sent at 6/6/2023 11:34 AM CDT -----  Contact: self  Pt is asking for an return call in reference to rescheduling apt for an different day , pt states she canceled apt on my chart ,please call back at [phone] Thx CJ

## 2023-06-12 NOTE — PROGRESS NOTES
Subjective:      Patient ID: Kacy Blackwell is a 67 y.o. female.    Chief Complaint: Annual Exam    HPI exec health  Bp no longer needing nephrol    Mom  at 99      Retired btu stil helps others      Still r hip pain s/p injxn 2-21itus; flares when goes up down ladder  injfxn no help .  They rec PT iof no better ;intermit not steady    C/o gradual hearing loss no pain tno tin  Past Medical History:   Diagnosis Date    Anticoagulant long-term use     aspirin 81mg    Chronic cough     controlled w flonase/zyrtec    Encounter for blood transfusion     Hyperaldosteronism     dr cummings/marcello    Hyperlipidemia     Hypertension     Hypokalemia     Osteopenia     Seasonal allergies       Past Surgical History:   Procedure Laterality Date    COLONOSCOPY N/A 2021    Procedure: COLONOSCOPY;  Surgeon: Kalani Quiroz MD;  Location[facility];  Service: Endoscopy;  Laterality: N/A;    COLONOSCOPY N/A 2022    Procedure: COLONOSCOPY;  Surgeon: Kalani Quiroz MD;  Location: [facility] ENDO;  Service: Endoscopy;  Laterality: N/A;    cyst removed from head      HYSTERECTOMY      cervix absent      Social History     Socioeconomic History    Marital status:    Tobacco Use    Smoking status: Never    Smokeless tobacco: Never   Substance and Sexual Activity    Alcohol use: Yes     Comment: rare social drinker    Drug use: No    Sexual activity: Not Currently     Partners: Male   Social History Narrative    . 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   Constitutional:       General: She is not in acute distress.     Appearance: She is well-developed.   HENT:      Head: Atraumatic.      Right Ear: External ear normal.      Left Ear: External ear normal.      Nose: Nose normal.      Mouth/Throat:      Pharynx: No oropharyngeal exudate.   Eyes:      General: No scleral icterus.     Conjunctiva/sclera: Conjunctivae normal.      Pupils: Pupils are equal, round, and reactive to light.   Neck:      Thyroid: No thyromegaly.   Cardiovascular:      Rate and Rhythm: Normal rate and regular rhythm.      Heart sounds: Normal heart sounds. No murmur heard.  Pulmonary:      Effort: Pulmonary effort is normal. No respiratory distress.      Breath sounds: Normal breath sounds. No wheezing or rales.   Abdominal:      General: Bowel sounds are normal. There is no distension.      Palpations: Abdomen is soft. There is no mass.      Tenderness: There is no abdominal tenderness. There is no guarding or rebound.   Musculoskeletal:         General: No tenderness. Normal range of motion.      Cervical back: Normal range of motion and neck supple.      Comments: She pi0nts to r butck and some pain  r ant though.    Lymphadenopathy:      Cervical: No cervical adenopathy.   Skin:     General: Skin is warm.      Coloration: Skin is not pale.      Findings: No erythema or rash.   Neurological:      Mental Status: She is alert and oriented to person, place, and time.      Cranial Nerves: No cranial nerve deficit.      Motor: No abnormal muscle tone.      Coordination: Coordination normal.   Psychiatric:         Behavior: Behavior normal.         Thought Content: Thought content normal.         Judgment: Judgment normal.     Assessment:         ICD-10-CM ICD-9-CM   1. Routine health maintenance   Z00.00 V70.0   2. Other insomnia  G47.09 780.52   3. Asymptomatic postmenopausal status  Z78.0 V49.81   4. Hyperlipidemia, unspecified hyperlipidemia type  E78.5 272.4   5. HTN (hypertension), benign  I10 401.1   6. Piriformis syndrome of right side  G57.01 355.0   7. Bilateral hearing loss, unspecified hearing loss type  H91.93 389.9   8. Osteopenia, unspecified location  M85.80 733.90      Plan:    Exerc;per  ifPt no  better then f/u ortho    1. Routine health maintenance    2. Other insomnia  -     Discontinue: zolpidem (AMBIEN) 5 MG Tab; Take 1 tablet (5 mg total) by mouth nightly as needed.  Dispense: 30 tablet; Refill: 1  -     zolpidem (AMBIEN) 5 MG Tab; Take 1 tablet (5 mg total) by mouth nightly as needed.  Dispense: 30 tablet; Refill: 2    3. Asymptomatic postmenopausal status  -     DXA Bone Density Axial Skeleton 1 or more sites; Future; Expected date: 05/29/2023    4. Hyperlipidemia, unspecified hyperlipidemia type  -     Lipids Digital Medicine (LDMP) Enrollment Order    5. HTN (hypertension), benign  -     Hypertension Digital Medicine (HDMP) Enrollment Order    6. Piriformis syndrome of right side  -     Ambulatory referral/consult to Physical/Occupational Therapy; Future; Expected date: 06/05/2023    7. Bilateral hearing loss, unspecified hearing loss type  -     Ambulatory referral/consult to Audiology; Future; Expected date: 06/05/2023  -     Ambulatory referral/consult to ENT; Future; Expected date: 06/05/2023    8. Osteopenia, unspecified location    Other orders  -     Lipids Digital Medicine (LDMP): Assign Onboarding Questionnaires  -     (In Office Administered) Pneumococcal Conjugate Vaccine (20 Valent) (IM)

## 2023-06-15 ENCOUNTER — PATIENT MESSAGE (OUTPATIENT)
Dept: INTERNAL MEDICINE | Facility: CLINIC | Age: 68
End: 2023-06-15
Payer: MEDICARE

## 2023-08-10 NOTE — DISCHARGE INSTRUCTIONS
Patient Education       Colon Polypectomy Discharge Instructions   About this topic   The colon is also called the large intestine. It is a long, hollow tube at the end of your digestive tract. It absorbs water from solid waste and changes it from liquid to a solid bowel movement.  A colon polyp is a growth of extra tissue that is not normally in your colon. Colon polyps do not often cause any signs. Most colon polyps are not cancer, but some polyps may turn into cancer. The doctor takes the polyps out during a procedure called a colonoscopy and sends them to the lab for a check to make sure there is no cancer.  You may have a colon polyp or multiple polyps removed. The doctor will send them to the lab to see what type they are. If a polyp is very large, it may need to be removed by surgery.     What care is needed at home?   · Ask your doctor what you need to do when you go home. Make sure you ask questions if you do not understand what the doctor says.  · Do not drive for 24 hours after a colonoscopy.  · Take your drugs as ordered by your doctor.  · Go back to your normal diet unless your doctor has told you to make some changes in your diet.  · Rest  What follow-up care is needed?   · Your doctor may ask you to make visits to the office to check on your progress. Be sure to keep these visits.  · Your doctor may suggest you get tested regularly. People with colon polyps need to have a colonoscopy regularly to check for the growth of new polyps.  · Some polyps may not be removed and more surgery may be needed.  · The results of the polyp testing will be given to you at one of these visits.  What drugs may be needed?   The doctor may order drugs to:  · Prevent hard stools  · Help with pain  · Reduce your risk of colon polyps or colon cancer  Will physical activity be limited?   You may feel sleepy after the colonoscopy. Try to get some rest.  What can be done to prevent this health problem?   · Have regular  colonoscopies.  · Eat foods high in fiber.  · Eat foods low in fat.  · Limit your intake of beer, wine, and mixed drinks (alcohol).  · Ask your doctor or dietitian for a diet that is right for you. Include calcium in your diet. Good sources of calcium include milk, cheese, and yogurt.  When do I need to call the doctor?   · Signs of infection. These include a fever of 100.4°F (38°C) or higher, chills, and anal itching or pain.  · Bleeding from rectum that gets worse  · Belly becomes swollen and sore  · Upset stomach and throwing up continues after you return home  · Not being able to move your bowels  · Weight loss without trying  · Blood in your stool  Teach Back: Helping You Understand   The Teach Back Method helps you understand the information we are giving you. After you talk with the staff, tell them in your own words what you learned. This helps to make sure the staff has described each thing clearly. It also helps to explain things that may have been confusing. Before going home, make sure you can do these:  · I can tell you about my condition.  · I can tell you what changes I need to make with my diet.  · I can tell you what I will do if my stomach is swollen, I have belly pain, or there is blood in my stool.  Where can I learn more?   [facility]  https://www.betterhealth.[place].gov.au/health/ConditionsAndTreatments/colonoscopy   NHS  https://www.nhs.uk/conditions/bowel-polyps/   UpToDate  https://www.uptodate.com/contents/colon-polyps-beyond-the-basics   Last Reviewed Date   2021-03-10  Consumer Information Use and Disclaimer   This information is not specific medical advice and does not replace information you receive from your health care provider. This is only a brief summary of general information. It does NOT include all information about conditions, illnesses, injuries, tests, procedures, treatments, therapies, discharge instructions or life-style choices that may apply to you. You must talk with your  health care provider for complete information about your health and treatment options. This information should not be used to decide whether or not to accept your health care providers advice, instructions or recommendations. Only your health care provider has the knowledge and training to provide advice that is right for you.  Copyright   Copyright © 2021 [employer] and its affiliates and/or licensors. All rights reserved.     71 yo M presented to the ED for pruritis. no rash. will give steroids, hydrocortisone cream and update tetanus

## 2023-08-23 DIAGNOSIS — I10 HTN (HYPERTENSION), BENIGN: ICD-10-CM

## 2023-08-24 DIAGNOSIS — I10 HTN (HYPERTENSION), BENIGN: ICD-10-CM

## 2023-08-24 RX ORDER — METOPROLOL SUCCINATE 100 MG/1
100 TABLET, EXTENDED RELEASE ORAL
Qty: 90 TABLET | Refills: 3 | Status: SHIPPED | OUTPATIENT
Start: 2023-08-24

## 2023-08-24 NOTE — TELEPHONE ENCOUNTER
Refill Decision Note   Kacy Blackwell  is requesting a refill authorization.  Brief Assessment and Rationale for Refill:  Approve     Medication Therapy Plan:         Comments:     Note composed:9:23 AM 08/24/2023

## 2023-08-24 NOTE — TELEPHONE ENCOUNTER
No care due was identified.  [facility] Embedded Care Due Messages. Reference number: [id number].   8/24/2023 9:20:20 AM CDT

## 2023-08-24 NOTE — TELEPHONE ENCOUNTER
No care due was identified.  [facility] Embedded Care Due Messages. Reference number: [id number].   8/23/2023 8:39:50 PM CDT

## 2023-08-28 RX ORDER — METOPROLOL SUCCINATE 100 MG/1
100 TABLET, EXTENDED RELEASE ORAL DAILY
Qty: 90 TABLET | Refills: 4 | Status: SHIPPED | OUTPATIENT
Start: 2023-08-28

## 2023-10-06 RX ORDER — ROSUVASTATIN CALCIUM 5 MG/1
5 TABLET, COATED ORAL NIGHTLY
Qty: 90 TABLET | Refills: 2 | Status: SHIPPED | OUTPATIENT
Start: 2023-10-06

## 2023-10-06 NOTE — TELEPHONE ENCOUNTER
Refill Decision Note   Kacy Blackwell  is requesting a refill authorization.  Brief Assessment and Rationale for Refill:  Approve     Medication Therapy Plan:         Comments:     Note composed:5:57 PM 10/06/2023

## 2023-10-06 NOTE — TELEPHONE ENCOUNTER
No care due was identified.  [facility] Embedded Care Due Messages. Reference number: [id number].   10/06/2023 12:34:04 PM CDT

## 2023-11-28 ENCOUNTER — APPOINTMENT (OUTPATIENT)
Dept: RADIOLOGY | Facility: HOSPITAL | Age: 68
End: 2023-11-28
Attending: FAMILY MEDICINE
Payer: MEDICARE

## 2023-11-28 DIAGNOSIS — Z78.0 ASYMPTOMATIC POSTMENOPAUSAL STATUS: ICD-10-CM

## 2023-11-28 PROCEDURE — 77080 DXA BONE DENSITY AXIAL SKELETON 1 OR MORE SITES: ICD-10-PCS | Mod: 26,,, | Performed by: RADIOLOGY

## 2023-11-28 PROCEDURE — 77080 DXA BONE DENSITY AXIAL: CPT | Mod: TC

## 2023-11-28 PROCEDURE — 77080 DXA BONE DENSITY AXIAL: CPT | Mod: 26,,, | Performed by: RADIOLOGY

## 2023-12-04 ENCOUNTER — PATIENT MESSAGE (OUTPATIENT)
Dept: INTERNAL MEDICINE | Facility: CLINIC | Age: 68
End: 2023-12-04
Payer: MEDICARE

## 2024-01-10 ENCOUNTER — HOSPITAL ENCOUNTER (OUTPATIENT)
Dept: RADIOLOGY | Facility: HOSPITAL | Age: 69
Discharge: HOME | End: 2024-01-10
Attending: FAMILY MEDICINE
Payer: MEDICARE

## 2024-01-10 VITALS — BODY MASS INDEX: 26.06 KG/M2 | HEIGHT: 63 IN | WEIGHT: 147.06 LBS

## 2024-01-10 DIAGNOSIS — Z12.31 ENCOUNTER FOR SCREENING MAMMOGRAM FOR BREAST CANCER: ICD-10-CM

## 2024-01-10 PROCEDURE — 77067 SCR MAMMO BI INCL CAD: CPT | Mod: TC

## 2024-01-10 PROCEDURE — 77067 SCR MAMMO BI INCL CAD: CPT | Mod: 26,,, | Performed by: RADIOLOGY

## 2024-01-10 PROCEDURE — 77063 BREAST TOMOSYNTHESIS BI: CPT | Mod: 26,,, | Performed by: RADIOLOGY

## 2024-01-30 DIAGNOSIS — Z00.00 ROUTINE GENERAL MEDICAL EXAMINATION AT A HEALTH CARE FACILITY: Primary | ICD-10-CM

## 2024-02-03 DIAGNOSIS — E87.6 HYPOKALEMIA: ICD-10-CM

## 2024-02-03 RX ORDER — POTASSIUM CHLORIDE 20 MEQ/1
20 TABLET, EXTENDED RELEASE ORAL 2 TIMES DAILY
Qty: 180 TABLET | Refills: 1 | Status: SHIPPED | OUTPATIENT
Start: 2024-02-03

## 2024-02-03 NOTE — TELEPHONE ENCOUNTER
No care due was identified.  [facility] Embedded Care Due Messages. Reference number: [id number].   2/03/2024 2:13:14 AM CST

## 2024-02-04 NOTE — TELEPHONE ENCOUNTER
Refill Decision Note   Kacy Blackwell  is requesting a refill authorization.  Brief Assessment and Rationale for Refill:  Approve     Medication Therapy Plan:         Comments:     Note composed:11:49 PM 02/03/2024

## 2024-03-14 DIAGNOSIS — I10 HTN (HYPERTENSION), BENIGN: ICD-10-CM

## 2024-03-15 RX ORDER — AMILORIDE HYDROCHLORIDE 5 MG/1
10 TABLET ORAL DAILY
Qty: 60 TABLET | Refills: 11 | Status: SHIPPED | OUTPATIENT
Start: 2024-03-15 | End: 2025-03-15

## 2024-04-17 RX ORDER — AMLODIPINE BESYLATE 2.5 MG/1
2.5 TABLET ORAL DAILY
Qty: 90 TABLET | Refills: 0 | Status: SHIPPED | OUTPATIENT
Start: 2024-04-17

## 2024-04-17 NOTE — TELEPHONE ENCOUNTER
Care Due:                  Date            Visit Type   Department     Provider  --------------------------------------------------------------------------------                                ADMIT                               REVIEW EXEC   [facility] INTERNAL  Last Visit: 05-      CHECK        MEDICINE       Gurinder Pierce                              ADMIT                               REVIEW EXEC   [facility] INTERNAL  Next Visit: 05-      CHECK        MEDICINE       Gurinder Pierce                                                            Last  Test          Frequency    Reason                     Performed    Due Date  --------------------------------------------------------------------------------    CMP.........  12 months..  aMILoride, potassium,      05- 05-                             rosuvastatin.............    Lipid Panel.  12 months..  rosuvastatin.............  05- 05-    [facility] Embedded Care Due Messages. Reference number: [id number].   4/17/2024 9:58:58 AM CDT

## 2024-04-17 NOTE — TELEPHONE ENCOUNTER
Spoke with the patient informing her that her refill request will be sent to the refill department for approval.

## 2024-04-17 NOTE — TELEPHONE ENCOUNTER
----- Message from Romelia York sent at 4/17/2024  1:16 PM CDT -----  Contact: [phone]  [facility] called in requesting to refill amLODIPine (NORVASC) 2.5 MG tablet, please call back [phone]                [id number] at [address] AT [address]  [facility] [address]  Phone: [phone] Fax: [phone]

## 2024-04-18 NOTE — TELEPHONE ENCOUNTER
Provider Staff:  Action required for this patient    Requires labs      Please see care gap opportunities below in Care Due Message.    Thanks!  [facility]     Appointments      Date Provider   Last Visit   5/29/2023 Gurinder Pierce MD   Next Visit   5/30/2024 Gurinder Pierce MD     Refill Decision Note   Kacy Blackwell  is requesting a refill authorization.  Brief Assessment and Rationale for Refill:  Approve     Medication Therapy Plan:         Comments:     Note composed:10:28 PM 04/17/2024

## 2024-04-22 ENCOUNTER — PATIENT MESSAGE (OUTPATIENT)
Dept: ADMINISTRATIVE | Facility: OTHER | Age: 69
End: 2024-04-22
Payer: MEDICARE

## 2024-05-30 ENCOUNTER — CLINICAL SUPPORT (OUTPATIENT)
Dept: INTERNAL MEDICINE | Facility: CLINIC | Age: 69
End: 2024-05-30
Payer: MEDICARE

## 2024-05-30 ENCOUNTER — HOSPITAL ENCOUNTER (OUTPATIENT)
Dept: CARDIOLOGY | Facility: HOSPITAL | Age: 69
Discharge: HOME | End: 2024-05-30
Payer: MEDICARE

## 2024-05-30 ENCOUNTER — OFFICE VISIT (OUTPATIENT)
Dept: INTERNAL MEDICINE | Facility: CLINIC | Age: 69
End: 2024-05-30
Payer: MEDICARE

## 2024-05-30 ENCOUNTER — TELEPHONE (OUTPATIENT)
Dept: PAIN MEDICINE | Facility: CLINIC | Age: 69
End: 2024-05-30
Payer: MEDICARE

## 2024-05-30 VITALS
BODY MASS INDEX: 27.34 KG/M2 | HEART RATE: 74 BPM | SYSTOLIC BLOOD PRESSURE: 123 MMHG | WEIGHT: 154.31 LBS | HEIGHT: 63 IN | DIASTOLIC BLOOD PRESSURE: 77 MMHG | TEMPERATURE: 97 F

## 2024-05-30 DIAGNOSIS — M54.31 SCIATICA OF RIGHT SIDE: ICD-10-CM

## 2024-05-30 DIAGNOSIS — Z00.00 ROUTINE HEALTH MAINTENANCE: Primary | ICD-10-CM

## 2024-05-30 DIAGNOSIS — Z00.00 ROUTINE GENERAL MEDICAL EXAMINATION AT A HEALTH CARE FACILITY: ICD-10-CM

## 2024-05-30 DIAGNOSIS — R53.83 FATIGUE, UNSPECIFIED TYPE: ICD-10-CM

## 2024-05-30 DIAGNOSIS — I10 HTN (HYPERTENSION), BENIGN: ICD-10-CM

## 2024-05-30 DIAGNOSIS — R53.0 NEOPLASTIC (MALIGNANT) RELATED FATIGUE: ICD-10-CM

## 2024-05-30 DIAGNOSIS — M54.9 DORSALGIA, UNSPECIFIED: ICD-10-CM

## 2024-05-30 DIAGNOSIS — M85.80 OSTEOPENIA, UNSPECIFIED LOCATION: ICD-10-CM

## 2024-05-30 DIAGNOSIS — R73.09 OTHER ABNORMAL GLUCOSE: ICD-10-CM

## 2024-05-30 DIAGNOSIS — E78.5 HYPERLIPIDEMIA, UNSPECIFIED HYPERLIPIDEMIA TYPE: Primary | ICD-10-CM

## 2024-05-30 DIAGNOSIS — R79.9 ABNORMAL BLOOD CHEMISTRY: ICD-10-CM

## 2024-05-30 LAB
ALBUMIN SERPL BCP-MCNC: 3.8 G/DL (ref 3.5–5.2)
ALP SERPL-CCNC: 71 U/L (ref 55–135)
ALT SERPL W/O P-5'-P-CCNC: 21 U/L (ref 10–44)
ANION GAP SERPL CALC-SCNC: 9 MMOL/L (ref 8–16)
AST SERPL-CCNC: 22 U/L (ref 10–40)
BILIRUB SERPL-MCNC: 0.6 MG/DL (ref 0.1–1)
BUN SERPL-MCNC: 16 MG/DL (ref 8–23)
CALCIUM SERPL-MCNC: 9.7 MG/DL (ref 8.7–10.5)
CHLORIDE SERPL-SCNC: 105 MMOL/L (ref 95–110)
CHOLEST SERPL-MCNC: 177 MG/DL (ref 120–199)
CHOLEST/HDLC SERPL: 3.2 {RATIO} (ref 2–5)
CO2 SERPL-SCNC: 27 MMOL/L (ref 23–29)
CREAT SERPL-MCNC: 1 MG/DL (ref 0.5–1.4)
ERYTHROCYTE [DISTWIDTH] IN BLOOD BY AUTOMATED COUNT: 13.5 % (ref 11.5–14.5)
EST. GFR  (NO RACE VARIABLE): >60 ML/MIN/1.73 M^2
ESTIMATED AVG GLUCOSE: 111 MG/DL (ref 68–131)
GLUCOSE SERPL-MCNC: 109 MG/DL (ref 70–110)
HBA1C MFR BLD: 5.5 % (ref 4–5.6)
HCT VFR BLD AUTO: 39.3 % (ref 37–48.5)
HDLC SERPL-MCNC: 56 MG/DL (ref 40–75)
HDLC SERPL: 31.6 % (ref 20–50)
HGB BLD-MCNC: 12.9 G/DL (ref 12–16)
LDLC SERPL CALC-MCNC: 93.4 MG/DL (ref 63–159)
MCH RBC QN AUTO: 30.4 PG (ref 27–31)
MCHC RBC AUTO-ENTMCNC: 32.8 G/DL (ref 32–36)
MCV RBC AUTO: 93 FL (ref 82–98)
NONHDLC SERPL-MCNC: 121 MG/DL
OHS QRS DURATION: 76 MS
OHS QTC CALCULATION: 407 MS
PLATELET # BLD AUTO: 271 K/UL (ref 150–450)
PMV BLD AUTO: 10.2 FL (ref 9.2–12.9)
POTASSIUM SERPL-SCNC: 3.9 MMOL/L (ref 3.5–5.1)
PROT SERPL-MCNC: 7.2 G/DL (ref 6–8.4)
RBC # BLD AUTO: 4.24 M/UL (ref 4–5.4)
SODIUM SERPL-SCNC: 141 MMOL/L (ref 136–145)
TRIGL SERPL-MCNC: 138 MG/DL (ref 30–150)
TSH SERPL DL<=0.005 MIU/L-ACNC: 1.31 UIU/ML (ref 0.4–4)
WBC # BLD AUTO: 6.1 K/UL (ref 3.9–12.7)

## 2024-05-30 PROCEDURE — 99214 OFFICE O/P EST MOD 30 MIN: CPT | Mod: PBBFAC,25 | Performed by: FAMILY MEDICINE

## 2024-05-30 PROCEDURE — 84443 ASSAY THYROID STIM HORMONE: CPT | Performed by: FAMILY MEDICINE

## 2024-05-30 PROCEDURE — 80061 LIPID PANEL: CPT | Performed by: FAMILY MEDICINE

## 2024-05-30 PROCEDURE — 85027 COMPLETE CBC AUTOMATED: CPT | Performed by: FAMILY MEDICINE

## 2024-05-30 PROCEDURE — 99999 PR PBB SHADOW E&M-EST. PATIENT-LVL IV: CPT | Mod: PBBFAC,,, | Performed by: FAMILY MEDICINE

## 2024-05-30 PROCEDURE — 93010 ELECTROCARDIOGRAM REPORT: CPT | Mod: ,,, | Performed by: INTERNAL MEDICINE

## 2024-05-30 PROCEDURE — 83036 HEMOGLOBIN GLYCOSYLATED A1C: CPT | Performed by: FAMILY MEDICINE

## 2024-05-30 PROCEDURE — 80053 COMPREHEN METABOLIC PANEL: CPT | Performed by: FAMILY MEDICINE

## 2024-05-30 PROCEDURE — 99397 PER PM REEVAL EST PAT 65+ YR: CPT | Mod: S$PBB,GZ,, | Performed by: FAMILY MEDICINE

## 2024-05-30 PROCEDURE — 93005 ELECTROCARDIOGRAM TRACING: CPT

## 2024-05-30 NOTE — PROGRESS NOTES
Subjective:      Patient ID: Kacy Blackwell is a . female.    Chief Complaint: Annual Exam    HPI exec health  htnBp no longer needing nephrol  Osteopenia fall 23 not thin enoughto need med    Mom  at 99      Retired btu stil helps others    Cmpleted Pt last  for sciatica and has cont and worsened;no weakness. No b/b incont; about month ago pain extended to r heel. States not bad enough that she would want gpntin        Past Medical History:   Diagnosis Date    Anticoagulant long-term use     aspirin 81mg    Chronic cough     controlled w flonase/zyrtec    Encounter for blood transfusion     Hyperaldosteronism     dr cummings/marcello    Hyperlipidemia     Hypertension     Hypokalemia     Osteopenia     Seasonal allergies       Past Surgical History:   Procedure Laterality Date    COLONOSCOPY N/A 2021    Procedure: COLONOSCOPY;  Surgeon: Kalani Quiroz MD;  Location: [facility] ENDO;  Service: Endoscopy;  Laterality: N/A;    COLONOSCOPY N/A 2022    Procedure: COLONOSCOPY;  Surgeon: Kalani Quiroz MD;  Location: [facility] ENDO;  Service: Endoscopy;  Laterality: N/A;    cyst removed from head      HYSTERECTOMY      cervix absent      Social History     Socioeconomic History    Marital status:    Tobacco Use    Smoking status: Never    Smokeless tobacco: Never   Substance and Sexual Activity    Alcohol use: Yes     Comment: rare social drinker    Drug use: No    Sexual activity: Not Currently     Partners: Male   Social History Narrative    . 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   Constitutional:       General: She is not in acute distress.     Appearance: She is well-developed.   HENT:      Head: Atraumatic.      Right Ear: External ear normal.      Left Ear: External ear normal.      Nose: Nose normal.      Mouth/Throat:      Pharynx: No oropharyngeal exudate.   Eyes:      General: No scleral icterus.     Conjunctiva/sclera: Conjunctivae normal.      Pupils: Pupils are equal, round, and reactive to light.   Neck:      Thyroid: No thyromegaly.   Cardiovascular:      Rate and Rhythm: Normal rate and regular rhythm.      Heart sounds: Normal heart sounds. No murmur heard.  Pulmonary:      Effort: Pulmonary effort is normal. No respiratory distress.      Breath sounds: Normal breath sounds. No wheezing or rales.   Abdominal:      General: Bowel sounds are normal. There is no distension.      Palpations: Abdomen is soft. There is no mass.      Tenderness: There is no abdominal tenderness. There is no guarding or rebound.   Musculoskeletal:         General: No tenderness. Normal range of motion.      Cervical back: Normal range of motion and neck supple.      .    Lymphadenopathy:      Cervical: No cervical adenopathy.   Skin:     General: Skin is warm.      Coloration: Skin is not pale.      Findings: No erythema or rash.   Neurological:      Mental Status: She is alert and oriented to person, place, and time.      Cranial Nerves: No cranial nerve deficit.      Motor: No abnormal muscle tone.      Coordination: Coordination normal.   Psychiatric:         Behavior: Behavior normal.         Thought Content: Thought content normal.         Judgment: Judgment normal.     Motor 5/5 lower ext    Assessment:         ICD-10-CM ICD-9-CM   1. Routine health  maintenance  Z00.00 V70.0   2. Other insomnia  G47.09 780.52         4. Hyperlipidemia, unspecified hyperlipidemia type  E78.5 272.4   5. HTN (hypertension), benign  I10 401.1               8. Osteopenia, unspecified location  M85.80 733.90    R sciatica  Plan:    F/au annual exec health  Lab pnd  Please obtain the RSV vaccine via your pharmacy  Shingrix new shingles vaccine  via a pharmacy  Tetanus/whooping cough vaccine via pharmacy  Recommend covid booster via pharmacy  Cscope due early 25  Plans to call ent for hearing loss    Lettr nd    Routine health maintenance    HTN (hypertension), benign    Osteopenia, unspecified location    Dorsalgia, unspecified    Sciatica of right side  -     MRI Lumbar Spine Without Contrast; Future; Expected date: 05/30/2024  -     Ambulatory referral/consult to Pain Clinic; Future; Expected date: 06/06/2024

## 2024-05-30 NOTE — PATIENT INSTRUCTIONS
Please obtain the RSV vaccine via your pharmacy  Shingrix new shingles vaccine  via a pharmacy  Tetanus/whooping cough vaccine via pharmacy  Recommend covid booster via pharmacy

## 2024-05-30 NOTE — TELEPHONE ENCOUNTER
Attempt to reach patient to schedule appointment with one of our pain providers. The patient did not answer. Left voice message on patients voice box to call back at earliest convenience to schedule apt.     Stiven Diaz  Medical Assistant

## 2024-05-31 ENCOUNTER — TELEPHONE (OUTPATIENT)
Dept: PAIN MEDICINE | Facility: CLINIC | Age: 69
End: 2024-05-31
Payer: MEDICARE

## 2024-06-03 ENCOUNTER — TELEPHONE (OUTPATIENT)
Dept: PAIN MEDICINE | Facility: CLINIC | Age: 69
End: 2024-06-03
Payer: MEDICARE

## 2024-06-03 NOTE — TELEPHONE ENCOUNTER
----- Message from Clifton Adame sent at 5/31/2024  3:05 PM CDT -----  Contact: katie  Type:  Patient Returning Call    Who Called:katie  Who Left Message for Patient:Stiven Diaz MA  Does the patient know what this is regarding?:unknown  Would the patient rather a call back or a response via [facility]? Call back   Best Call Back Number:[phone]  Additional Information:

## 2024-06-03 NOTE — TELEPHONE ENCOUNTER
Reached out to patient to schedule appointment from messages. Apt has been made.   Pt understand. All questions answered.     Stiven Diaz  Medical Assistant

## 2024-06-06 NOTE — H&P (VIEW-ONLY)
"New Patient Chronic Pain Note (Initial Visit)    Referring Physician: Palak Travis    PCP: Gurinder Pierce MD    Chief Complaint:   Chief Complaint   Patient presents with    Leg Pain     Right     Low-back Pain        SUBJECTIVE:    Kacy Blackwell is a 68 y.o. female with past medical history significant for hypertension, hyperlipidemia, osteopenia who presents to the clinic for the evaluation of lower back and leg pain.  Patient reports she believes pain began October 2021 while she was hanging drapery." Patient reports pain which is constant which today is rated a 2/10.  Pain at its worst can be a 6/10.  Pain is described as a dull ache in nature.  She reports pain in the lower back which can radiate down the lateral and posterior aspect of the right lower extremity in L4-S1 distribution to the heel.  Patient denies left-sided radiculopathy.  Pain can be exacerbated by going up ladders." She does not believe prolonged standing or ambulation exacerbates her pain.  She can not recall any alleviating factors.  Patient has trialed antispasmodics by primary care provider, Dr. Trimble/Dr. Pierce in the past with ineffective relief.  She has never received interventional treatment.  She does report completing 12 weeks of conventional land-based physical therapy with dry needling July through September 2023 with no improvement in her pain.  She is scheduled for lumbar MRI later on today.    Patient denies night fever/night sweats, urinary incontinence, bowel incontinence, significant weight loss, significant motor weakness, and loss of sensations.    Pain Disability Index Review:         6/10/2024     8:20 AM   Last 3 PDI Scores   Pain Disability Index (PDI) 12       Non-Pharmacologic Treatments:  Physical Therapy/Home Exercise: yes  Ice/Heat:yes  TENS: no  Acupuncture: no  Massage: no  Chiropractic: no    Other: no      Pain Medications:  - Adjuvant Medications: Ambien (Zolpidem)  - Anti-Coagulants: " Aspirin    Pain Procedures:   None    Past Medical History:   Diagnosis Date    Anticoagulant long-term use     aspirin 81mg    Chronic cough     controlled w flonase/zyrtec    Encounter for blood transfusion     Hyperaldosteronism     dr cummings/marcello    Hyperlipidemia     Hypertension     Hypokalemia     Osteopenia     Seasonal allergies      Past Surgical History:   Procedure Laterality Date    COLONOSCOPY N/A 11/17/2021    Procedure: COLONOSCOPY;  Surgeon: Kalani Quiroz MD;  Location: [facility] ENDO;  Service: Endoscopy;  Laterality: N/A;    COLONOSCOPY N/A 2/18/2022    Procedure: COLONOSCOPY;  Surgeon: Kalani Quiroz MD;  Location: [facility] ENDO;  Service: Endoscopy;  Laterality: N/A;    cyst removed from head      HYSTERECTOMY  1980    cervix absent     Review of patient's allergies indicates:   Allergen Reactions    Diovan [valsartan]      angioedema       Current Outpatient Medications   Medication Sig    aMILoride (MIDAMOR) 5 MG Tab Take 2 tablets (10 mg total) by mouth once daily.    amLODIPine (NORVASC) 2.5 MG tablet Take 1 tablet (2.5 mg total) by mouth once daily.    aspirin (ECOTRIN) 81 MG EC tablet Take 81 mg by mouth once daily.    BIOTIN ORAL Take 10,000 mg by mouth once daily.     calcium carbonate-vit D3-min 600 mg calcium- 400 unit Tab Take 1 tablet by mouth once daily.    cetirizine (ZYRTEC) 10 MG tablet Take 10 mg by mouth once daily.    hydrOXYzine HCL (ATARAX) 25 MG tablet Take 1 tablet (25 mg total) by mouth nightly as needed for Itching (overnight skin sensation).    metoprolol succinate (TOPROL-XL) 100 MG 24 hr tablet Take 1 tablet (100 mg total) by mouth once daily.    metoprolol succinate (TOPROL-XL) 100 MG 24 hr tablet TAKE ONE TABLET BY MOUTH EVERY DAY    multivitamin capsule Take 1 capsule by mouth once daily.    multivitamin with minerals tablet Take 1 tablet by mouth once daily. Nutrafol for Women    potassium chloride SA (K-DUR,KLOR-CON) 20 MEQ tablet TAKE ONE TABLET  "BY MOUTH TWICE A DAY    rosuvastatin (CRESTOR) 5 MG tablet Take 1 tablet (5 mg total) by mouth every evening.    zolpidem (AMBIEN) 5 MG Tab Take 1 tablet (5 mg total) by mouth nightly as needed.     No current facility-administered medications for this visit.         ROS:  GENERAL:  No weight loss, malaise or fevers.  HEENT:   No recent changes in vision or hearing  NECK:  Negative for lumps, no difficulty with swallowing.  RESPIRATORY:  Negative for cough, wheezing or shortness of breath, patient denies any recent URI.  CARDIOVASCULAR:  Negative for chest pain or palpitations.  GI:  Negative for abdominal discomfort, blood in stools or black stools or change in bowel habits.  MUSCULOSKELETAL:  See HPI.  SKIN:  Negative for lesions, rash, and itching.  PSYCH:  No mood disorder or recent psychosocial stressors.   HEMATOLOGY/LYMPHOLOGY:  Negative for prolonged bleeding, bruising easily or swollen nodes.    NEURO:   No history of syncope, paralysis, seizures or tremors.  All other reviewed and negative other than HPI.    OBJECTIVE:    /79   Pulse 70   Resp 17   Ht 5' 3" (1.6 m)   Wt 71 kg (156 lb 8.4 oz)   LMP  (LMP Unknown)   BMI 27.73 kg/m²       Physical Exam:    GENERAL: Well appearing, in no acute distress, alert and oriented x3.  PSYCH:  Mood and affect appropriate.  SKIN: Skin color, texture, turgor normal, no rashes or lesions.  HEAD/FACE:  Normocephalic, atraumatic. Cranial nerves grossly intact.    CV: RRR with palpation of the radial artery.  PULM: No evidence of respiratory difficulty, symmetric chest rise.  GI:  Soft and non-tender.    BACK: Straight leg raising in the sitting and supine positions is negative to radicular pain. No pain to palpation over the facet joints of the lumbar spine or spinous processes. Normal range of motion without pain reproduction.  EXTREMITIES: Peripheral joint ROM is full and pain free without obvious instability or laxity in all four extremities. No deformities, " edema, or skin discoloration. Good capillary refill.  MUSCULOSKELETAL: Able to stand on heels & toes.   Shoulder, hip, and knee provocative maneuvers are negative.    SIJ testing: (RIGHT)  - TTP over SI joint: Present  - Alana's/ Mina's: Positive    - Sacroiliac Distraction Test (anterior pressure): Positive  - Sacroiliac Compression Test (lateral pressure): Positive   - SacralThrust Test (posterior pressure): Positive     Facet loading test is negative bilaterally.   Bilateral upper and lower extremity strength is normal and symmetric.  No atrophy or tone abnormalities are noted.    RIGHT Lower extremity: Hip flexion 5/5, Hip Abduction 5/5, Hip Adduction 5/5, Knee extension 5/5, Knee flexion 5/5, Ankle dorsiflexion5/5, Extensor hallucis longus 5/5, Ankle plantarflexion 5/5  LEFT Lower extremity:  Hip flexion 5/5, Hip Abduction 5/5,Hip Adduction 5/5, Knee extension 5/5, Knee flexion 5/5, Ankle dorsiflexion 5/5, Extensor hallucis longus 5/5, Ankle plantarflexion 5/5  -Normal testing knee (patellar) jerk and ankle (achilles) jerk    NEURO: Bilateral upper and lower extremity coordination and muscle stretch reflexes are physiologic and symmetric. No loss of sensation is noted.  GAIT: normal.    Imaging:  Lumbar MRI scheduled.        ASSESSMENT: 68 y.o. year old female with     1. Sacroiliac joint pain  Ambulatory referral/consult to Physical/Occupational Therapy      2. Lumbar radiculopathy  Ambulatory referral/consult to Pain Clinic    Ambulatory referral/consult to Physical/Occupational Therapy      3. Lumbar spondylosis  Ambulatory referral/consult to Physical/Occupational Therapy            PLAN:   - Interventions:  We discussed considering right-sided sacroiliac joint injection to address sacroiliitis and potential right-sided transforaminal epidural steroid injection to address lumbar radiculopathy.  We will 1st review lumbar MRI. Explained the risks and benefits of the procedure in detail with the patient  today in clinic along with alternative treatment options    - Anticoagulation use: Yes aspirin  --primary ppx     report:  Reviewed and consistent with medication use as prescribed.    - Medications:  -We discussed starting a membrane stabilizing agents such as gabapentin to see if this helps with neuropathic pain.  We have discussed potential side effects of this medication and mechanism of action.  Patient has deferred pharmacologic management at this time.    - Therapy:   We discussed initiating physical therapy to help manage the patient/s painful condition. The patient was counseled that muscle strengthening will improve the long term prognosis in regards to pain and may also help increase range of motion and mobility. They were told that one of the goals of physical therapy is that they learn how to do the exercises so that they can do them independently at home daily upon completion. The patient's questions were answered and they were agreeable to this course. A referral for INT physical therapy was provided to the patient.    - Imaging:  MRI lumbar spine to better evaluate pain      - Follow up visit: return to clinic in 4-6 weeks      The above plan and management options were discussed at length with patient. Patient is in agreement with the above and verbalized understanding.    - I discussed the goals of interventional chronic pain management with the patient on today's visit. We discussed a multimodal and systematic approach to pain.  This includes diagnostic and therapeutic injections, adjuvant pharmacologic treatment, physical therapy, and at times psychiatry.  I emphasized the importance of regular exercise, core strengthening and stretching, diet and weight loss as a cornerstone of long-term pain management.    - This condition does not require this patient to take time off of work, and the primary goal of our Pain Management services is to improve the patient's functional capacity.  - Patient  Questions: Answered all of the patient's questions regarding diagnoses, therapy, treatment and next steps        Fabian Macias MD  Interventional Pain Management  [facility]    Disclaimer:  This note was prepared using voice recognition system and is likely to have sound alike errors that may have been overlooked even after proof reading.  Please call me with any questions

## 2024-06-06 NOTE — PROGRESS NOTES
"New Patient Chronic Pain Note (Initial Visit)    Referring Physician: Palak Travis    PCP: Gurinder Pierce MD    Chief Complaint:   Chief Complaint   Patient presents with    Leg Pain     Right     Low-back Pain        SUBJECTIVE:    Kacy Blackwell is a 68 y.o. female with past medical history significant for hypertension, hyperlipidemia, osteopenia who presents to the clinic for the evaluation of lower back and leg pain.  Patient reports she believes pain began October 2021 while she was hanging drapery." Patient reports pain which is constant which today is rated a 2/10.  Pain at its worst can be a 6/10.  Pain is described as a dull ache in nature.  She reports pain in the lower back which can radiate down the lateral and posterior aspect of the right lower extremity in L4-S1 distribution to the heel.  Patient denies left-sided radiculopathy.  Pain can be exacerbated by going up ladders." She does not believe prolonged standing or ambulation exacerbates her pain.  She can not recall any alleviating factors.  Patient has trialed antispasmodics by primary care provider, Dr. Trimble/Dr. Pierce in the past with ineffective relief.  She has never received interventional treatment.  She does report completing 12 weeks of conventional land-based physical therapy with dry needling July through September 2023 with no improvement in her pain.  She is scheduled for lumbar MRI later on today.    Patient denies night fever/night sweats, urinary incontinence, bowel incontinence, significant weight loss, significant motor weakness, and loss of sensations.    Pain Disability Index Review:         6/10/2024     8:20 AM   Last 3 PDI Scores   Pain Disability Index (PDI) 12       Non-Pharmacologic Treatments:  Physical Therapy/Home Exercise: yes  Ice/Heat:yes  TENS: no  Acupuncture: no  Massage: no  Chiropractic: no    Other: no      Pain Medications:  - Adjuvant Medications: Ambien (Zolpidem)  - Anti-Coagulants: " Aspirin    Pain Procedures:   None    Past Medical History:   Diagnosis Date    Anticoagulant long-term use     aspirin 81mg    Chronic cough     controlled w flonase/zyrtec    Encounter for blood transfusion     Hyperaldosteronism     dr cummings/marcello    Hyperlipidemia     Hypertension     Hypokalemia     Osteopenia     Seasonal allergies      Past Surgical History:   Procedure Laterality Date    COLONOSCOPY N/A 11/17/2021    Procedure: COLONOSCOPY;  Surgeon: Kalani Quiroz MD;  Location: [facility] ENDO;  Service: Endoscopy;  Laterality: N/A;    COLONOSCOPY N/A 2/18/2022    Procedure: COLONOSCOPY;  Surgeon: Kalani Quiroz MD;  Location: [facility] ENDO;  Service: Endoscopy;  Laterality: N/A;    cyst removed from head      HYSTERECTOMY  1980    cervix absent     Review of patient's allergies indicates:   Allergen Reactions    Diovan [valsartan]      angioedema       Current Outpatient Medications   Medication Sig    aMILoride (MIDAMOR) 5 MG Tab Take 2 tablets (10 mg total) by mouth once daily.    amLODIPine (NORVASC) 2.5 MG tablet Take 1 tablet (2.5 mg total) by mouth once daily.    aspirin (ECOTRIN) 81 MG EC tablet Take 81 mg by mouth once daily.    BIOTIN ORAL Take 10,000 mg by mouth once daily.     calcium carbonate-vit D3-min 600 mg calcium- 400 unit Tab Take 1 tablet by mouth once daily.    cetirizine (ZYRTEC) 10 MG tablet Take 10 mg by mouth once daily.    hydrOXYzine HCL (ATARAX) 25 MG tablet Take 1 tablet (25 mg total) by mouth nightly as needed for Itching (overnight skin sensation).    metoprolol succinate (TOPROL-XL) 100 MG 24 hr tablet Take 1 tablet (100 mg total) by mouth once daily.    metoprolol succinate (TOPROL-XL) 100 MG 24 hr tablet TAKE ONE TABLET BY MOUTH EVERY DAY    multivitamin capsule Take 1 capsule by mouth once daily.    multivitamin with minerals tablet Take 1 tablet by mouth once daily. Nutrafol for Women    potassium chloride SA (K-DUR,KLOR-CON) 20 MEQ tablet TAKE ONE TABLET  "BY MOUTH TWICE A DAY    rosuvastatin (CRESTOR) 5 MG tablet Take 1 tablet (5 mg total) by mouth every evening.    zolpidem (AMBIEN) 5 MG Tab Take 1 tablet (5 mg total) by mouth nightly as needed.     No current facility-administered medications for this visit.         ROS:  GENERAL:  No weight loss, malaise or fevers.  HEENT:   No recent changes in vision or hearing  NECK:  Negative for lumps, no difficulty with swallowing.  RESPIRATORY:  Negative for cough, wheezing or shortness of breath, patient denies any recent URI.  CARDIOVASCULAR:  Negative for chest pain or palpitations.  GI:  Negative for abdominal discomfort, blood in stools or black stools or change in bowel habits.  MUSCULOSKELETAL:  See HPI.  SKIN:  Negative for lesions, rash, and itching.  PSYCH:  No mood disorder or recent psychosocial stressors.   HEMATOLOGY/LYMPHOLOGY:  Negative for prolonged bleeding, bruising easily or swollen nodes.    NEURO:   No history of syncope, paralysis, seizures or tremors.  All other reviewed and negative other than HPI.    OBJECTIVE:    /79   Pulse 70   Resp 17   Ht 5' 3" (1.6 m)   Wt 71 kg (156 lb 8.4 oz)   LMP  (LMP Unknown)   BMI 27.73 kg/m²       Physical Exam:    GENERAL: Well appearing, in no acute distress, alert and oriented x3.  PSYCH:  Mood and affect appropriate.  SKIN: Skin color, texture, turgor normal, no rashes or lesions.  HEAD/FACE:  Normocephalic, atraumatic. Cranial nerves grossly intact.    CV: RRR with palpation of the radial artery.  PULM: No evidence of respiratory difficulty, symmetric chest rise.  GI:  Soft and non-tender.    BACK: Straight leg raising in the sitting and supine positions is negative to radicular pain. No pain to palpation over the facet joints of the lumbar spine or spinous processes. Normal range of motion without pain reproduction.  EXTREMITIES: Peripheral joint ROM is full and pain free without obvious instability or laxity in all four extremities. No deformities, " edema, or skin discoloration. Good capillary refill.  MUSCULOSKELETAL: Able to stand on heels & toes.   Shoulder, hip, and knee provocative maneuvers are negative.    SIJ testing: (RIGHT)  - TTP over SI joint: Present  - Alana's/ Mina's: Positive    - Sacroiliac Distraction Test (anterior pressure): Positive  - Sacroiliac Compression Test (lateral pressure): Positive   - SacralThrust Test (posterior pressure): Positive     Facet loading test is negative bilaterally.   Bilateral upper and lower extremity strength is normal and symmetric.  No atrophy or tone abnormalities are noted.    RIGHT Lower extremity: Hip flexion 5/5, Hip Abduction 5/5, Hip Adduction 5/5, Knee extension 5/5, Knee flexion 5/5, Ankle dorsiflexion5/5, Extensor hallucis longus 5/5, Ankle plantarflexion 5/5  LEFT Lower extremity:  Hip flexion 5/5, Hip Abduction 5/5,Hip Adduction 5/5, Knee extension 5/5, Knee flexion 5/5, Ankle dorsiflexion 5/5, Extensor hallucis longus 5/5, Ankle plantarflexion 5/5  -Normal testing knee (patellar) jerk and ankle (achilles) jerk    NEURO: Bilateral upper and lower extremity coordination and muscle stretch reflexes are physiologic and symmetric. No loss of sensation is noted.  GAIT: normal.    Imaging:  Lumbar MRI scheduled.        ASSESSMENT: 68 y.o. year old female with     1. Sacroiliac joint pain  Ambulatory referral/consult to Physical/Occupational Therapy      2. Lumbar radiculopathy  Ambulatory referral/consult to Pain Clinic    Ambulatory referral/consult to Physical/Occupational Therapy      3. Lumbar spondylosis  Ambulatory referral/consult to Physical/Occupational Therapy            PLAN:   - Interventions:  We discussed considering right-sided sacroiliac joint injection to address sacroiliitis and potential right-sided transforaminal epidural steroid injection to address lumbar radiculopathy.  We will 1st review lumbar MRI. Explained the risks and benefits of the procedure in detail with the patient  today in clinic along with alternative treatment options    - Anticoagulation use: Yes aspirin  --primary ppx     report:  Reviewed and consistent with medication use as prescribed.    - Medications:  -We discussed starting a membrane stabilizing agents such as gabapentin to see if this helps with neuropathic pain.  We have discussed potential side effects of this medication and mechanism of action.  Patient has deferred pharmacologic management at this time.    - Therapy:   We discussed initiating physical therapy to help manage the patient/s painful condition. The patient was counseled that muscle strengthening will improve the long term prognosis in regards to pain and may also help increase range of motion and mobility. They were told that one of the goals of physical therapy is that they learn how to do the exercises so that they can do them independently at home daily upon completion. The patient's questions were answered and they were agreeable to this course. A referral for INT physical therapy was provided to the patient.    - Imaging:  MRI lumbar spine to better evaluate pain      - Follow up visit: return to clinic in 4-6 weeks      The above plan and management options were discussed at length with patient. Patient is in agreement with the above and verbalized understanding.    - I discussed the goals of interventional chronic pain management with the patient on today's visit. We discussed a multimodal and systematic approach to pain.  This includes diagnostic and therapeutic injections, adjuvant pharmacologic treatment, physical therapy, and at times psychiatry.  I emphasized the importance of regular exercise, core strengthening and stretching, diet and weight loss as a cornerstone of long-term pain management.    - This condition does not require this patient to take time off of work, and the primary goal of our Pain Management services is to improve the patient's functional capacity.  - Patient  Questions: Answered all of the patient's questions regarding diagnoses, therapy, treatment and next steps        Fabian Macias MD  Interventional Pain Management  [facility]    Disclaimer:  This note was prepared using voice recognition system and is likely to have sound alike errors that may have been overlooked even after proof reading.  Please call me with any questions

## 2024-06-10 ENCOUNTER — HOSPITAL ENCOUNTER (OUTPATIENT)
Dept: RADIOLOGY | Facility: HOSPITAL | Age: 69
Discharge: HOME | End: 2024-06-10
Attending: FAMILY MEDICINE
Payer: MEDICARE

## 2024-06-10 ENCOUNTER — OFFICE VISIT (OUTPATIENT)
Dept: PAIN MEDICINE | Facility: CLINIC | Age: 69
End: 2024-06-10
Payer: MEDICARE

## 2024-06-10 VITALS
HEART RATE: 70 BPM | HEIGHT: 63 IN | BODY MASS INDEX: 27.73 KG/M2 | RESPIRATION RATE: 17 BRPM | WEIGHT: 156.5 LBS | SYSTOLIC BLOOD PRESSURE: 125 MMHG | DIASTOLIC BLOOD PRESSURE: 79 MMHG

## 2024-06-10 DIAGNOSIS — M54.31 SCIATICA OF RIGHT SIDE: ICD-10-CM

## 2024-06-10 DIAGNOSIS — M53.3 SACROILIAC JOINT PAIN: Primary | ICD-10-CM

## 2024-06-10 DIAGNOSIS — M47.816 LUMBAR SPONDYLOSIS: ICD-10-CM

## 2024-06-10 DIAGNOSIS — M54.16 LUMBAR RADICULOPATHY: ICD-10-CM

## 2024-06-10 PROCEDURE — 99215 OFFICE O/P EST HI 40 MIN: CPT | Mod: PBBFAC | Performed by: ANESTHESIOLOGY

## 2024-06-10 PROCEDURE — 72148 MRI LUMBAR SPINE W/O DYE: CPT | Mod: TC

## 2024-06-10 PROCEDURE — 99999 PR PBB SHADOW E&M-EST. PATIENT-LVL V: CPT | Mod: PBBFAC,,, | Performed by: ANESTHESIOLOGY

## 2024-06-10 PROCEDURE — 99204 OFFICE O/P NEW MOD 45 MIN: CPT | Mod: S$PBB,,, | Performed by: ANESTHESIOLOGY

## 2024-06-10 PROCEDURE — 72148 MRI LUMBAR SPINE W/O DYE: CPT | Mod: 26,,, | Performed by: RADIOLOGY

## 2024-06-10 NOTE — PATIENT INSTRUCTIONS
Exercises to Strengthen Your Lower Back  Strong lower back and abdominal muscles work together to support your spine. The exercises below will help strengthen the lower back. It is important that you begin exercising slowly and increase levels gradually.  Always begin any exercise program with stretching. If you feel pain while doing any of these exercises, stop and talk to your doctor about a more specific exercise program that better suits your condition.   Low back stretch  The point of stretching is to make you more flexible and increase your range of motion. Stretch only as much as you are able. Stretch slowly. Do not push your stretch to the limit. If at any point you feel pain while stretching, this is your (temporary) limit.  Lie on your back with your knees bent and both feet on the ground.  Slowly raise your left knee to your chest as you flatten your lower back against the floor. Hold for 5 seconds.  Relax and repeat the exercise with your right knee.  Do 10 of these exercises for each leg.  Repeat hugging both knees to your chest at the same time.  Building lower back strength  Start your exercise routine with 10 to 30 minutes a day, 1 to 3 times a day.  Initial exercises  Lying on your back:  Ankle pumps: Move your foot up and down, towards your head, and then away. Repeat 10 times with each foot.  Heel slides: Slowly bend your knee, drawing the heel of your foot towards you. Then slide your heel/foot from you, straightening your knee. Do not lift your foot off the floor (this is not a leg lift).  Abdominal contraction: Bend your knees and put your hands on your stomach. Tighten your stomach muscles. Hold for 5 seconds, then relax. Repeat 10 times.  Straight leg raise: Bend one leg at the knee and keep the other leg straight. Tighten your stomach muscles. Slowly lift your straight leg 6 to 12 inches off the floor and hold for up to 5 seconds. Repeat 10 times on each side.  Standing:  Wall squats:  Stand with your back against the wall. Move your feet about 12 inches away from the wall. Tighten your stomach muscles, and slowly bend your knees until they are at about a 45 degree angle. Do not go down too far. Hold about 5 seconds. Then slowly return to your starting position. Repeat 10 times.  Heel raises: Stand facing the wall. Slowly raise the heels of your feet up and down, while keeping your toes on the floor. If you have trouble balancing, you can touch the wall with your hands. Repeat 10 times.  More advanced exercises  When you feel comfortable enough, try these exercises.  Kneeling lumbar extension: Begin on your hands and knees. At the same time, raise and straighten your right arm and left leg until they are parallel to the ground. Hold for 2 seconds and come back slowly to a starting position. Repeat with left arm and right leg, alternating 10 times.  Prone lumbar extension: Lie face down, arms extended overhead, palms on the floor. At the same time, raise your right arm and left leg as high as comfortably possible. Hold for 10 seconds and slowly return to start. Repeat with left arm and right leg, alternating 10 times. Gradually build up to 20 times. (Advanced: Repeat this exercise raising both arms and both legs a few inches off the floor at the same time. Hold for 5 seconds and release.)  Pelvic tilt: Lie on the floor on your back with your knees bent at 90 degrees. Your feet should be flat on the floor. Inhale, exhale, then slowly contract your abdominal muscles bringing your navel toward your spine. Let your pelvis rock back until your lower back is flat on the floor. Hold for 10 seconds while breathing smoothly.  Abdominal crunch: Perform a pelvic tilt (above) flattening your lower back against the floor. Holding the tension in your abdominal muscles, take another breath and raise your shoulder blades off the ground (this is not a full sit-up). Keep your head in line with your body (dont bend  your neck forward). Hold for 2 seconds, then slowly lower.  Date Last Reviewed: 6/1/2016  © 1885-3015 [employer]. [address] [place] [address], [place] [address]. All rights reserved. This information is not intended as a substitute for professional medical care. Always follow your healthcare professional's instructions.       Understanding Lumbar Radiculopathy    Lumbar radiculopathy is irritation or inflammation of a nerve root in the low back. It causes symptoms that spread out from the back down one or both legs. To understand this condition, it helps to understand the parts of the spine:  Vertebrae. These are bones that stack to form the spine. The lumbar spine contains the 5 bottom vertebrae.  Disks. These are soft pads of tissue between the vertebrae. They act as shock absorbers for the spine.  Spinal canal. This is a tunnel formed within the stacked vertebrae. In the lumbar spine, nerves run through this canal.  Nerves. These branch off and leave the spinal canal, traveling out to parts of the body. As they leave the spinal canal, nerves pass through openings between the vertebrae. The nerve root is the part of the nerve that is closest to the spinal canal.  Sciatic nerve. This is a large nerve formed from several nerve roots in the low back. This nerve extends down the back of the leg to the foot.  With lumbar radiculopathy, nerve roots in the low back become irritated. This leads to pain and symptoms. The sciatic nerve is commonly involved, so the condition is often called sciatica.  What causes lumbar radiculopathy?  Aging, injury, poor posture, extra body weight, and other issues can lead to problems in the low back. These problems may then irritate nerve roots. They include:  Damage to a disk in the lumbar spine. The damaged disk may then press on nearby nerve roots.  Degeneration from wear and tear, and aging. This can lead to narrowing (stenosis) of the openings between the vertebrae. The  narrowed openings press on nerve roots as they leave the spinal canal.  Unstable spine. This is when a vertebra slips forward. It can then press on a nerve root.  Other, less common things can put pressure on nerves in the low back. These include diabetes, infection, or a tumor.  Symptoms of lumbar radiculopathy  These include:  Pain in the low back  Pain, numbness, tingling, or weakness that travels into the buttocks, hip, groin, or leg  Muscle spasms  Treatment for lumbar radiculopathy  In most cases, your healthcare provider will first try treatments that help relieve symptoms. These may include:  Prescription and over-the-counter pain medicines. These help relieve pain, swelling, and irritation.  Limits on positions and activities that increase pain. But lying in bed or avoiding all movement is only recommended for a short period of time.  Physical therapy, including exercises and stretches. This helps decrease pain and increase movement and function.  Steroid shots into the lower back. This may help relieve symptoms for a time.  Weight-loss program. If you are overweight, losing extra pounds may help relieve symptoms.  In some cases, you may need surgery to fix the underlying problem. This depends on the cause, the symptoms, and how long the pain has lasted.  Possible complications  Over time, an irritated and inflamed nerve may become damaged. This may lead to long-lasting (permanent) numbness or weakness in your legs and feet. If symptoms change suddenly or get worse, be sure to let your healthcare provider know.  When to call your healthcare provider  Call your healthcare provider right away if you have any of these:  New pain or pain that gets worse  New or increasing weakness, tingling, or numbness in your leg or foot  Problems controlling your bladder or bowel   Date Last Reviewed: 3/10/2016  © 7110-9749 [employer]. [address] [place] [address], [place] [address]. All rights reserved. This  information is not intended as a substitute for professional medical care. Always follow your healthcare professional's instructions.

## 2024-06-12 ENCOUNTER — PATIENT MESSAGE (OUTPATIENT)
Dept: PAIN MEDICINE | Facility: CLINIC | Age: 69
End: 2024-06-12
Payer: MEDICARE

## 2024-06-12 NOTE — PROGRESS NOTES
Est Patient Chronic Pain Note (IFollow up Visit)    The patient location is: home   The chief complaint leading to consultation is: discuss injection, mri review  Visit type: audiovisual    Face to Face time with patient: 20 minutes of total time spent on the encounter, which includes face to face time and non-face to face time preparing to see the patient (eg, review of tests), Obtaining and/or reviewing separately obtained history, Documenting clinical information in the electronic or other health record, Independently interpreting results (not separately reported) and communicating results to the patient/family/caregiver, or Care coordination (not separately reported).     Each patient to whom he or she provides medical services by telemedicine is:  (1) informed of the relationship between the physician and patient and the respective role of any other health care provider with respect to management of the patient; and (2) notified that he or she may decline to receive medical services by telemedicine and may withdraw from such care at any time.    Notes:   Referring Physician: No ref. provider found    PCP: Gurinder Pierce MD    SUBJECTIVE:    Interval History (6/13/2024):  Kacy Blackwell presents today for follow-up visit.  Since her last visit, she did complete a MRI of her lumbar spine. Patient reports pain as 3/10 today. She localizes pain to Right side of lower back, buttock and radiating down back of her thigh. She reports that the pain recently started radiating down to her calf and heel. Pain is characterized as dull and nagging.   She has participated in physical therapy for 12 weeks but did not notice any significant  difference. She also reports trying jerrell relaxants in the past without any relief.   Patient has been experiencing these symptoms after stepping off of a ladder in 2021.       Initial HPI (6/10/2024):  Kacy Blackwell is a 68 y.o. female with past medical history significant for  "hypertension, hyperlipidemia, osteopenia who presents to the clinic for the evaluation of lower back and leg pain.  Patient reports she believes pain began October 2021 while she was hanging drapery." Patient reports pain which is constant which today is rated a 2/10.  Pain at its worst can be a 6/10.  Pain is described as a dull ache in nature.  She reports pain in the lower back which can radiate down the lateral and posterior aspect of the right lower extremity in L4-S1 distribution to the heel.  Patient denies left-sided radiculopathy.  Pain can be exacerbated by going up ladders." She does not believe prolonged standing or ambulation exacerbates her pain.  She can not recall any alleviating factors.  Patient has trialed antispasmodics by primary care provider, Dr. Trimble/Dr. Pierce in the past with ineffective relief.  She has never received interventional treatment.  She does report completing 12 weeks of conventional land-based physical therapy with dry needling July through September 2023 with no improvement in her pain.  She is scheduled for lumbar MRI later on today.    Patient denies night fever/night sweats, urinary incontinence, bowel incontinence, significant weight loss, significant motor weakness, and loss of sensations.    Pain Disability Index Review:         6/10/2024     8:20 AM   Last 3 PDI Scores   Pain Disability Index (PDI) 12       Non-Pharmacologic Treatments:  Physical Therapy/Home Exercise: yes  Ice/Heat:yes  TENS: no  Acupuncture: no  Massage: no  Chiropractic: no    Other: no      Pain Medications:  - Adjuvant Medications: Ambien (Zolpidem)  - Anti-Coagulants: Aspirin    Pain Procedures:   None    Past Medical History:   Diagnosis Date    Anticoagulant long-term use     aspirin 81mg    Chronic cough     controlled w flonase/zyrtec    Encounter for blood transfusion     Hyperaldosteronism     dr cummings/marcello    Hyperlipidemia     Hypertension     Hypokalemia     Osteopenia     " Seasonal allergies      Past Surgical History:   Procedure Laterality Date    COLONOSCOPY N/A 11/17/2021    Procedure: COLONOSCOPY;  Surgeon: Kalani Quiroz MD;  Location: [facility] ENDO;  Service: Endoscopy;  Laterality: N/A;    COLONOSCOPY N/A 2/18/2022    Procedure: COLONOSCOPY;  Surgeon: Kalani Quiroz MD;  Location: [facility] ENDO;  Service: Endoscopy;  Laterality: N/A;    cyst removed from head      HYSTERECTOMY  1980    cervix absent     Review of patient's allergies indicates:   Allergen Reactions    Diovan [valsartan]      angioedema       Current Outpatient Medications   Medication Sig    aMILoride (MIDAMOR) 5 MG Tab Take 2 tablets (10 mg total) by mouth once daily.    amLODIPine (NORVASC) 2.5 MG tablet Take 1 tablet (2.5 mg total) by mouth once daily.    aspirin (ECOTRIN) 81 MG EC tablet Take 81 mg by mouth once daily.    BIOTIN ORAL Take 10,000 mg by mouth once daily.     calcium carbonate-vit D3-min 600 mg calcium- 400 unit Tab Take 1 tablet by mouth once daily.    cetirizine (ZYRTEC) 10 MG tablet Take 10 mg by mouth once daily.    hydrOXYzine HCL (ATARAX) 25 MG tablet Take 1 tablet (25 mg total) by mouth nightly as needed for Itching (overnight skin sensation).    metoprolol succinate (TOPROL-XL) 100 MG 24 hr tablet Take 1 tablet (100 mg total) by mouth once daily.    metoprolol succinate (TOPROL-XL) 100 MG 24 hr tablet TAKE ONE TABLET BY MOUTH EVERY DAY    multivitamin capsule Take 1 capsule by mouth once daily.    multivitamin with minerals tablet Take 1 tablet by mouth once daily. Nutrafol for Women    potassium chloride SA (K-DUR,KLOR-CON) 20 MEQ tablet TAKE ONE TABLET BY MOUTH TWICE A DAY    rosuvastatin (CRESTOR) 5 MG tablet Take 1 tablet (5 mg total) by mouth every evening.    zolpidem (AMBIEN) 5 MG Tab Take 1 tablet (5 mg total) by mouth nightly as needed.     No current facility-administered medications for this visit.         ROS:  GENERAL:  No weight loss, malaise or  fevers.  HEENT:   No recent changes in vision or hearing  NECK:  Negative for lumps, no difficulty with swallowing.  RESPIRATORY:  Negative for cough, wheezing or shortness of breath, patient denies any recent URI.  CARDIOVASCULAR:  Negative for chest pain or palpitations.  GI:  Negative for abdominal discomfort, blood in stools or black stools or change in bowel habits.  MUSCULOSKELETAL:  See HPI.  SKIN:  Negative for lesions, rash, and itching.  PSYCH:  No mood disorder or recent psychosocial stressors.   HEMATOLOGY/LYMPHOLOGY:  Negative for prolonged bleeding, bruising easily or swollen nodes.    NEURO:   No history of syncope, paralysis, seizures or tremors.  All other reviewed and negative other than HPI.    OBJECTIVE:  Telemedicine Exam  There were no vitals filed for this visit.  There is no height or weight on file to calculate BMI.   (reviewed on 6/13/2024)     GENERAL: Well appearing, in no acute distress, alert and oriented x3.  Cooperative.  PSYCH:  Mood and affect appropriate.  SKIN: Skin color & texture with no obvious abnormalities.    HEAD/FACE:  Normocephalic, atraumatic.    PULM:  No difficulty breathing. No nasal flaring. No obvious wheezing.  EXTREMITIES: No obvious deformities. Moving all extremities well, appears to have symmetric strength throughout.  MUSCULOSKELETAL: No obvious atrophy abnormalities are noted.   NEURO: No obvious neurologic deficit.   GAIT: sitting.     Physical Exam: last in clinic visit:      Physical Exam:    GENERAL: Well appearing, in no acute distress, alert and oriented x3.  PSYCH:  Mood and affect appropriate.  SKIN: Skin color, texture, turgor normal, no rashes or lesions.  HEAD/FACE:  Normocephalic, atraumatic. Cranial nerves grossly intact.    CV: RRR with palpation of the radial artery.  PULM: No evidence of respiratory difficulty, symmetric chest rise.  GI:  Soft and non-tender.    BACK: Straight leg raising in the sitting and supine positions is negative to  radicular pain. No pain to palpation over the facet joints of the lumbar spine or spinous processes. Normal range of motion without pain reproduction.  EXTREMITIES: Peripheral joint ROM is full and pain free without obvious instability or laxity in all four extremities. No deformities, edema, or skin discoloration. Good capillary refill.  MUSCULOSKELETAL: Able to stand on heels & toes.   Shoulder, hip, and knee provocative maneuvers are negative.    SIJ testing: (RIGHT)  - TTP over SI joint: Present  - Alana's/ Mina's: Positive    - Sacroiliac Distraction Test (anterior pressure): Positive  - Sacroiliac Compression Test (lateral pressure): Positive   - SacralThrust Test (posterior pressure): Positive     Facet loading test is negative bilaterally.   Bilateral upper and lower extremity strength is normal and symmetric.  No atrophy or tone abnormalities are noted.    RIGHT Lower extremity: Hip flexion 5/5, Hip Abduction 5/5, Hip Adduction 5/5, Knee extension 5/5, Knee flexion 5/5, Ankle dorsiflexion5/5, Extensor hallucis longus 5/5, Ankle plantarflexion 5/5  LEFT Lower extremity:  Hip flexion 5/5, Hip Abduction 5/5,Hip Adduction 5/5, Knee extension 5/5, Knee flexion 5/5, Ankle dorsiflexion 5/5, Extensor hallucis longus 5/5, Ankle plantarflexion 5/5  -Normal testing knee (patellar) jerk and ankle (achilles) jerk    NEURO: Bilateral upper and lower extremity coordination and muscle stretch reflexes are physiologic and symmetric. No loss of sensation is noted.  GAIT: normal.    Imaging (Reviewed on 6/12/2024):    Results for orders placed during the hospital encounter of 06/10/24    MRI Lumbar Spine Without Contrast    Narrative  EXAMINATION:  MRI LUMBAR SPINE WITHOUT CONTRAST    CLINICAL HISTORY:  Sciatica, right sideLow back pain, symptoms persist with > 6wks conservative treatment;    TECHNIQUE:  Standard multiplanar noncontrast MRI sequences of the lumbar spine.    COMPARISON:  None    FINDINGS:  The distal cord  and conus reveal normal signal and morphology.    The lumbar vertebra reveal normal alignment, shape and signal intensity.    T12-L1: Unremarkable.    L1-2:     Minor disc desiccation with mild generalized annular disc bulge.    L2-3:     Unremarkable.    L3-4:     Unremarkable.    L4-5:     Minimal annular bulge.    L5-S1:    Unremarkable.    Impression  No acute abnormality.  See above.      Electronically signed by: Todd Stuart MD  Date:    06/10/2024  Time:    13:23         ASSESSMENT: 68 y.o. year old female with     1. Sacroiliitis  Case Request-RAD/Other Procedure Area: Right SI Joint injection with RN IV Sedation      2. Sacroiliac joint pain  Case Request-RAD/Other Procedure Area: Right SI Joint injection with RN IV Sedation      3. Lumbar spondylosis  Case Request-RAD/Other Procedure Area: Right SI Joint injection with RN IV Sedation      4. Facet arthritis of lumbar region          PLAN:   - Interventions: Schedule patient for  right-sided sacroiliac joint injection to address sacroiliitis. Explained the risks and benefits of the procedure in detail with the patient today in clinic along with alternative treatment options, and the patient elected to pursue the intervention.      - Anticoagulation use: Patient was previously taking 81 mg Aspirin for primary ppx but discontinued one year ago. No  A/C at this time.        report:  Reviewed and consistent with medication use as prescribed.    - Medications:  -We discussed starting a membrane stabilizing agents such as gabapentin to see if this helps with neuropathic pain.  We have discussed potential side effects of this medication and mechanism of action.  Patient has deferred pharmacologic management at this time but will consider in the future.    - Therapy:   We discussed physical therapy to help manage the patient/s painful condition. The patient was counseled that muscle strengthening will improve the long term prognosis in regards to pain and  may also help increase range of motion and mobility. They were told that one of the goals of physical therapy is that they learn how to do the exercises so that they can do them independently at home daily upon completion. The patient has completed 12 weeks of physical therapy in the past  without much relief.    - Imaging:  MRI lumbar spine reviewed. No significant central or foraminal stenosis. Evidence for facet arthropathy.      - Follow up visit: return to clinic in 4 weeks after injection.      The above plan and management options were discussed at length with patient. Patient is in agreement with the above and verbalized understanding.    - I discussed the goals of interventional chronic pain management with the patient on today's visit. We discussed a multimodal and systematic approach to pain.  This includes diagnostic and therapeutic injections, adjuvant pharmacologic treatment, physical therapy, and at times psychiatry.  I emphasized the importance of regular exercise, core strengthening and stretching, diet and weight loss as a cornerstone of long-term pain management.    - This condition does not require this patient to take time off of work, and the primary goal of our Pain Management services is to improve the patient's functional capacity.  - Patient Questions: Answered all of the patient's questions regarding diagnoses, therapy, treatment and next steps        Odell Weinberg PA-C  Interventional Pain Management  [facility]

## 2024-06-13 ENCOUNTER — OFFICE VISIT (OUTPATIENT)
Dept: PAIN MEDICINE | Facility: CLINIC | Age: 69
End: 2024-06-13
Payer: MEDICARE

## 2024-06-13 ENCOUNTER — PATIENT MESSAGE (OUTPATIENT)
Dept: PAIN MEDICINE | Facility: CLINIC | Age: 69
End: 2024-06-13

## 2024-06-13 DIAGNOSIS — M53.3 SACROILIAC JOINT PAIN: ICD-10-CM

## 2024-06-13 DIAGNOSIS — M47.816 FACET ARTHRITIS OF LUMBAR REGION: ICD-10-CM

## 2024-06-13 DIAGNOSIS — M47.816 LUMBAR SPONDYLOSIS: ICD-10-CM

## 2024-06-13 DIAGNOSIS — M46.1 SACROILIITIS: Primary | ICD-10-CM

## 2024-06-13 PROCEDURE — 99213 OFFICE O/P EST LOW 20 MIN: CPT | Mod: 95,,, | Performed by: PHYSICIAN ASSISTANT

## 2024-06-26 NOTE — PRE-PROCEDURE INSTRUCTIONS
Spoke with patient regarding procedure scheduled on 7.9     Arrival time 0900     Has patient been sick with fever or on antibiotics within the last 7 days? No     Does the patient have any open wounds, sores or rashes? No     Does the patient have any recent fractures? no     Has patient received a vaccination within the last 7 days? No     Received the COVID vaccination? yes     Has the patient stopped all medications as directed? na     Does patient have a pacemaker, defibrillator, or implantable stimulator? No     Does the patient have a ride to and from procedure and someone reliable to remain with patient?  SON     Is the patient diabetic? no     Does the patient have sleep apnea? Or use O2 at home? no     Is the patient receiving sedation? yes     Is the patient instructed to remain NPO beginning at midnight the night before their procedure? yes     Procedure location confirmed with patient? Yes     Covid- Denies signs/symptoms. Instructed to notify PAT/MD if any changes

## 2024-07-09 ENCOUNTER — HOSPITAL ENCOUNTER (OUTPATIENT)
Facility: HOSPITAL | Age: 69
Discharge: HOME | End: 2024-07-09
Attending: ANESTHESIOLOGY | Admitting: ANESTHESIOLOGY
Payer: MEDICARE

## 2024-07-09 VITALS
RESPIRATION RATE: 15 BRPM | TEMPERATURE: 98 F | SYSTOLIC BLOOD PRESSURE: 122 MMHG | WEIGHT: 153.13 LBS | BODY MASS INDEX: 27.13 KG/M2 | HEART RATE: 62 BPM | OXYGEN SATURATION: 97 % | DIASTOLIC BLOOD PRESSURE: 79 MMHG | HEIGHT: 63 IN

## 2024-07-09 DIAGNOSIS — M46.1 SACROILIITIS: ICD-10-CM

## 2024-07-09 DIAGNOSIS — E87.6 HYPOKALEMIA: ICD-10-CM

## 2024-07-09 PROBLEM — M53.3 SACROILIAC JOINT PAIN: Status: ACTIVE | Noted: 2024-07-09

## 2024-07-09 PROBLEM — M47.816 LUMBAR SPONDYLOSIS: Status: ACTIVE | Noted: 2024-07-09

## 2024-07-09 PROCEDURE — 27096 INJECT SACROILIAC JOINT: CPT | Mod: RT | Performed by: ANESTHESIOLOGY

## 2024-07-09 PROCEDURE — 25500020 PHARM REV CODE 255: Performed by: ANESTHESIOLOGY

## 2024-07-09 PROCEDURE — 27096 INJECT SACROILIAC JOINT: CPT | Mod: RT,,, | Performed by: ANESTHESIOLOGY

## 2024-07-09 PROCEDURE — 63600175 PHARM REV CODE 636 W HCPCS: Performed by: ANESTHESIOLOGY

## 2024-07-09 PROCEDURE — 25000003 PHARM REV CODE 250: Performed by: ANESTHESIOLOGY

## 2024-07-09 RX ORDER — POTASSIUM CHLORIDE 20 MEQ/1
20 TABLET, EXTENDED RELEASE ORAL 2 TIMES DAILY
Qty: 180 TABLET | Refills: 3 | OUTPATIENT
Start: 2024-07-09

## 2024-07-09 RX ORDER — TRIAMCINOLONE ACETONIDE 40 MG/ML
INJECTION, SUSPENSION INTRA-ARTICULAR; INTRAMUSCULAR
Status: DISCONTINUED | OUTPATIENT
Start: 2024-07-09 | End: 2024-07-09 | Stop reason: HOSPADM

## 2024-07-09 RX ORDER — SODIUM BICARBONATE 1 MEQ/ML
SYRINGE (ML) INTRAVENOUS
Status: DISCONTINUED | OUTPATIENT
Start: 2024-07-09 | End: 2024-07-09 | Stop reason: HOSPADM

## 2024-07-09 RX ORDER — BUPIVACAINE HYDROCHLORIDE 2.5 MG/ML
INJECTION, SOLUTION EPIDURAL; INFILTRATION; INTRACAUDAL
Status: DISCONTINUED | OUTPATIENT
Start: 2024-07-09 | End: 2024-07-09 | Stop reason: HOSPADM

## 2024-07-09 RX ORDER — MIDAZOLAM HYDROCHLORIDE 1 MG/ML
INJECTION INTRAMUSCULAR; INTRAVENOUS
Status: DISCONTINUED | OUTPATIENT
Start: 2024-07-09 | End: 2024-07-09 | Stop reason: HOSPADM

## 2024-07-09 NOTE — DISCHARGE SUMMARY
Discharge Note  Short Stay      SUMMARY     Admit Date: 7/9/2024    Attending Physician: Fabian Macias MD        Discharge Physician: Fabian Macias MD        Discharge Date: 7/9/2024 9:31 AM    Procedure(s) (LRB):  Right SI Joint injection with RN IV Sedation (Right)    Final Diagnosis: Sacroiliac joint pain [M53.3]  Lumbar spondylosis [M47.816]  Sacroiliitis [M46.1]    Disposition: Home or self care    Patient Instructions:   Current Discharge Medication List        CONTINUE these medications which have NOT CHANGED    Details   aMILoride (MIDAMOR) 5 MG Tab Take 2 tablets (10 mg total) by mouth once daily.  Qty: 60 tablet, Refills: 11    Associated Diagnoses: HTN (hypertension), benign      amLODIPine (NORVASC) 2.5 MG tablet Take 1 tablet (2.5 mg total) by mouth once daily.  Qty: 90 tablet, Refills: 0    Comments: .      aspirin (ECOTRIN) 81 MG EC tablet Take 81 mg by mouth once daily.      cetirizine (ZYRTEC) 10 MG tablet Take 10 mg by mouth once daily.      hydrOXYzine HCL (ATARAX) 25 MG tablet Take 1 tablet (25 mg total) by mouth nightly as needed for Itching (overnight skin sensation).  Qty: 30 tablet, Refills: 5      !! metoprolol succinate (TOPROL-XL) 100 MG 24 hr tablet Take 1 tablet (100 mg total) by mouth once daily.  Qty: 90 tablet, Refills: 4    Comments: .  Associated Diagnoses: HTN (hypertension), benign      potassium chloride SA (K-DUR,KLOR-CON) 20 MEQ tablet Take 1 tablet (20 mEq total) by mouth 2 (two) times daily.  Qty: 180 tablet, Refills: 3    Associated Diagnoses: Hypokalemia      rosuvastatin (CRESTOR) 5 MG tablet Take 1 tablet (5 mg total) by mouth every evening.  Qty: 90 tablet, Refills: 2      zolpidem (AMBIEN) 5 MG Tab Take 1 tablet (5 mg total) by mouth nightly as needed.  Qty: 30 tablet, Refills: 2    Associated Diagnoses: Other insomnia      BIOTIN ORAL Take 10,000 mg by mouth once daily.       calcium carbonate-vit D3-min 600 mg calcium- 400 unit Tab Take 1 tablet by mouth once daily.       !! metoprolol succinate (TOPROL-XL) 100 MG 24 hr tablet TAKE ONE TABLET BY MOUTH EVERY DAY  Qty: 90 tablet, Refills: 3    Comments: .  Associated Diagnoses: HTN (hypertension), benign      multivitamin capsule Take 1 capsule by mouth once daily.      multivitamin with minerals tablet Take 1 tablet by mouth once daily. Nutrafol for Women       !! - Potential duplicate medications found. Please discuss with provider.              Discharge Diagnosis: Sacroiliac joint pain [M53.3]  Lumbar spondylosis [M47.816]  Sacroiliitis [M46.1]  Condition on Discharge: Stable with no complications to procedure   Diet on Discharge: Same as before.  Activity: as per instruction sheet.  Discharge to: Home with a responsible adult.  Follow up: 2-4 weeks       Please call the office at [phone] if you experience any weakness or loss of sensation, fever > 101.5, pain uncontrolled with oral medications, persistent nausea/vomiting/or diarrhea, redness or drainage from the incisions, or any other worrisome concerns. If physician on call was not reached or could not communicate with our office for any reason please go to the nearest emergency department

## 2024-07-09 NOTE — DISCHARGE INSTRUCTIONS

## 2024-07-09 NOTE — PLAN OF CARE
Upon transfer to  with standby assist, pt reports weakness to right leg and stumbled. No falls at this time. Pt assisted to  by RN. Pt denies numbness to right leg and reports muscle weakness only. Dr. Macias notified of this. Md ordered pt to remain in facility for at  least 15 additional minutes and she will assess pt before leaving. Pt transferred to [facility] room 4 at this time. Report given to Silvia SCHAEFER.

## 2024-07-09 NOTE — PROGRESS NOTES
Pt transported to MS Room 4 from Pain for extended care. Family not present at this time.  Call light within reach, will continue to monitor.

## 2024-07-09 NOTE — OP NOTE
Kacy Blackwell  68 y.o. female      Vitals:    07/09/24 0928   BP: (!) 153/84   Pulse: 70   Resp: 17   Temp:        Procedure Date: 07/09/2024      INFORMED CONSENT: The procedure, risks, benefits and options were discussed with patient. There are no contraindications to the procedure. The patient expressed understanding and agreed to proceed. The personnel performing the procedure was discussed. I verify that I personally obtained consent prior to the start of the procedure and the signed consent can be found on the patient's chart.       Anesthesia:   Conscious sedation provided by M.D    The patient was monitored with continuous pulse oximetry, EKG, and intermittent blood pressure monitors.  The patient was hemodynamically stable throughout the entire process was responsive to voice, and breathing spontaneously.  Supplemental O2 was provided at 2L/min via nasal cannula.  Patient was comfortable for the duration of the procedure. (See nurse documentation and case log for sedation time)    There was a total of 1mg IV Midazolam and 0mcg Fentanyl titrated for the procedure    Pre Procedure diagnosis: Sacroiliac joint pain [M53.3]  Lumbar spondylosis [M47.816]  Sacroiliitis [M46.1]  Post-Procedure diagnosis: SAME      PROCEDURE:   Right sacroiliac joint injection                            REASON FOR PROCEDURE:   Sacroiliitis [M46.1]        MEDICATIONS INJECTED: 1mL 40mg/ml Kenalog and 4mL Bupivacaine 0.25% into each site    LOCAL ANESTHETIC USED: Xylocaine 1% 6ml     ESTIMATED BLOOD LOSS: None.   COMPLICATIONS: None.     TECHNIQUE:       Sacroiliac joint injection:   Laying in the prone position, the patient was prepped and draped in the usual sterile fashion using ChloraPrep and fenestrated drape.  The area was determined under fluoroscopy.  Local Xylocaine was injected by raising a wheel and going down to the periosteum using a 27-gauge hypodermic needle.  The 3.5 inch 22-gauge spinal needle was introduce into the  Right sacroiliac joint.  Negative pressure applied to confirm no intravascular placement.  Omnipaque was injected to confirm placement and to confirm that there was no vascular runoff.  The medication was then injected slowly.  The patient tolerated the procedure well.                       The patient was monitored for approximately 30 minutes after the procedure. Patient was given post procedure and discharge instructions to follow at home. We will see the patient back in two weeks or the patient may call to inform of status. The patient was discharged in a stable condition

## 2024-07-09 NOTE — TELEPHONE ENCOUNTER
No care due was identified.  [facility] Embedded Care Due Messages. Reference number: [id number].   7/09/2024 3:42:08 PM CDT

## 2024-07-10 NOTE — TELEPHONE ENCOUNTER
Refill Decision Note   Kacy Blackwell  is requesting a refill authorization.    Brief Assessment and Rationale for Refill:   Quick Discontinue       Medication Therapy Plan:   E-Prescribing Status: Receipt confirmed by pharmacy (7/8/2024  8:07 AM CDT)      Comments:     Note composed:9:55 PM 07/09/2024

## 2024-07-15 RX ORDER — ROSUVASTATIN CALCIUM 5 MG/1
5 TABLET, COATED ORAL NIGHTLY
Qty: 90 TABLET | Refills: 3 | Status: SHIPPED | OUTPATIENT
Start: 2024-07-15

## 2024-07-15 NOTE — TELEPHONE ENCOUNTER
No care due was identified.  [facility] Embedded Care Due Messages. Reference number: [id number].   7/15/2024 3:22:10 PM CDT

## 2024-07-16 ENCOUNTER — PATIENT MESSAGE (OUTPATIENT)
Dept: INTERNAL MEDICINE | Facility: CLINIC | Age: 69
End: 2024-07-16
Payer: MEDICARE

## 2024-07-16 RX ORDER — ROSUVASTATIN CALCIUM 5 MG/1
5 TABLET, COATED ORAL NIGHTLY
Qty: 90 TABLET | Refills: 3 | OUTPATIENT
Start: 2024-07-16

## 2024-07-16 NOTE — TELEPHONE ENCOUNTER
Refill Decision Note   Kacy Blackwell  is requesting a refill authorization.  Brief Assessment and Rationale for Refill:  Approve     Medication Therapy Plan:        Comments:     Note composed:9:35 PM 07/15/2024

## 2024-07-16 NOTE — TELEPHONE ENCOUNTER
No care due was identified.  [facility] Embedded Care Due Messages. Reference number: [id number].   7/15/2024 10:12:17 PM CDT

## 2024-07-24 RX ORDER — AMLODIPINE BESYLATE 2.5 MG/1
2.5 TABLET ORAL DAILY
Qty: 90 TABLET | Refills: 3 | Status: SHIPPED | OUTPATIENT
Start: 2024-07-24

## 2024-07-24 RX ORDER — AMLODIPINE BESYLATE 2.5 MG/1
TABLET ORAL
Qty: 90 TABLET | Refills: 0 | OUTPATIENT
Start: 2024-07-24

## 2024-07-24 NOTE — TELEPHONE ENCOUNTER
Refill Decision Note   Kacy Blackwell  is requesting a refill authorization.  Brief Assessment and Rationale for Refill:  Approve     Medication Therapy Plan:         Comments:     Note composed:6:09 PM 07/24/2024

## 2024-07-24 NOTE — TELEPHONE ENCOUNTER
No care due was identified.  [facility] Embedded Care Due Messages. Reference number: [id number].   7/24/2024 3:04:22 PM CDT

## 2024-07-24 NOTE — TELEPHONE ENCOUNTER
Refill Decision Note   Kacy Rishi  is requesting a refill authorization.  Brief Assessment and Rationale for Refill:  Quick Discontinue     Medication Therapy Plan:       Medication Reconciliation Completed: No   Comments:     No Care Gaps recommended.     Duplicate Pended Encounter(s)/ Last Prescribed Details:    Pharmacy    [id number] at [address] AT [address] [address]  Phone: [phone]  Fax: [phone]  BOY #: [id number]   TIFF Reason: --     Outpatient Medication Detail     Disp Refills Start End TIFF   amLODIPine (NORVASC) 2.5 MG tablet 90 tablet 3 7/24/2024 -- No   Sig - Route: Take 1 tablet (2.5 mg total) by mouth once daily. - Oral   Sent to pharmacy as: amLODIPine (NORVASC) 2.5 MG tablet   Class: Normal   Notes to Pharmacy: .   Order: [id number]   Date/Time Signed: 7/24/2024 18:09       E-Prescribing Status: Receipt confirmed by pharmacy (7/24/2024  6:10 PM CDT)              Note composed:6:13 PM 07/24/2024

## 2024-07-24 NOTE — TELEPHONE ENCOUNTER
No care due was identified.  [facility] Embedded Care Due Messages. Reference number: [id number].   7/24/2024 3:01:36 PM CDT

## 2024-08-06 ENCOUNTER — PATIENT MESSAGE (OUTPATIENT)
Dept: PAIN MEDICINE | Facility: CLINIC | Age: 69
End: 2024-08-06

## 2024-08-06 ENCOUNTER — OFFICE VISIT (OUTPATIENT)
Dept: PAIN MEDICINE | Facility: CLINIC | Age: 69
End: 2024-08-06
Payer: MEDICARE

## 2024-08-06 VITALS — HEIGHT: 63 IN | BODY MASS INDEX: 27.12 KG/M2

## 2024-08-06 DIAGNOSIS — M47.818 ARTHROPATHY OF RIGHT SACROILIAC JOINT: Primary | ICD-10-CM

## 2024-08-06 DIAGNOSIS — M53.3 SACROILIAC JOINT PAIN: ICD-10-CM

## 2024-08-06 DIAGNOSIS — M46.1 SACROILIITIS: ICD-10-CM

## 2024-08-06 DIAGNOSIS — M47.816 LUMBAR SPONDYLOSIS: ICD-10-CM

## 2024-08-06 DIAGNOSIS — M54.59 LUMBAR FACET JOINT PAIN: ICD-10-CM

## 2024-08-06 PROCEDURE — 99214 OFFICE O/P EST MOD 30 MIN: CPT | Mod: 95,,, | Performed by: PHYSICIAN ASSISTANT

## 2024-08-06 RX ORDER — LIDOCAINE 50 MG/G
PATCH TOPICAL
Qty: 90 PATCH | Refills: 0 | Status: SHIPPED | OUTPATIENT
Start: 2024-08-06

## 2024-08-26 DIAGNOSIS — I10 HTN (HYPERTENSION), BENIGN: ICD-10-CM

## 2024-08-26 NOTE — TELEPHONE ENCOUNTER
No care due was identified.  [facility] Embedded Care Due Messages. Reference number: [id number].   8/26/2024 4:58:32 PM CDT

## 2024-08-27 RX ORDER — METOPROLOL SUCCINATE 100 MG/1
100 TABLET, EXTENDED RELEASE ORAL DAILY
Qty: 90 TABLET | Refills: 3 | Status: SHIPPED | OUTPATIENT
Start: 2024-08-27

## 2024-08-27 NOTE — TELEPHONE ENCOUNTER
Refill Decision Note   Kacy Rishi  is requesting a refill authorization.  Brief Assessment and Rationale for Refill:  Approve     Medication Therapy Plan:       Medication Reconciliation Completed: No   Comments:     No Care Gaps recommended.     Note composed:8:18 AM 08/27/2024

## 2025-01-14 ENCOUNTER — HOSPITAL ENCOUNTER (OUTPATIENT)
Dept: RADIOLOGY | Facility: HOSPITAL | Age: 70
Discharge: HOME | End: 2025-01-14
Attending: FAMILY MEDICINE
Payer: MEDICARE

## 2025-01-14 VITALS — WEIGHT: 153 LBS | HEIGHT: 63 IN | BODY MASS INDEX: 27.11 KG/M2

## 2025-01-14 DIAGNOSIS — Z12.31 ENCOUNTER FOR SCREENING MAMMOGRAM FOR BREAST CANCER: ICD-10-CM

## 2025-01-14 PROCEDURE — 77063 BREAST TOMOSYNTHESIS BI: CPT | Mod: 26,,, | Performed by: RADIOLOGY

## 2025-01-14 PROCEDURE — 77067 SCR MAMMO BI INCL CAD: CPT | Mod: 26,,, | Performed by: RADIOLOGY

## 2025-01-14 PROCEDURE — 77063 BREAST TOMOSYNTHESIS BI: CPT | Mod: TC

## 2025-01-20 ENCOUNTER — PATIENT MESSAGE (OUTPATIENT)
Dept: ADMINISTRATIVE | Facility: HOSPITAL | Age: 70
End: 2025-01-20
Payer: MEDICARE

## 2025-01-24 ENCOUNTER — PATIENT OUTREACH (OUTPATIENT)
Dept: ADMINISTRATIVE | Facility: HOSPITAL | Age: 70
End: 2025-01-24
Payer: MEDICARE

## 2025-01-24 DIAGNOSIS — Z12.11 ENCOUNTER FOR COLORECTAL CANCER SCREENING: Primary | ICD-10-CM

## 2025-01-24 DIAGNOSIS — Z12.12 ENCOUNTER FOR COLORECTAL CANCER SCREENING: Primary | ICD-10-CM

## 2025-02-19 ENCOUNTER — TELEPHONE (OUTPATIENT)
Dept: INTERNAL MEDICINE | Facility: CLINIC | Age: 70
End: 2025-02-19
Payer: MEDICARE

## 2025-02-19 NOTE — TELEPHONE ENCOUNTER
Spoke with the patient stated that she was calling for the executive health department to schedule her appointment for this year. The patient was informed that her last Exc. Appointment was 5/30/24. The patient was informed that Dr Pierce do have assigned appointment time for his executive patients. The patient was informed to give the follow department another week to contact her to schedule her appointment. The patient stated understanding

## 2025-02-19 NOTE — TELEPHONE ENCOUNTER
----- Message from Selam sent at 2/19/2025  2:31 PM CST -----  Contact: self  .Type:  Needs Medical AdviceWho Called: .Kacy Alejoould the patient rather a call back or a response via [facility]? Call Austinest Call Back Number: .[phone] Information: Pt states she normally has executive health visits and is wanting to speak with someone in regard to not having one in awhile or if they still do that.

## 2025-02-20 ENCOUNTER — HOSPITAL ENCOUNTER (OUTPATIENT)
Dept: PREADMISSION TESTING | Facility: HOSPITAL | Age: 70
Discharge: HOME | End: 2025-02-20
Attending: FAMILY MEDICINE
Payer: MEDICARE

## 2025-02-20 DIAGNOSIS — R06.00 DYSPNEA, UNSPECIFIED TYPE: Primary | ICD-10-CM

## 2025-02-20 DIAGNOSIS — Z12.11 ENCOUNTER FOR COLORECTAL CANCER SCREENING: Primary | ICD-10-CM

## 2025-02-20 DIAGNOSIS — Z12.12 ENCOUNTER FOR COLORECTAL CANCER SCREENING: Primary | ICD-10-CM

## 2025-02-20 RX ORDER — SOD SULF/POT CHLORIDE/MAG SULF 1.479 G
12 TABLET ORAL DAILY
Qty: 24 TABLET | Refills: 0 | Status: SHIPPED | OUTPATIENT
Start: 2025-02-20

## 2025-03-05 ENCOUNTER — TELEPHONE (OUTPATIENT)
Dept: PREADMISSION TESTING | Facility: HOSPITAL | Age: 70
End: 2025-03-05
Payer: MEDICARE

## 2025-03-05 NOTE — TELEPHONE ENCOUNTER
----- Message from Eusebia sent at 3/5/2025 12:20 PM CST -----  Contact: Patient, [phone]  .1MEDICALADVICE Patient is calling for Medical Advice regarding: Calling because she can not afford the Prep in pill form, please prescribe the liquid prep. Thanks.How long has patient had these symptoms: N/APharmacy name and phone#: [id number] at [address] AT [address] [address]Tskkh: [phone] Fax: [phone] wants a call back or thru [facility]: Call backComments: Please advise. Thanks.Please advise patient replies from provider may take up to 48 hours.

## 2025-03-07 ENCOUNTER — ANESTHESIA EVENT (OUTPATIENT)
Dept: ENDOSCOPY | Facility: HOSPITAL | Age: 70
End: 2025-03-07
Payer: MEDICARE

## 2025-03-07 ENCOUNTER — HOSPITAL ENCOUNTER (OUTPATIENT)
Dept: ENDOSCOPY | Facility: HOSPITAL | Age: 70
Discharge: HOME | End: 2025-03-07
Attending: FAMILY MEDICINE
Payer: MEDICARE

## 2025-03-07 ENCOUNTER — ANESTHESIA (OUTPATIENT)
Dept: ENDOSCOPY | Facility: HOSPITAL | Age: 70
End: 2025-03-07
Payer: MEDICARE

## 2025-03-07 VITALS
DIASTOLIC BLOOD PRESSURE: 75 MMHG | HEART RATE: 70 BPM | HEIGHT: 63 IN | SYSTOLIC BLOOD PRESSURE: 131 MMHG | OXYGEN SATURATION: 98 % | RESPIRATION RATE: 20 BRPM | WEIGHT: 149.56 LBS | BODY MASS INDEX: 26.5 KG/M2 | TEMPERATURE: 98 F

## 2025-03-07 DIAGNOSIS — Z12.11 ENCOUNTER FOR COLORECTAL CANCER SCREENING: ICD-10-CM

## 2025-03-07 DIAGNOSIS — Z12.12 ENCOUNTER FOR COLORECTAL CANCER SCREENING: ICD-10-CM

## 2025-03-07 DIAGNOSIS — Z12.11 COLON CANCER SCREENING: Primary | ICD-10-CM

## 2025-03-07 PROCEDURE — 27201089 HC SNARE, DISP (ANY)

## 2025-03-07 PROCEDURE — 27201012 HC FORCEPS, HOT/COLD, DISP

## 2025-03-07 PROCEDURE — 37000009 HC ANESTHESIA EA ADD 15 MINS

## 2025-03-07 PROCEDURE — 63600175 PHARM REV CODE 636 W HCPCS: Performed by: NURSE ANESTHETIST, CERTIFIED REGISTERED

## 2025-03-07 PROCEDURE — 88305 TISSUE EXAM BY PATHOLOGIST: CPT | Mod: 59 | Performed by: PATHOLOGY

## 2025-03-07 PROCEDURE — 37000008 HC ANESTHESIA 1ST 15 MINUTES

## 2025-03-07 RX ORDER — LIDOCAINE HYDROCHLORIDE 20 MG/ML
INJECTION INTRAVENOUS
Status: DISCONTINUED | OUTPATIENT
Start: 2025-03-07 | End: 2025-03-07

## 2025-03-07 RX ORDER — PROPOFOL 10 MG/ML
INJECTION, EMULSION INTRAVENOUS
Status: DISCONTINUED | OUTPATIENT
Start: 2025-03-07 | End: 2025-03-07

## 2025-03-07 RX ORDER — SODIUM CHLORIDE, SODIUM LACTATE, POTASSIUM CHLORIDE, CALCIUM CHLORIDE 600; 310; 30; 20 MG/100ML; MG/100ML; MG/100ML; MG/100ML
INJECTION, SOLUTION INTRAVENOUS CONTINUOUS PRN
Status: DISCONTINUED | OUTPATIENT
Start: 2025-03-07 | End: 2025-03-07

## 2025-03-07 RX ADMIN — SODIUM CHLORIDE, SODIUM LACTATE, POTASSIUM CHLORIDE, AND CALCIUM CHLORIDE: .6; .31; .03; .02 INJECTION, SOLUTION INTRAVENOUS at 11:03

## 2025-03-07 RX ADMIN — PROPOFOL 50 MG: 10 INJECTION, EMULSION INTRAVENOUS at 11:03

## 2025-03-07 RX ADMIN — PROPOFOL 50 MG: 10 INJECTION, EMULSION INTRAVENOUS at 12:03

## 2025-03-07 RX ADMIN — LIDOCAINE HYDROCHLORIDE 100 MG: 20 INJECTION INTRAVENOUS at 11:03

## 2025-03-07 NOTE — ANESTHESIA PREPROCEDURE EVALUATION
03/07/2025  Kacy Blackwell is a 69 y.o., female.    PLAN:;  Past Surgical History:   Procedure Laterality Date    COLONOSCOPY N/A 11/17/2021    Procedure: COLONOSCOPY;  Surgeon: Kalani Quiroz MD;  Location: [facility] ENDO;  Service: Endoscopy;  Laterality: N/A;    COLONOSCOPY N/A 2/18/2022    Procedure: COLONOSCOPY;  Surgeon: Kalani Quiroz MD;  Location: [facility] ENDO;  Service: Endoscopy;  Laterality: N/A;    cyst removed from head      HYSTERECTOMY  1980    cervix absent    INJECTION OF ANESTHETIC AGENT INTO SACROILIAC JOINT Right 7/9/2024    Procedure: Right SI Joint injection with RN IV Sedation;  Surgeon: Fabian Macias MD;  Location: [facility] PAIN MGT;  Service: Pain Management;  Laterality: Right;     Patient Active Problem List   Diagnosis    Hypokalemia    HTN (hypertension), benign    Hyperlipidemia    Other insomnia    Osteopenia    Sacroiliac joint pain    Lumbar spondylosis       Pre-op Assessment    I have reviewed the Patient Summary Reports.     I have reviewed the Nursing Notes. I have reviewed the NPO Status.   I have reviewed the Medications.     Review of Systems  Anesthesia Hx:  No problems with previous Anesthesia   History of prior surgery of interest to airway management or planning:  Previous anesthesia: General        Denies Family Hx of Anesthesia complications.    Denies Personal Hx of Anesthesia complications.                    Social:  Non-Smoker, Social Alcohol Use       Cardiovascular:     Hypertension           hyperlipidemia   ECG has been reviewed. Normal sinus rhythm   Normal ECG   When compared with ECG of 29-MAY-2023 08:05,   No significant change was found   Confirmed by LUANNE THRASHER, DANIEL CLARK ([id number]) on 5/30/2024                        Hypertension         Hepatic/GI:  Bowel Prep.                   Musculoskeletal:  Arthritis        Arthritis           Neurological:      Arthritis                               Physical Exam  General: Alert and Oriented    Airway:  Mallampati: II   Mouth Opening: Normal  TM Distance: Normal  Tongue: Normal  Neck ROM: Normal ROM    Dental:  Intact    Chest/Lungs:  Clear to auscultation, Normal Respiratory Rate    Heart:  Rate: Normal  Rhythm: Regular Rhythm        Anesthesia Plan  Type of Anesthesia, risks & benefits discussed:    Anesthesia Type: Gen Natural Airway  Intra-op Monitoring Plan: Standard ASA Monitors  Post Op Pain Control Plan: multimodal analgesia  Induction:  IV  Informed Consent: Informed consent signed with the Patient and all parties understand the risks and agree with anesthesia plan.  All questions answered. Patient consented to blood products? No  ASA Score: 2  Day of Surgery Review of History & Physical: H&P Update referred to the surgeon/provider.    Ready For Surgery From Anesthesia Perspective.     .

## 2025-03-07 NOTE — DISCHARGE SUMMARY
[facility] - Endoscopy 1st Fl  Discharge Note  Short Stay    Colonoscopy      OUTCOME: Patient tolerated treatment/procedure well without complication and is now ready for discharge.    DISPOSITION: Home or Self Care    FINAL DIAGNOSIS:  <principal problem not specified>    FOLLOWUP: With primary care provider    DISCHARGE INSTRUCTIONS:  No discharge procedures on file.      Clinical Reference Documents Added to Patient Instructions         Document    COLON POLYPECTOMY DISCHARGE INSTRUCTIONS (ENGLISH)            TIME SPENT ON DISCHARGE: 20 minutes

## 2025-03-07 NOTE — PLAN OF CARE
Discharge instructions reviewed with patient and visitor. Handouts given & verbalized understanding with no further questions at this time. Dr Anna spoke to pt at bedside, reviewed procedure and findings, answered questions. Made aware they are awaiting biopsy results with MD telephone number provided per AVS sheet. VSS on RA, no pain or nausea noted, tolerating po fluids, no complaints noted. Fall precautions reviewed, consents in chart, PIV removed at this time.

## 2025-03-07 NOTE — ANESTHESIA POSTPROCEDURE EVALUATION
Anesthesia Post Evaluation    Patient: Kacy Blackwell    Procedure(s) Performed: * No procedures listed *    Final Anesthesia Type: general      Patient location during evaluation: PACU  Patient participation: Yes- Able to Participate  Level of consciousness: awake and alert and oriented  Post-procedure vital signs: reviewed and stable  Pain management: adequate  Airway patency: patent    PONV status at discharge: No PONV  Anesthetic complications: no      Cardiovascular status: blood pressure returned to baseline, stable and hemodynamically stable  Respiratory status: unassisted  Hydration status: euvolemic  Follow-up not needed.              Vitals Value Taken Time   /79 03/07/25 12:35   Temp 36.7 °C (98 °F) 03/07/25 12:12   Pulse 76 03/07/25 12:36   Resp 17 03/07/25 12:36   SpO2 97 % 03/07/25 12:36   Vitals shown include unfiled device data.      Event Time   Out of Recovery 12:42:05         Pain/Wilda Score: Wilda Score: 10 (3/7/2025 12:32 PM)

## 2025-03-07 NOTE — TRANSFER OF CARE
"Anesthesia Transfer of Care Note    Patient: Kacy Blackwell    Procedure(s) Performed: * No procedures listed *    Patient location: PACU    Anesthesia Type: general    Transport from OR: Transported from OR on room air with adequate spontaneous ventilation    Post pain: adequate analgesia    Post assessment: no apparent anesthetic complications    Post vital signs: stable    Level of consciousness: awake    Nausea/Vomiting: no nausea/vomiting    Complications: none    Transfer of care protocol was followed      Last vitals: Visit Vitals  /79 (BP Location: Right arm, Patient Position: Sitting)   Pulse 72   Temp 36.3 °C (97.3 °F) (Temporal)   Resp 16   Ht 5' 3" (1.6 m)   Wt 67.8 kg (149 lb 9.3 oz)   LMP  (LMP Unknown)   SpO2 99%   Breastfeeding No   BMI 26.50 kg/m²     "

## 2025-03-07 NOTE — H&P
Endoscopy History and Physical    PCP - Gurinder Pierce MD  Referring Physician - Gurinder Pierce MD  [address] [place] [address]      ASA - per anesthesia  Mallampati - per anesthesia  History of Anesthesia problems - no  Family history Anesthesia problems -  no   Plan of anesthesia - General    HPI  69 y.o. female    Planned Procedure: Colonoscopy  Diagnosis: previous adenomatous polyp  Chief Complaint: Same as above    Personnel H/o colon polyps:yes  FH of colon cancer:no  Anticoagulation:no      ROS:  Constitutional: No fevers, chills, No weight loss  CV: No chest pain  Pulm: No cough, No shortness of breath  GI: see HPI    Medical History:  has a past medical history of Anticoagulant long-term use, Chronic cough, Encounter for blood transfusion, Hyperaldosteronism, Hyperlipidemia, Hypertension, Hypokalemia, Osteopenia, and Seasonal allergies.    Surgical History:  has a past surgical history that includes Hysterectomy (1980); cyst removed from head; Colonoscopy (N/A, 11/17/2021); Colonoscopy (N/A, 2/18/2022); and Injection of anesthetic agent into sacroiliac joint (Right, 7/9/2024).    Family History: family history includes Arthritis in her father and mother; Breast cancer in an other family member; Cancer in her sister; Diabetes in her brother and mother; Heart failure in her mother; Hypertension in her brother, father, mother, and sister; Stroke in her brother..    Social History:  reports that she has never smoked. She has never used smokeless tobacco. She reports current alcohol use. She reports that she does not use drugs.    Review of patient's allergies indicates:   Allergen Reactions    Diovan [valsartan]      angioedema       Medications:   Prescriptions Prior to Admission[1]    Physical Exam:    Vital Signs:   Vitals:    03/07/25 1054   BP: 136/79   Pulse: 72   Resp: 16   Temp: 97.3 °F (36.3 °C)       General Appearance: Well appearing in no acute distress  Abdomen: Soft, non  tender, non distended with normal bowel sounds, no masses    Labs:  Lab Results   Component Value Date    WBC 6.10 05/30/2024    HGB 12.9 05/30/2024    HCT 39.3 05/30/2024     05/30/2024    CHOL 177 05/30/2024    TRIG 138 05/30/2024    HDL 56 05/30/2024    ALT 21 05/30/2024    AST 22 05/30/2024     05/30/2024    K 3.9 05/30/2024     05/30/2024    CREATININE 1.0 05/30/2024    BUN 16 05/30/2024    CO2 27 05/30/2024    TSH 1.309 05/30/2024    GLUF 79 05/03/2013    HGBA1C 5.5 05/30/2024       I have explained the risks and benefits of this endoscopic procedure to the patient including but not limited to bleeding, inflammation, infection, perforation, and death.    SEDATION PLAN: per anesthesia       History reviewed, vital signs satisfactory, cardiopulmonary status satisfactory, sedation options, risks and plans have been discussed with the patient  All their questions were answered and the patient agrees to the sedation procedures as planned and the patient is deemed an appropriate candidate for the sedation as planned.     The risks, benefits and alternatives of the procedure were discussed with the patient in detail. This discussion was had in the presence of endoscopy staff. The risks include, risks of adverse reaction to sedation requiring the use of reversal agents, bleeding requiring blood transfusion, perforation requiring surgical intervention and technical failure. Other risks include aspiration leading to respiratory distress and respiratory failure resulting in endotracheal intubation and mechanical ventilation including death. If anesthesia is being utilized for this procedure, it is up to the anesthesiologist to determine airway safety including elective endotracheal intubation. Questions were answered, they agree to proceed. There was no language barriers.       Procedure explained to patient, informed consent obtained and placed in chart.       John Anna MD       [1] (Not in  a hospital admission)

## 2025-03-11 ENCOUNTER — RESULTS FOLLOW-UP (OUTPATIENT)
Dept: HEPATOLOGY | Facility: CLINIC | Age: 70
End: 2025-03-11

## 2025-03-11 LAB
FINAL PATHOLOGIC DIAGNOSIS: NORMAL
GROSS: NORMAL
Lab: NORMAL
MICROSCOPIC EXAM: NORMAL

## 2025-03-23 DIAGNOSIS — I10 HTN (HYPERTENSION), BENIGN: ICD-10-CM

## 2025-03-24 DIAGNOSIS — I10 HTN (HYPERTENSION), BENIGN: ICD-10-CM

## 2025-03-24 RX ORDER — AMILORIDE HYDROCHLORIDE 5 MG/1
10 TABLET ORAL DAILY
Qty: 180 TABLET | Refills: 0 | Status: SHIPPED | OUTPATIENT
Start: 2025-03-24

## 2025-03-24 NOTE — TELEPHONE ENCOUNTER
No care due was identified.  [facility] Embedded Care Due Messages. Reference number: [id number].   3/24/2025 2:50:25 PM CDT

## 2025-03-24 NOTE — TELEPHONE ENCOUNTER
Care Due:                  Date            Visit Type   Department     Provider  --------------------------------------------------------------------------------                                ADMIT                               REVIEW EXEC   [facility] INTERNAL  Last Visit: 05-      CHECK        MEDICINE       Gurinder Pierce                              ADMIT                               REVIEW EXEC   [facility] INTERNAL  Next Visit: 06-      CHECK        MEDICINE       Gurinder Pierce                                                            Last  Test          Frequency    Reason                     Performed    Due Date  --------------------------------------------------------------------------------    CMP.........  12 months..  aMILoride, potassium,      05- 05-                             rosuvastatin.............    Lipid Panel.  12 months..  rosuvastatin.............  05- 05-    [facility] Embedded Care Due Messages. Reference number: [id number].   3/24/2025 12:07:04 AM CDT

## 2025-03-24 NOTE — TELEPHONE ENCOUNTER
Provider Staff:  Action required for this patient       Please see care gap opportunities below in Care Due Message.    Thanks!  [facility]     Appointments      Date Provider   Last Visit   5/30/2024 Gurinder Pierce MD   Next Visit   6/2/2025 Gurinder Pierce MD     Refill Decision Note   Kacy Blackwell  is requesting a refill authorization.  Brief Assessment and Rationale for Refill:  Approve     Medication Therapy Plan:  Lab(s) recommended: CMP, Lipid Panel      Comments:     Note composed:12:14 AM 03/24/2025

## 2025-03-25 RX ORDER — AMILORIDE HYDROCHLORIDE 5 MG/1
10 TABLET ORAL DAILY
Qty: 180 TABLET | Refills: 0 | OUTPATIENT
Start: 2025-03-25

## 2025-03-26 ENCOUNTER — PATIENT MESSAGE (OUTPATIENT)
Dept: INTERNAL MEDICINE | Facility: CLINIC | Age: 70
End: 2025-03-26
Payer: MEDICARE

## 2025-06-02 ENCOUNTER — HOSPITAL ENCOUNTER (OUTPATIENT)
Dept: CARDIOLOGY | Facility: HOSPITAL | Age: 70
Discharge: HOME | End: 2025-06-02
Payer: MEDICARE

## 2025-06-02 ENCOUNTER — CLINICAL SUPPORT (OUTPATIENT)
Dept: INTERNAL MEDICINE | Facility: CLINIC | Age: 70
End: 2025-06-02
Payer: MEDICARE

## 2025-06-02 ENCOUNTER — OFFICE VISIT (OUTPATIENT)
Dept: INTERNAL MEDICINE | Facility: CLINIC | Age: 70
End: 2025-06-02
Payer: MEDICARE

## 2025-06-02 VITALS
HEART RATE: 67 BPM | SYSTOLIC BLOOD PRESSURE: 127 MMHG | WEIGHT: 152 LBS | DIASTOLIC BLOOD PRESSURE: 77 MMHG | TEMPERATURE: 97 F | BODY MASS INDEX: 26.93 KG/M2 | HEIGHT: 63 IN

## 2025-06-02 DIAGNOSIS — R53.83 FATIGUE, UNSPECIFIED TYPE: Primary | ICD-10-CM

## 2025-06-02 DIAGNOSIS — Z00.00 ROUTINE HEALTH MAINTENANCE: Primary | ICD-10-CM

## 2025-06-02 DIAGNOSIS — R53.0 NEOPLASTIC MALIGNANT RELATED FATIGUE: ICD-10-CM

## 2025-06-02 DIAGNOSIS — R73.09 OTHER ABNORMAL GLUCOSE: ICD-10-CM

## 2025-06-02 DIAGNOSIS — M85.80 OSTEOPENIA, UNSPECIFIED LOCATION: ICD-10-CM

## 2025-06-02 DIAGNOSIS — E78.2 MIXED HYPERLIPIDEMIA: ICD-10-CM

## 2025-06-02 DIAGNOSIS — R06.00 DYSPNEA, UNSPECIFIED TYPE: ICD-10-CM

## 2025-06-02 DIAGNOSIS — E78.5 HYPERLIPIDEMIA, UNSPECIFIED HYPERLIPIDEMIA TYPE: ICD-10-CM

## 2025-06-02 DIAGNOSIS — R79.9 ABNORMAL BLOOD CHEMISTRY: ICD-10-CM

## 2025-06-02 DIAGNOSIS — I10 HTN (HYPERTENSION), BENIGN: ICD-10-CM

## 2025-06-02 DIAGNOSIS — G47.09 OTHER INSOMNIA: ICD-10-CM

## 2025-06-02 PROBLEM — Z12.11 COLON CANCER SCREENING: Status: RESOLVED | Noted: 2025-03-07 | Resolved: 2025-06-02

## 2025-06-02 PROBLEM — M47.816 LUMBAR SPONDYLOSIS: Status: RESOLVED | Noted: 2024-07-09 | Resolved: 2025-06-02

## 2025-06-02 PROBLEM — M53.3 SACROILIAC JOINT PAIN: Status: RESOLVED | Noted: 2024-07-09 | Resolved: 2025-06-02

## 2025-06-02 LAB
ALBUMIN SERPL BCP-MCNC: 3.9 G/DL (ref 3.5–5.2)
ALP SERPL-CCNC: 68 UNIT/L (ref 40–150)
ALT SERPL W/O P-5'-P-CCNC: 31 UNIT/L (ref 10–44)
ANION GAP (OHS): 7 MMOL/L (ref 8–16)
AST SERPL-CCNC: 27 UNIT/L (ref 11–45)
BILIRUB SERPL-MCNC: 0.4 MG/DL (ref 0.1–1)
BUN SERPL-MCNC: 18 MG/DL (ref 8–23)
CALCIUM SERPL-MCNC: 9.2 MG/DL (ref 8.7–10.5)
CHLORIDE SERPL-SCNC: 110 MMOL/L (ref 95–110)
CHOLEST SERPL-MCNC: 159 MG/DL (ref 120–199)
CHOLEST/HDLC SERPL: 2.8 {RATIO} (ref 2–5)
CO2 SERPL-SCNC: 25 MMOL/L (ref 23–29)
CREAT SERPL-MCNC: 0.9 MG/DL (ref 0.5–1.4)
EAG (OHS): 108 MG/DL (ref 68–131)
ERYTHROCYTE [DISTWIDTH] IN BLOOD BY AUTOMATED COUNT: 13.8 % (ref 11.5–14.5)
GFR SERPLBLD CREATININE-BSD FMLA CKD-EPI: >60 ML/MIN/1.73/M2
GLUCOSE SERPL-MCNC: 109 MG/DL (ref 70–110)
HBA1C MFR BLD: 5.4 % (ref 4–5.6)
HCT VFR BLD AUTO: 39.5 % (ref 37–48.5)
HDLC SERPL-MCNC: 56 MG/DL (ref 40–75)
HDLC SERPL: 35.2 % (ref 20–50)
HGB BLD-MCNC: 12.5 GM/DL (ref 12–16)
HOLD SPECIMEN: NORMAL
LDLC SERPL CALC-MCNC: 82.4 MG/DL (ref 63–159)
MCH RBC QN AUTO: 29.8 PG (ref 27–31)
MCHC RBC AUTO-ENTMCNC: 31.6 G/DL (ref 32–36)
MCV RBC AUTO: 94 FL (ref 82–98)
NONHDLC SERPL-MCNC: 103 MG/DL
OHS QRS DURATION: 74 MS
OHS QTC CALCULATION: 417 MS
PLATELET # BLD AUTO: 252 K/UL (ref 150–450)
PMV BLD AUTO: 10.1 FL (ref 9.2–12.9)
POTASSIUM SERPL-SCNC: 4.1 MMOL/L (ref 3.5–5.1)
PROT SERPL-MCNC: 7.3 GM/DL (ref 6–8.4)
RBC # BLD AUTO: 4.19 M/UL (ref 4–5.4)
SODIUM SERPL-SCNC: 142 MMOL/L (ref 136–145)
TRIGL SERPL-MCNC: 103 MG/DL (ref 30–150)
TSH SERPL-ACNC: 1.68 UIU/ML (ref 0.4–4)
WBC # BLD AUTO: 5.61 K/UL (ref 3.9–12.7)

## 2025-06-02 PROCEDURE — 99213 OFFICE O/P EST LOW 20 MIN: CPT | Mod: PBBFAC | Performed by: FAMILY MEDICINE

## 2025-06-02 PROCEDURE — 99397 PER PM REEVAL EST PAT 65+ YR: CPT | Mod: S$PBB,GZ,, | Performed by: FAMILY MEDICINE

## 2025-06-02 PROCEDURE — 80053 COMPREHEN METABOLIC PANEL: CPT

## 2025-06-02 PROCEDURE — 84443 ASSAY THYROID STIM HORMONE: CPT

## 2025-06-02 PROCEDURE — 85027 COMPLETE CBC AUTOMATED: CPT

## 2025-06-02 PROCEDURE — 83036 HEMOGLOBIN GLYCOSYLATED A1C: CPT

## 2025-06-02 PROCEDURE — 99999 PR PBB SHADOW E&M-EST. PATIENT-LVL III: CPT | Mod: PBBFAC,,, | Performed by: FAMILY MEDICINE

## 2025-06-02 PROCEDURE — 93005 ELECTROCARDIOGRAM TRACING: CPT

## 2025-06-02 PROCEDURE — 93010 ELECTROCARDIOGRAM REPORT: CPT | Mod: ,,, | Performed by: INTERNAL MEDICINE

## 2025-06-02 PROCEDURE — 80061 LIPID PANEL: CPT

## 2025-06-07 ENCOUNTER — PATIENT MESSAGE (OUTPATIENT)
Dept: INTERNAL MEDICINE | Facility: CLINIC | Age: 70
End: 2025-06-07
Payer: MEDICARE

## 2025-06-22 DIAGNOSIS — I10 HTN (HYPERTENSION), BENIGN: ICD-10-CM

## 2025-06-22 RX ORDER — AMILORIDE HYDROCHLORIDE 5 MG/1
10 TABLET ORAL DAILY
Qty: 180 TABLET | Refills: 3 | Status: SHIPPED | OUTPATIENT
Start: 2025-06-22

## 2025-06-22 NOTE — TELEPHONE ENCOUNTER
No care due was identified.  [facility] Embedded Care Due Messages. Reference number: [id number].   6/22/2025 3:37:13 AM CDT

## 2025-06-22 NOTE — TELEPHONE ENCOUNTER
Refill Decision Note   Kacy Blackwell  is requesting a refill authorization.  Brief Assessment and Rationale for Refill:  Approve     Medication Therapy Plan:         Comments:     Note composed:2:20 PM 06/22/2025

## 2025-07-22 DIAGNOSIS — E87.6 HYPOKALEMIA: ICD-10-CM

## 2025-07-22 RX ORDER — POTASSIUM CHLORIDE 20 MEQ/1
20 TABLET, EXTENDED RELEASE ORAL 2 TIMES DAILY
Qty: 180 TABLET | Refills: 3 | Status: SHIPPED | OUTPATIENT
Start: 2025-07-22

## 2025-07-22 RX ORDER — ROSUVASTATIN CALCIUM 5 MG/1
5 TABLET, COATED ORAL NIGHTLY
Qty: 90 TABLET | Refills: 3 | Status: SHIPPED | OUTPATIENT
Start: 2025-07-22

## 2025-07-22 RX ORDER — AMLODIPINE BESYLATE 2.5 MG/1
TABLET ORAL
Qty: 90 TABLET | Refills: 3 | Status: SHIPPED | OUTPATIENT
Start: 2025-07-22

## 2025-07-22 NOTE — TELEPHONE ENCOUNTER
No care due was identified.  [facility] Embedded Care Due Messages. Reference number: [id number].   7/22/2025 3:38:16 AM CDT

## 2025-07-22 NOTE — TELEPHONE ENCOUNTER
Refill Decision Note   Kacy Blackwell  is requesting a refill authorization.  Brief Assessment and Rationale for Refill:  Approve     Medication Therapy Plan:         Comments:     Note composed:12:35 PM 07/22/2025

## 2025-08-03 DIAGNOSIS — I10 HTN (HYPERTENSION), BENIGN: ICD-10-CM

## 2025-08-03 NOTE — TELEPHONE ENCOUNTER
No care due was identified.  [facility] Embedded Care Due Messages. Reference number: [id number].   8/03/2025 3:38:42 AM CDT

## 2025-08-04 RX ORDER — METOPROLOL SUCCINATE 100 MG/1
TABLET, EXTENDED RELEASE ORAL
Qty: 90 TABLET | Refills: 3 | Status: SHIPPED | OUTPATIENT
Start: 2025-08-04

## 2025-08-04 NOTE — TELEPHONE ENCOUNTER
Refill Decision Note   Kacy Blackwell  is requesting a refill authorization.  Brief Assessment and Rationale for Refill:  Approve     Medication Therapy Plan:         Comments:     Note composed:1:11 PM 08/04/2025